# Patient Record
Sex: FEMALE | Race: WHITE | NOT HISPANIC OR LATINO | Employment: OTHER | URBAN - NONMETROPOLITAN AREA
[De-identification: names, ages, dates, MRNs, and addresses within clinical notes are randomized per-mention and may not be internally consistent; named-entity substitution may affect disease eponyms.]

---

## 2017-03-10 LAB
ALBUMIN SERPL-MCNC: 4 G/DL (ref 3.5–5.2)
ALP BLD-CCNC: 82 U/L (ref 35–104)
ALT SERPL-CCNC: 12 U/L (ref 5–33)
ANION GAP SERPL CALCULATED.3IONS-SCNC: 13 MMOL/L (ref 7–19)
AST SERPL-CCNC: 15 U/L (ref 5–32)
BASOPHILS ABSOLUTE: 0.1 K/UL (ref 0–0.2)
BASOPHILS RELATIVE PERCENT: 1.6 % (ref 0–1)
BILIRUB SERPL-MCNC: 0.3 MG/DL (ref 0.2–1.2)
BUN BLDV-MCNC: 16 MG/DL (ref 8–23)
CALCIUM SERPL-MCNC: 8.8 MG/DL (ref 8.8–10.2)
CHLORIDE BLD-SCNC: 106 MMOL/L (ref 98–111)
CO2: 26 MMOL/L (ref 22–29)
CREAT SERPL-MCNC: 0.9 MG/DL (ref 0.5–0.9)
EOSINOPHILS ABSOLUTE: 0.1 K/UL (ref 0–0.6)
EOSINOPHILS RELATIVE PERCENT: 2 % (ref 0–5)
GFR NON-AFRICAN AMERICAN: 59
GLOBULIN: 2.7 G/DL
GLUCOSE BLD-MCNC: 116 MG/DL (ref 74–109)
HCT VFR BLD CALC: 43.5 % (ref 37–47)
HEMOGLOBIN: 14.1 G/DL (ref 12–16)
LYMPHOCYTES ABSOLUTE: 1.6 K/UL (ref 1.1–4.5)
LYMPHOCYTES RELATIVE PERCENT: 23.6 % (ref 20–40)
MAGNESIUM: 2.3 MG/DL (ref 1.6–2.4)
MCH RBC QN AUTO: 31.7 PG (ref 27–31)
MCHC RBC AUTO-ENTMCNC: 32.4 G/DL (ref 33–37)
MCV RBC AUTO: 97.8 FL (ref 81–99)
MONOCYTES ABSOLUTE: 0.6 K/UL (ref 0–0.9)
MONOCYTES RELATIVE PERCENT: 8.8 % (ref 0–10)
NEUTROPHILS ABSOLUTE: 4.4 K/UL (ref 1.5–7.5)
NEUTROPHILS RELATIVE PERCENT: 64 % (ref 50–65)
PARATHYROID HORMONE INTACT: 110.6 PG/ML (ref 15–65)
PDW BLD-RTO: 13.7 % (ref 11.5–14.5)
PHOSPHORUS: 3.9 MG/DL (ref 2.5–4.5)
PLATELET # BLD: 230 K/UL (ref 130–400)
PMV BLD AUTO: 10.3 FL (ref 9.4–12.3)
POTASSIUM SERPL-SCNC: 3.8 MMOL/L (ref 3.5–5)
RBC # BLD: 4.45 M/UL (ref 4.2–5.4)
SODIUM BLD-SCNC: 145 MMOL/L (ref 136–145)
TOTAL PROTEIN: 6.7 G/DL (ref 6.6–8.7)
VITAMIN D 25-HYDROXY: 14.8 NG/ML
WBC # BLD: 6.9 K/UL (ref 4.8–10.8)

## 2017-04-12 ENCOUNTER — INFUSION (OUTPATIENT)
Dept: ONCOLOGY | Facility: HOSPITAL | Age: 82
End: 2017-04-12

## 2017-04-12 VITALS
SYSTOLIC BLOOD PRESSURE: 114 MMHG | TEMPERATURE: 97.6 F | HEART RATE: 93 BPM | OXYGEN SATURATION: 94 % | RESPIRATION RATE: 20 BRPM | DIASTOLIC BLOOD PRESSURE: 76 MMHG

## 2017-04-12 DIAGNOSIS — R69 DIAGNOSIS UNKNOWN: Primary | ICD-10-CM

## 2017-04-12 PROCEDURE — 25010000002 DENOSUMAB 60 MG/ML SOLUTION: Performed by: NURSE PRACTITIONER

## 2017-04-12 PROCEDURE — 96372 THER/PROPH/DIAG INJ SC/IM: CPT

## 2017-04-12 PROCEDURE — 96401 CHEMO ANTI-NEOPL SQ/IM: CPT

## 2017-04-12 RX ADMIN — DENOSUMAB 60 MG: 60 INJECTION SUBCUTANEOUS at 17:03

## 2017-08-12 ENCOUNTER — TELEPHONE (OUTPATIENT)
Dept: INTERNAL MEDICINE | Age: 82
End: 2017-08-12

## 2017-08-12 DIAGNOSIS — R53.83 FATIGUE, UNSPECIFIED TYPE: Primary | ICD-10-CM

## 2017-08-12 DIAGNOSIS — E78.01 FAMILIAL HYPERCHOLESTEROLEMIA: ICD-10-CM

## 2017-09-19 ENCOUNTER — HOSPITAL ENCOUNTER (OUTPATIENT)
Dept: GENERAL RADIOLOGY | Age: 82
Discharge: HOME | End: 2017-09-19
Payer: MEDICARE

## 2017-09-19 ENCOUNTER — OFFICE VISIT (OUTPATIENT)
Dept: INTERNAL MEDICINE | Age: 82
End: 2017-09-19
Payer: MEDICARE

## 2017-09-19 VITALS
SYSTOLIC BLOOD PRESSURE: 112 MMHG | DIASTOLIC BLOOD PRESSURE: 68 MMHG | HEART RATE: 68 BPM | HEIGHT: 66 IN | BODY MASS INDEX: 21.38 KG/M2 | OXYGEN SATURATION: 96 % | WEIGHT: 133 LBS

## 2017-09-19 DIAGNOSIS — R30.0 DYSURIA: ICD-10-CM

## 2017-09-19 DIAGNOSIS — M54.50 LOW BACK PAIN WITHOUT SCIATICA, UNSPECIFIED BACK PAIN LATERALITY, UNSPECIFIED CHRONICITY: ICD-10-CM

## 2017-09-19 DIAGNOSIS — G30.9 ALZHEIMER'S DEMENTIA WITHOUT BEHAVIORAL DISTURBANCE, UNSPECIFIED TIMING OF DEMENTIA ONSET: Primary | ICD-10-CM

## 2017-09-19 DIAGNOSIS — F32.A DEPRESSION, UNSPECIFIED DEPRESSION TYPE: ICD-10-CM

## 2017-09-19 DIAGNOSIS — F02.80 ALZHEIMER'S DEMENTIA WITHOUT BEHAVIORAL DISTURBANCE, UNSPECIFIED TIMING OF DEMENTIA ONSET: Primary | ICD-10-CM

## 2017-09-19 LAB
BACTERIA: ABNORMAL /HPF
BILIRUBIN URINE: ABNORMAL
BLOOD, URINE: ABNORMAL
CLARITY: ABNORMAL
COLOR: YELLOW
CRYSTALS, UA: ABNORMAL /HPF
EPITHELIAL CELLS, UA: ABNORMAL /HPF
GLUCOSE URINE: NEGATIVE MG/DL
KETONES, URINE: NEGATIVE MG/DL
LEUKOCYTE ESTERASE, URINE: ABNORMAL
NITRITE, URINE: NEGATIVE
PH UA: 6
PROTEIN UA: NEGATIVE MG/DL
SPECIFIC GRAVITY UA: 1.02
UROBILINOGEN, URINE: 0.2 E.U./DL
WBC UA: ABNORMAL /HPF (ref 0–5)

## 2017-09-19 PROCEDURE — 1090F PRES/ABSN URINE INCON ASSESS: CPT | Performed by: INTERNAL MEDICINE

## 2017-09-19 PROCEDURE — 99214 OFFICE O/P EST MOD 30 MIN: CPT | Performed by: INTERNAL MEDICINE

## 2017-09-19 PROCEDURE — 1123F ACP DISCUSS/DSCN MKR DOCD: CPT | Performed by: INTERNAL MEDICINE

## 2017-09-19 PROCEDURE — 4040F PNEUMOC VAC/ADMIN/RCVD: CPT | Performed by: INTERNAL MEDICINE

## 2017-09-19 PROCEDURE — G8400 PT W/DXA NO RESULTS DOC: HCPCS | Performed by: INTERNAL MEDICINE

## 2017-09-19 PROCEDURE — 1036F TOBACCO NON-USER: CPT | Performed by: INTERNAL MEDICINE

## 2017-09-19 PROCEDURE — G8420 CALC BMI NORM PARAMETERS: HCPCS | Performed by: INTERNAL MEDICINE

## 2017-09-19 PROCEDURE — G8427 DOCREV CUR MEDS BY ELIG CLIN: HCPCS | Performed by: INTERNAL MEDICINE

## 2017-09-19 PROCEDURE — 72100 X-RAY EXAM L-S SPINE 2/3 VWS: CPT

## 2017-09-19 ASSESSMENT — ENCOUNTER SYMPTOMS
SHORTNESS OF BREATH: 0
EYE DISCHARGE: 0
WHEEZING: 0
ABDOMINAL PAIN: 0
TROUBLE SWALLOWING: 0
BLOOD IN STOOL: 0
VOMITING: 0
NAUSEA: 0
ABDOMINAL DISTENTION: 0
SORE THROAT: 0
BACK PAIN: 1
EYE ITCHING: 0

## 2017-09-19 ASSESSMENT — PATIENT HEALTH QUESTIONNAIRE - PHQ9
SUM OF ALL RESPONSES TO PHQ QUESTIONS 1-9: 0
SUM OF ALL RESPONSES TO PHQ9 QUESTIONS 1 & 2: 0
1. LITTLE INTEREST OR PLEASURE IN DOING THINGS: 0
2. FEELING DOWN, DEPRESSED OR HOPELESS: 0

## 2017-09-20 ENCOUNTER — TELEPHONE (OUTPATIENT)
Dept: INTERNAL MEDICINE | Age: 82
End: 2017-09-20

## 2017-09-21 LAB
ORGANISM: ABNORMAL
URINE CULTURE, ROUTINE: ABNORMAL
URINE CULTURE, ROUTINE: ABNORMAL

## 2017-09-21 RX ORDER — NITROFURANTOIN 25; 75 MG/1; MG/1
100 CAPSULE ORAL 2 TIMES DAILY
Qty: 20 CAPSULE | Refills: 0 | Status: SHIPPED | OUTPATIENT
Start: 2017-09-21 | End: 2017-10-01

## 2017-09-22 ENCOUNTER — TELEPHONE (OUTPATIENT)
Dept: INTERNAL MEDICINE | Age: 82
End: 2017-09-22

## 2017-09-22 DIAGNOSIS — M54.50 LOW BACK PAIN WITHOUT SCIATICA, UNSPECIFIED BACK PAIN LATERALITY, UNSPECIFIED CHRONICITY: Primary | ICD-10-CM

## 2017-09-25 ENCOUNTER — TELEPHONE (OUTPATIENT)
Dept: INTERNAL MEDICINE | Age: 82
End: 2017-09-25

## 2017-09-25 RX ORDER — METHYLPREDNISOLONE 4 MG/1
TABLET ORAL
Qty: 1 KIT | Refills: 0 | Status: SHIPPED | OUTPATIENT
Start: 2017-09-25 | End: 2018-04-02 | Stop reason: ALTCHOICE

## 2017-10-11 DIAGNOSIS — R53.83 FATIGUE, UNSPECIFIED TYPE: ICD-10-CM

## 2017-10-11 DIAGNOSIS — E78.01 FAMILIAL HYPERCHOLESTEROLEMIA: ICD-10-CM

## 2017-10-11 LAB
CALCIUM SERPL-MCNC: 9.3 MG/DL (ref 8.8–10.2)
MAGNESIUM: 2.6 MG/DL (ref 1.6–2.4)
PHOSPHORUS: 3.2 MG/DL (ref 2.5–4.5)
VITAMIN D 25-HYDROXY: >60 NG/ML

## 2017-10-12 LAB
CHOLESTEROL, TOTAL: 247 MG/DL (ref 160–199)
HDLC SERPL-MCNC: 50 MG/DL (ref 65–121)
LDL CHOLESTEROL CALCULATED: 164 MG/DL
TRIGL SERPL-MCNC: 165 MG/DL (ref 0–149)
TSH SERPL DL<=0.05 MIU/L-ACNC: 1.8 UIU/ML (ref 0.27–4.2)

## 2017-10-18 ENCOUNTER — INFUSION (OUTPATIENT)
Dept: ONCOLOGY | Facility: HOSPITAL | Age: 82
End: 2017-10-18

## 2017-10-18 VITALS
SYSTOLIC BLOOD PRESSURE: 140 MMHG | HEIGHT: 66 IN | BODY MASS INDEX: 20.73 KG/M2 | RESPIRATION RATE: 16 BRPM | HEART RATE: 91 BPM | TEMPERATURE: 98.2 F | DIASTOLIC BLOOD PRESSURE: 87 MMHG | WEIGHT: 129 LBS | OXYGEN SATURATION: 95 %

## 2017-10-18 DIAGNOSIS — R69 DIAGNOSIS UNKNOWN: Primary | ICD-10-CM

## 2017-10-18 PROCEDURE — 25010000002 DENOSUMAB 60 MG/ML SOLUTION: Performed by: NURSE PRACTITIONER

## 2017-10-18 PROCEDURE — 96372 THER/PROPH/DIAG INJ SC/IM: CPT

## 2017-10-18 RX ADMIN — DENOSUMAB 60 MG: 60 INJECTION SUBCUTANEOUS at 14:31

## 2018-01-29 ENCOUNTER — TELEPHONE (OUTPATIENT)
Dept: INTERNAL MEDICINE | Age: 83
End: 2018-01-29

## 2018-01-29 DIAGNOSIS — R30.0 DYSURIA: Primary | ICD-10-CM

## 2018-01-29 RX ORDER — NITROFURANTOIN 25; 75 MG/1; MG/1
100 CAPSULE ORAL 2 TIMES DAILY
Qty: 20 CAPSULE | Refills: 0 | Status: SHIPPED | OUTPATIENT
Start: 2018-01-29 | End: 2018-02-08

## 2018-02-19 DIAGNOSIS — R30.0 DYSURIA: Primary | ICD-10-CM

## 2018-02-19 DIAGNOSIS — R30.0 DYSURIA: ICD-10-CM

## 2018-02-21 LAB
ORGANISM: ABNORMAL
URINE CULTURE, ROUTINE: ABNORMAL
URINE CULTURE, ROUTINE: ABNORMAL

## 2018-02-22 RX ORDER — SULFAMETHOXAZOLE AND TRIMETHOPRIM 800; 160 MG/1; MG/1
1 TABLET ORAL 2 TIMES DAILY
Qty: 20 TABLET | Refills: 0 | Status: SHIPPED | OUTPATIENT
Start: 2018-02-22 | End: 2018-03-04

## 2018-03-26 DIAGNOSIS — Z00.00 PE (PHYSICAL EXAM), ROUTINE: Primary | ICD-10-CM

## 2018-03-27 DIAGNOSIS — Z00.00 PE (PHYSICAL EXAM), ROUTINE: ICD-10-CM

## 2018-03-27 LAB
ALBUMIN SERPL-MCNC: 4.2 G/DL (ref 3.5–5.2)
ALP BLD-CCNC: 50 U/L (ref 35–104)
ALT SERPL-CCNC: 9 U/L (ref 5–33)
ANION GAP SERPL CALCULATED.3IONS-SCNC: 11 MMOL/L (ref 7–19)
AST SERPL-CCNC: 15 U/L (ref 5–32)
BACTERIA: NEGATIVE /HPF
BASOPHILS ABSOLUTE: 0.1 K/UL (ref 0–0.2)
BASOPHILS RELATIVE PERCENT: 2 % (ref 0–1)
BILIRUB SERPL-MCNC: 0.4 MG/DL (ref 0.2–1.2)
BILIRUBIN URINE: NEGATIVE
BLOOD, URINE: NEGATIVE
BUN BLDV-MCNC: 11 MG/DL (ref 8–23)
CALCIUM SERPL-MCNC: 9.2 MG/DL (ref 8.8–10.2)
CHLORIDE BLD-SCNC: 102 MMOL/L (ref 98–111)
CHOLESTEROL, TOTAL: 241 MG/DL (ref 160–199)
CLARITY: CLEAR
CO2: 28 MMOL/L (ref 22–29)
COLOR: YELLOW
CREAT SERPL-MCNC: 1 MG/DL (ref 0.5–0.9)
EOSINOPHILS ABSOLUTE: 0.1 K/UL (ref 0–0.6)
EOSINOPHILS RELATIVE PERCENT: 1.7 % (ref 0–5)
EPITHELIAL CELLS, UA: 5 /HPF (ref 0–5)
GFR NON-AFRICAN AMERICAN: 53
GLUCOSE BLD-MCNC: 97 MG/DL (ref 74–109)
GLUCOSE URINE: NEGATIVE MG/DL
HBA1C MFR BLD: 5.6 %
HCT VFR BLD CALC: 44.4 % (ref 37–47)
HDLC SERPL-MCNC: 61 MG/DL (ref 65–121)
HEMOGLOBIN: 14 G/DL (ref 12–16)
HYALINE CASTS: 1 /HPF (ref 0–8)
KETONES, URINE: NEGATIVE MG/DL
LDL CHOLESTEROL CALCULATED: 143 MG/DL
LEUKOCYTE ESTERASE, URINE: ABNORMAL
LYMPHOCYTES ABSOLUTE: 1.8 K/UL (ref 1.1–4.5)
LYMPHOCYTES RELATIVE PERCENT: 26.9 % (ref 20–40)
MCH RBC QN AUTO: 31.5 PG (ref 27–31)
MCHC RBC AUTO-ENTMCNC: 31.5 G/DL (ref 33–37)
MCV RBC AUTO: 100 FL (ref 81–99)
MONOCYTES ABSOLUTE: 0.7 K/UL (ref 0–0.9)
MONOCYTES RELATIVE PERCENT: 10.3 % (ref 0–10)
NEUTROPHILS ABSOLUTE: 3.8 K/UL (ref 1.5–7.5)
NEUTROPHILS RELATIVE PERCENT: 58.6 % (ref 50–65)
NITRITE, URINE: NEGATIVE
PDW BLD-RTO: 13.9 % (ref 11.5–14.5)
PH UA: 7.5
PLATELET # BLD: 244 K/UL (ref 130–400)
PMV BLD AUTO: 10.2 FL (ref 9.4–12.3)
POTASSIUM SERPL-SCNC: 4.5 MMOL/L (ref 3.5–5)
PROTEIN UA: NEGATIVE MG/DL
RBC # BLD: 4.44 M/UL (ref 4.2–5.4)
RBC UA: 1 /HPF (ref 0–4)
SODIUM BLD-SCNC: 141 MMOL/L (ref 136–145)
SPECIFIC GRAVITY UA: 1.01
TOTAL PROTEIN: 7.5 G/DL (ref 6.6–8.7)
TRIGL SERPL-MCNC: 183 MG/DL (ref 0–149)
UROBILINOGEN, URINE: 0.2 E.U./DL
WBC # BLD: 6.5 K/UL (ref 4.8–10.8)
WBC UA: 2 /HPF (ref 0–5)

## 2018-04-02 ENCOUNTER — OFFICE VISIT (OUTPATIENT)
Dept: INTERNAL MEDICINE | Age: 83
End: 2018-04-02
Payer: MEDICARE

## 2018-04-02 VITALS
HEART RATE: 85 BPM | DIASTOLIC BLOOD PRESSURE: 80 MMHG | RESPIRATION RATE: 18 BRPM | SYSTOLIC BLOOD PRESSURE: 130 MMHG | WEIGHT: 126 LBS | OXYGEN SATURATION: 97 % | BODY MASS INDEX: 20.34 KG/M2

## 2018-04-02 DIAGNOSIS — F02.80 ALZHEIMER'S DEMENTIA WITHOUT BEHAVIORAL DISTURBANCE, UNSPECIFIED TIMING OF DEMENTIA ONSET: ICD-10-CM

## 2018-04-02 DIAGNOSIS — G30.9 ALZHEIMER'S DEMENTIA WITHOUT BEHAVIORAL DISTURBANCE, UNSPECIFIED TIMING OF DEMENTIA ONSET: ICD-10-CM

## 2018-04-02 DIAGNOSIS — M54.50 LOW BACK PAIN WITHOUT SCIATICA, UNSPECIFIED BACK PAIN LATERALITY, UNSPECIFIED CHRONICITY: ICD-10-CM

## 2018-04-02 DIAGNOSIS — F32.A DEPRESSION, UNSPECIFIED DEPRESSION TYPE: Primary | ICD-10-CM

## 2018-04-02 PROCEDURE — 4040F PNEUMOC VAC/ADMIN/RCVD: CPT | Performed by: INTERNAL MEDICINE

## 2018-04-02 PROCEDURE — 1123F ACP DISCUSS/DSCN MKR DOCD: CPT | Performed by: INTERNAL MEDICINE

## 2018-04-02 PROCEDURE — 1036F TOBACCO NON-USER: CPT | Performed by: INTERNAL MEDICINE

## 2018-04-02 PROCEDURE — 1090F PRES/ABSN URINE INCON ASSESS: CPT | Performed by: INTERNAL MEDICINE

## 2018-04-02 PROCEDURE — G8400 PT W/DXA NO RESULTS DOC: HCPCS | Performed by: INTERNAL MEDICINE

## 2018-04-02 PROCEDURE — G8427 DOCREV CUR MEDS BY ELIG CLIN: HCPCS | Performed by: INTERNAL MEDICINE

## 2018-04-02 PROCEDURE — G8420 CALC BMI NORM PARAMETERS: HCPCS | Performed by: INTERNAL MEDICINE

## 2018-04-02 PROCEDURE — 99214 OFFICE O/P EST MOD 30 MIN: CPT | Performed by: INTERNAL MEDICINE

## 2018-04-16 LAB
CALCIUM SERPL-MCNC: 9.4 MG/DL (ref 8.8–10.2)
VITAMIN D 25-HYDROXY: >60 NG/ML

## 2018-04-25 ENCOUNTER — INFUSION (OUTPATIENT)
Dept: ONCOLOGY | Facility: HOSPITAL | Age: 83
End: 2018-04-25

## 2018-04-25 VITALS
WEIGHT: 129 LBS | HEIGHT: 66 IN | HEART RATE: 88 BPM | TEMPERATURE: 98.1 F | BODY MASS INDEX: 20.73 KG/M2 | RESPIRATION RATE: 20 BRPM | OXYGEN SATURATION: 93 % | DIASTOLIC BLOOD PRESSURE: 78 MMHG | SYSTOLIC BLOOD PRESSURE: 145 MMHG

## 2018-04-25 DIAGNOSIS — M81.0 SENILE OSTEOPOROSIS: Primary | ICD-10-CM

## 2018-04-25 PROCEDURE — 25010000002 DENOSUMAB 60 MG/ML SOLUTION: Performed by: NURSE PRACTITIONER

## 2018-04-25 PROCEDURE — 96372 THER/PROPH/DIAG INJ SC/IM: CPT

## 2018-04-25 RX ADMIN — DENOSUMAB 60 MG: 60 INJECTION SUBCUTANEOUS at 15:43

## 2018-04-25 NOTE — PATIENT INSTRUCTIONS
Denosumab injection  What is this medicine?  DENOSUMAB (den oh césar mab) slows bone breakdown. Prolia is used to treat osteoporosis in women after menopause and in men. Xgeva is used to treat a high calcium level due to cancer and to prevent bone fractures and other bone problems caused by multiple myeloma or cancer bone metastases. Xgeva is also used to treat giant cell tumor of the bone.  This medicine may be used for other purposes; ask your health care provider or pharmacist if you have questions.  COMMON BRAND NAME(S): Prolia, XGEVA  What should I tell my health care provider before I take this medicine?  They need to know if you have any of these conditions:  -dental disease  -having surgery or tooth extraction  -infection  -kidney disease  -low levels of calcium or Vitamin D in the blood  -malnutrition  -on hemodialysis  -skin conditions or sensitivity  -thyroid or parathyroid disease  -an unusual reaction to denosumab, other medicines, foods, dyes, or preservatives  -pregnant or trying to get pregnant  -breast-feeding  How should I use this medicine?  This medicine is for injection under the skin. It is given by a health care professional in a hospital or clinic setting.  If you are getting Prolia, a special MedGuide will be given to you by the pharmacist with each prescription and refill. Be sure to read this information carefully each time.  For Prolia, talk to your pediatrician regarding the use of this medicine in children. Special care may be needed. For Xgeva, talk to your pediatrician regarding the use of this medicine in children. While this drug may be prescribed for children as young as 13 years for selected conditions, precautions do apply.  Overdosage: If you think you have taken too much of this medicine contact a poison control center or emergency room at once.  NOTE: This medicine is only for you. Do not share this medicine with others.  What if I miss a dose?  It is important not to miss your  dose. Call your doctor or health care professional if you are unable to keep an appointment.  What may interact with this medicine?  Do not take this medicine with any of the following medications:  -other medicines containing denosumab  This medicine may also interact with the following medications:  -medicines that lower your chance of fighting infection  -steroid medicines like prednisone or cortisone  This list may not describe all possible interactions. Give your health care provider a list of all the medicines, herbs, non-prescription drugs, or dietary supplements you use. Also tell them if you smoke, drink alcohol, or use illegal drugs. Some items may interact with your medicine.  What should I watch for while using this medicine?  Visit your doctor or health care professional for regular checks on your progress. Your doctor or health care professional may order blood tests and other tests to see how you are doing.  Call your doctor or health care professional for advice if you get a fever, chills or sore throat, or other symptoms of a cold or flu. Do not treat yourself. This drug may decrease your body's ability to fight infection. Try to avoid being around people who are sick.  You should make sure you get enough calcium and vitamin D while you are taking this medicine, unless your doctor tells you not to. Discuss the foods you eat and the vitamins you take with your health care professional.  See your dentist regularly. Brush and floss your teeth as directed. Before you have any dental work done, tell your dentist you are receiving this medicine.  Do not become pregnant while taking this medicine or for 5 months after stopping it. Talk with your doctor or health care professional about your birth control options while taking this medicine. Women should inform their doctor if they wish to become pregnant or think they might be pregnant. There is a potential for serious side effects to an unborn child. Talk  to your health care professional or pharmacist for more information.  What side effects may I notice from receiving this medicine?  Side effects that you should report to your doctor or health care professional as soon as possible:  -allergic reactions like skin rash, itching or hives, swelling of the face, lips, or tongue  -bone pain  -breathing problems  -dizziness  -jaw pain, especially after dental work  -redness, blistering, peeling of the skin  -signs and symptoms of infection like fever or chills; cough; sore throat; pain or trouble passing urine  -signs of low calcium like fast heartbeat, muscle cramps or muscle pain; pain, tingling, numbness in the hands or feet; seizures  -unusual bleeding or bruising  -unusually weak or tired  Side effects that usually do not require medical attention (report to your doctor or health care professional if they continue or are bothersome):  -constipation  -diarrhea  -headache  -joint pain  -loss of appetite  -muscle pain  -runny nose  -tiredness  -upset stomach  This list may not describe all possible side effects. Call your doctor for medical advice about side effects. You may report side effects to FDA at 4-691-FDA-2548.  Where should I keep my medicine?  This medicine is only given in a clinic, doctor's office, or other health care setting and will not be stored at home.  NOTE: This sheet is a summary. It may not cover all possible information. If you have questions about this medicine, talk to your doctor, pharmacist, or health care provider.  © 2018 Elsevier/Gold Standard (2018-01-09 19:17:21)

## 2018-10-18 ENCOUNTER — OFFICE VISIT (OUTPATIENT)
Dept: INTERNAL MEDICINE | Age: 83
End: 2018-10-18
Payer: MEDICARE

## 2018-10-18 VITALS
HEIGHT: 66 IN | HEART RATE: 90 BPM | BODY MASS INDEX: 20.09 KG/M2 | SYSTOLIC BLOOD PRESSURE: 122 MMHG | DIASTOLIC BLOOD PRESSURE: 82 MMHG | OXYGEN SATURATION: 98 % | WEIGHT: 125 LBS

## 2018-10-18 DIAGNOSIS — M54.50 LOW BACK PAIN WITHOUT SCIATICA, UNSPECIFIED BACK PAIN LATERALITY, UNSPECIFIED CHRONICITY: ICD-10-CM

## 2018-10-18 DIAGNOSIS — E78.00 HYPERCHOLESTEREMIA: ICD-10-CM

## 2018-10-18 DIAGNOSIS — F32.A DEPRESSION, UNSPECIFIED DEPRESSION TYPE: Primary | ICD-10-CM

## 2018-10-18 DIAGNOSIS — N18.30 CHRONIC KIDNEY DISEASE, STAGE III (MODERATE) (HCC): ICD-10-CM

## 2018-10-18 DIAGNOSIS — F03.90 DEMENTIA WITHOUT BEHAVIORAL DISTURBANCE, UNSPECIFIED DEMENTIA TYPE: ICD-10-CM

## 2018-10-18 LAB
CALCIUM SERPL-MCNC: 9.8 MG/DL (ref 8.8–10.2)
MAGNESIUM: 2.5 MG/DL (ref 1.6–2.4)
PHOSPHORUS: 3.7 MG/DL (ref 2.5–4.5)
VITAMIN D 25-HYDROXY: >99 NG/ML

## 2018-10-18 PROCEDURE — G8484 FLU IMMUNIZE NO ADMIN: HCPCS | Performed by: INTERNAL MEDICINE

## 2018-10-18 PROCEDURE — 99214 OFFICE O/P EST MOD 30 MIN: CPT | Performed by: INTERNAL MEDICINE

## 2018-10-18 PROCEDURE — 1101F PT FALLS ASSESS-DOCD LE1/YR: CPT | Performed by: INTERNAL MEDICINE

## 2018-10-18 PROCEDURE — 1036F TOBACCO NON-USER: CPT | Performed by: INTERNAL MEDICINE

## 2018-10-18 PROCEDURE — 4040F PNEUMOC VAC/ADMIN/RCVD: CPT | Performed by: INTERNAL MEDICINE

## 2018-10-18 PROCEDURE — 1090F PRES/ABSN URINE INCON ASSESS: CPT | Performed by: INTERNAL MEDICINE

## 2018-10-18 PROCEDURE — G8427 DOCREV CUR MEDS BY ELIG CLIN: HCPCS | Performed by: INTERNAL MEDICINE

## 2018-10-18 PROCEDURE — G8420 CALC BMI NORM PARAMETERS: HCPCS | Performed by: INTERNAL MEDICINE

## 2018-10-18 PROCEDURE — 1123F ACP DISCUSS/DSCN MKR DOCD: CPT | Performed by: INTERNAL MEDICINE

## 2018-10-18 ASSESSMENT — ENCOUNTER SYMPTOMS
SINUS PRESSURE: 0
ABDOMINAL DISTENTION: 0
TROUBLE SWALLOWING: 0
EYE ITCHING: 0
ABDOMINAL PAIN: 0
BACK PAIN: 0
WHEEZING: 0
SHORTNESS OF BREATH: 0
EYE DISCHARGE: 0
VOMITING: 0
BLOOD IN STOOL: 0
NAUSEA: 0
SORE THROAT: 0

## 2018-10-18 ASSESSMENT — PATIENT HEALTH QUESTIONNAIRE - PHQ9
1. LITTLE INTEREST OR PLEASURE IN DOING THINGS: 0
2. FEELING DOWN, DEPRESSED OR HOPELESS: 0
SUM OF ALL RESPONSES TO PHQ QUESTIONS 1-9: 0
SUM OF ALL RESPONSES TO PHQ QUESTIONS 1-9: 0
SUM OF ALL RESPONSES TO PHQ9 QUESTIONS 1 & 2: 0

## 2018-10-18 NOTE — PROGRESS NOTES
Genitourinary: Negative for flank pain, frequency, hematuria and urgency. Musculoskeletal: Negative for arthralgias, back pain and joint swelling. Skin: Negative for rash and wound. Allergic/Immunologic: Negative for environmental allergies and food allergies. Neurological: Negative for dizziness, tremors, syncope, weakness, numbness and headaches. Hematological: Negative for adenopathy. Psychiatric/Behavioral: Positive for behavioral problems and sleep disturbance. Negative for agitation and hallucinations. The patient is nervous/anxious. Objective:     /82   Pulse 90   Ht 5' 6\" (1.676 m)   Wt 125 lb (56.7 kg)   SpO2 98%   BMI 20.18 kg/m²   Physical Exam   Constitutional: She is oriented to person, place, and time. She appears well-developed and well-nourished. No distress. HENT:   Head: Normocephalic and atraumatic. Right Ear: External ear normal.   Left Ear: External ear normal.   Nose: Nose normal.   Mouth/Throat: Oropharynx is clear and moist. No oropharyngeal exudate. Eyes: Pupils are equal, round, and reactive to light. Conjunctivae and EOM are normal. Right eye exhibits no discharge. Left eye exhibits no discharge. No scleral icterus. Neck: Normal range of motion. Neck supple. No JVD present. No tracheal deviation present. No thyromegaly present. Cardiovascular: Normal rate, regular rhythm, normal heart sounds and intact distal pulses. Exam reveals no gallop and no friction rub. No murmur heard. Pulmonary/Chest: Effort normal and breath sounds normal. No respiratory distress. She has no wheezes. She has no rales. Abdominal: Soft. Bowel sounds are normal. She exhibits no distension and no mass. There is no tenderness. There is no rebound and no guarding. Musculoskeletal: Normal range of motion. She exhibits no edema, tenderness or deformity. Lymphadenopathy:     She has no cervical adenopathy.    Neurological: She is alert and oriented to person, place, and

## 2018-10-22 DIAGNOSIS — M81.0 OSTEOPOROSIS, UNSPECIFIED OSTEOPOROSIS TYPE, UNSPECIFIED PATHOLOGICAL FRACTURE PRESENCE: ICD-10-CM

## 2018-10-31 ENCOUNTER — HOSPITAL ENCOUNTER (OUTPATIENT)
Dept: INFUSION THERAPY | Age: 83
Setting detail: INFUSION SERIES
Discharge: HOME | End: 2018-10-31
Payer: MEDICARE

## 2018-10-31 VITALS
TEMPERATURE: 98.4 F | RESPIRATION RATE: 17 BRPM | SYSTOLIC BLOOD PRESSURE: 150 MMHG | DIASTOLIC BLOOD PRESSURE: 85 MMHG | HEART RATE: 86 BPM

## 2018-10-31 DIAGNOSIS — M81.0 OSTEOPOROSIS, UNSPECIFIED OSTEOPOROSIS TYPE, UNSPECIFIED PATHOLOGICAL FRACTURE PRESENCE: ICD-10-CM

## 2018-10-31 PROCEDURE — 6360000002 HC RX W HCPCS: Performed by: NURSE PRACTITIONER

## 2018-10-31 PROCEDURE — 96372 THER/PROPH/DIAG INJ SC/IM: CPT

## 2018-10-31 RX ADMIN — DENOSUMAB 60 MG: 60 INJECTION SUBCUTANEOUS at 15:06

## 2019-01-04 ENCOUNTER — OFFICE VISIT (OUTPATIENT)
Dept: INTERNAL MEDICINE | Age: 84
End: 2019-01-04
Payer: MEDICARE

## 2019-01-04 VITALS
DIASTOLIC BLOOD PRESSURE: 78 MMHG | TEMPERATURE: 97.9 F | HEIGHT: 66 IN | HEART RATE: 77 BPM | SYSTOLIC BLOOD PRESSURE: 130 MMHG | OXYGEN SATURATION: 96 % | WEIGHT: 125 LBS | BODY MASS INDEX: 20.09 KG/M2

## 2019-01-04 DIAGNOSIS — R30.0 DYSURIA: Primary | ICD-10-CM

## 2019-01-04 LAB
APPEARANCE FLUID: ABNORMAL
BILIRUBIN, POC: ABNORMAL
BLOOD URINE, POC: ABNORMAL
CLARITY, POC: ABNORMAL
COLOR, POC: YELLOW
GLUCOSE URINE, POC: ABNORMAL
KETONES, POC: ABNORMAL
LEUKOCYTE EST, POC: ABNORMAL
NITRITE, POC: POSITIVE
PH, POC: 6
PROTEIN, POC: ABNORMAL
SPECIFIC GRAVITY, POC: 1.02
UROBILINOGEN, POC: 0.2

## 2019-01-04 PROCEDURE — 1101F PT FALLS ASSESS-DOCD LE1/YR: CPT | Performed by: INTERNAL MEDICINE

## 2019-01-04 PROCEDURE — 1036F TOBACCO NON-USER: CPT | Performed by: INTERNAL MEDICINE

## 2019-01-04 PROCEDURE — 99213 OFFICE O/P EST LOW 20 MIN: CPT | Performed by: INTERNAL MEDICINE

## 2019-01-04 PROCEDURE — 1123F ACP DISCUSS/DSCN MKR DOCD: CPT | Performed by: INTERNAL MEDICINE

## 2019-01-04 PROCEDURE — 81002 URINALYSIS NONAUTO W/O SCOPE: CPT | Performed by: INTERNAL MEDICINE

## 2019-01-04 PROCEDURE — G8427 DOCREV CUR MEDS BY ELIG CLIN: HCPCS | Performed by: INTERNAL MEDICINE

## 2019-01-04 PROCEDURE — 1090F PRES/ABSN URINE INCON ASSESS: CPT | Performed by: INTERNAL MEDICINE

## 2019-01-04 PROCEDURE — G8484 FLU IMMUNIZE NO ADMIN: HCPCS | Performed by: INTERNAL MEDICINE

## 2019-01-04 PROCEDURE — G8420 CALC BMI NORM PARAMETERS: HCPCS | Performed by: INTERNAL MEDICINE

## 2019-01-04 PROCEDURE — 4040F PNEUMOC VAC/ADMIN/RCVD: CPT | Performed by: INTERNAL MEDICINE

## 2019-01-04 RX ORDER — PHENAZOPYRIDINE HYDROCHLORIDE 200 MG/1
200 TABLET, FILM COATED ORAL 3 TIMES DAILY PRN
Qty: 30 TABLET | Refills: 0 | Status: SHIPPED | OUTPATIENT
Start: 2019-01-04 | End: 2019-01-07

## 2019-01-04 RX ORDER — NITROFURANTOIN 25; 75 MG/1; MG/1
100 CAPSULE ORAL 2 TIMES DAILY
Qty: 20 CAPSULE | Refills: 0 | Status: SHIPPED | OUTPATIENT
Start: 2019-01-04 | End: 2019-01-18 | Stop reason: SDUPTHER

## 2019-01-16 ENCOUNTER — TELEPHONE (OUTPATIENT)
Dept: INTERNAL MEDICINE | Age: 84
End: 2019-01-16

## 2019-01-17 DIAGNOSIS — R30.0 DYSURIA: Primary | ICD-10-CM

## 2019-01-18 ENCOUNTER — TELEPHONE (OUTPATIENT)
Dept: INTERNAL MEDICINE | Age: 84
End: 2019-01-18

## 2019-01-18 DIAGNOSIS — R30.0 DYSURIA: ICD-10-CM

## 2019-01-18 DIAGNOSIS — R30.0 DYSURIA: Primary | ICD-10-CM

## 2019-01-18 LAB
BACTERIA: ABNORMAL /HPF
BILIRUBIN URINE: NEGATIVE
BLOOD, URINE: NEGATIVE
CLARITY: ABNORMAL
COLOR: YELLOW
EPITHELIAL CELLS, UA: 1 /HPF (ref 0–5)
GLUCOSE URINE: NEGATIVE MG/DL
HYALINE CASTS: 3 /HPF (ref 0–8)
KETONES, URINE: NEGATIVE MG/DL
LEUKOCYTE ESTERASE, URINE: ABNORMAL
NITRITE, URINE: NEGATIVE
PH UA: 6
PROTEIN UA: NEGATIVE MG/DL
RBC UA: 1 /HPF (ref 0–4)
SPECIFIC GRAVITY UA: 1.01
URINE REFLEX TO CULTURE: YES
UROBILINOGEN, URINE: 0.2 E.U./DL
WBC UA: 90 /HPF (ref 0–5)

## 2019-01-18 RX ORDER — NITROFURANTOIN 25; 75 MG/1; MG/1
100 CAPSULE ORAL 2 TIMES DAILY
Qty: 20 CAPSULE | Refills: 0 | Status: SHIPPED | OUTPATIENT
Start: 2019-01-18 | End: 2019-01-28

## 2019-01-20 LAB
ORGANISM: ABNORMAL
URINE CULTURE, ROUTINE: ABNORMAL
URINE CULTURE, ROUTINE: ABNORMAL

## 2019-01-29 ENCOUNTER — TELEPHONE (OUTPATIENT)
Dept: INTERNAL MEDICINE | Age: 84
End: 2019-01-29

## 2019-01-29 DIAGNOSIS — R30.0 DYSURIA: Primary | ICD-10-CM

## 2019-01-31 DIAGNOSIS — R30.0 DYSURIA: ICD-10-CM

## 2019-01-31 LAB
BACTERIA: ABNORMAL /HPF
BILIRUBIN URINE: NEGATIVE
BLOOD, URINE: NEGATIVE
CLARITY: ABNORMAL
COLOR: YELLOW
CRYSTALS, UA: ABNORMAL /HPF
EPITHELIAL CELLS, UA: ABNORMAL /HPF
GLUCOSE URINE: NEGATIVE MG/DL
KETONES, URINE: NEGATIVE MG/DL
LEUKOCYTE ESTERASE, URINE: ABNORMAL
NITRITE, URINE: NEGATIVE
PH UA: 6
PROTEIN UA: NEGATIVE MG/DL
RBC UA: ABNORMAL /HPF (ref 0–2)
SPECIFIC GRAVITY UA: 1.02
UROBILINOGEN, URINE: 0.2 E.U./DL
WBC UA: ABNORMAL /HPF (ref 0–5)

## 2019-01-31 RX ORDER — NITROFURANTOIN 25; 75 MG/1; MG/1
CAPSULE ORAL
Qty: 90 CAPSULE | Refills: 0 | Status: SHIPPED | OUTPATIENT
Start: 2019-01-31 | End: 2019-05-13

## 2019-04-24 DIAGNOSIS — F32.A DEPRESSION, UNSPECIFIED DEPRESSION TYPE: ICD-10-CM

## 2019-04-24 DIAGNOSIS — E78.00 HYPERCHOLESTEREMIA: ICD-10-CM

## 2019-04-24 DIAGNOSIS — M54.50 LOW BACK PAIN WITHOUT SCIATICA, UNSPECIFIED BACK PAIN LATERALITY, UNSPECIFIED CHRONICITY: ICD-10-CM

## 2019-04-24 DIAGNOSIS — F03.90 DEMENTIA WITHOUT BEHAVIORAL DISTURBANCE, UNSPECIFIED DEMENTIA TYPE: ICD-10-CM

## 2019-04-24 LAB
ALBUMIN SERPL-MCNC: 4 G/DL (ref 3.5–5.2)
ALP BLD-CCNC: 47 U/L (ref 35–104)
ALT SERPL-CCNC: 9 U/L (ref 5–33)
ANION GAP SERPL CALCULATED.3IONS-SCNC: 12 MMOL/L (ref 7–19)
AST SERPL-CCNC: 14 U/L (ref 5–32)
BASOPHILS ABSOLUTE: 0.1 K/UL (ref 0–0.2)
BASOPHILS RELATIVE PERCENT: 1.3 % (ref 0–1)
BILIRUB SERPL-MCNC: 0.3 MG/DL (ref 0.2–1.2)
BUN BLDV-MCNC: 17 MG/DL (ref 8–23)
CALCIUM SERPL-MCNC: 9.5 MG/DL (ref 8.8–10.2)
CHLORIDE BLD-SCNC: 105 MMOL/L (ref 98–111)
CHOLESTEROL, TOTAL: 248 MG/DL (ref 160–199)
CO2: 26 MMOL/L (ref 22–29)
CREAT SERPL-MCNC: 0.7 MG/DL (ref 0.5–0.9)
EOSINOPHILS ABSOLUTE: 0.1 K/UL (ref 0–0.6)
EOSINOPHILS RELATIVE PERCENT: 1.5 % (ref 0–5)
GFR NON-AFRICAN AMERICAN: >60
GLUCOSE BLD-MCNC: 139 MG/DL (ref 74–109)
HCT VFR BLD CALC: 43 % (ref 37–47)
HDLC SERPL-MCNC: 56 MG/DL (ref 65–121)
HEMOGLOBIN: 13.5 G/DL (ref 12–16)
LDL CHOLESTEROL CALCULATED: 152 MG/DL
LYMPHOCYTES ABSOLUTE: 1.3 K/UL (ref 1.1–4.5)
LYMPHOCYTES RELATIVE PERCENT: 18.6 % (ref 20–40)
MCH RBC QN AUTO: 32.4 PG (ref 27–31)
MCHC RBC AUTO-ENTMCNC: 31.4 G/DL (ref 33–37)
MCV RBC AUTO: 103.1 FL (ref 81–99)
MONOCYTES ABSOLUTE: 0.5 K/UL (ref 0–0.9)
MONOCYTES RELATIVE PERCENT: 7.7 % (ref 0–10)
NEUTROPHILS ABSOLUTE: 4.7 K/UL (ref 1.5–7.5)
NEUTROPHILS RELATIVE PERCENT: 70.5 % (ref 50–65)
PDW BLD-RTO: 13.5 % (ref 11.5–14.5)
PLATELET # BLD: 114 K/UL (ref 130–400)
PMV BLD AUTO: 11.6 FL (ref 9.4–12.3)
POTASSIUM SERPL-SCNC: 4.1 MMOL/L (ref 3.5–5)
RBC # BLD: 4.17 M/UL (ref 4.2–5.4)
SODIUM BLD-SCNC: 143 MMOL/L (ref 136–145)
TOTAL PROTEIN: 7.2 G/DL (ref 6.6–8.7)
TRIGL SERPL-MCNC: 201 MG/DL (ref 0–149)
TSH SERPL DL<=0.05 MIU/L-ACNC: 1.95 UIU/ML (ref 0.27–4.2)
VITAMIN D 25-HYDROXY: 97.5 NG/ML
WBC # BLD: 6.7 K/UL (ref 4.8–10.8)

## 2019-05-08 ENCOUNTER — TELEPHONE (OUTPATIENT)
Dept: INTERNAL MEDICINE | Age: 84
End: 2019-05-08

## 2019-05-09 DIAGNOSIS — R30.0 DYSURIA: ICD-10-CM

## 2019-05-09 DIAGNOSIS — R30.0 DYSURIA: Primary | ICD-10-CM

## 2019-05-09 LAB
BACTERIA: ABNORMAL /HPF
BILIRUBIN URINE: NEGATIVE
BLOOD, URINE: NEGATIVE
CLARITY: ABNORMAL
COLOR: YELLOW
EPITHELIAL CELLS, UA: 3 /HPF (ref 0–5)
GLUCOSE URINE: NEGATIVE MG/DL
HYALINE CASTS: 9 /HPF (ref 0–8)
KETONES, URINE: NEGATIVE MG/DL
LEUKOCYTE ESTERASE, URINE: ABNORMAL
NITRITE, URINE: POSITIVE
PH UA: 6 (ref 5–8)
PROTEIN UA: NEGATIVE MG/DL
RBC UA: 1 /HPF (ref 0–4)
SPECIFIC GRAVITY UA: 1.02 (ref 1–1.03)
URINE REFLEX TO CULTURE: YES
UROBILINOGEN, URINE: 0.2 E.U./DL
WBC UA: 29 /HPF (ref 0–5)

## 2019-05-11 LAB
ORGANISM: ABNORMAL
URINE CULTURE, ROUTINE: ABNORMAL
URINE CULTURE, ROUTINE: ABNORMAL

## 2019-05-13 RX ORDER — SULFAMETHOXAZOLE AND TRIMETHOPRIM 800; 160 MG/1; MG/1
1 TABLET ORAL 2 TIMES DAILY
Qty: 28 TABLET | Refills: 0 | Status: SHIPPED | OUTPATIENT
Start: 2019-05-13 | End: 2019-09-03 | Stop reason: SDUPTHER

## 2019-05-13 NOTE — TELEPHONE ENCOUNTER
Spoke with daughter, they would like to know the results of pt's UA and if she can get a prolia shot.

## 2019-05-13 NOTE — TELEPHONE ENCOUNTER
She still has an infection.  Switch her to Bactrim DS 1 by mouth twice a day for 2 weeks send that to the pharmacy

## 2019-05-15 ENCOUNTER — HOSPITAL ENCOUNTER (OUTPATIENT)
Dept: INFUSION THERAPY | Age: 84
Setting detail: INFUSION SERIES
Discharge: HOME | End: 2019-05-15
Payer: MEDICARE

## 2019-05-15 VITALS — SYSTOLIC BLOOD PRESSURE: 120 MMHG | HEART RATE: 81 BPM | DIASTOLIC BLOOD PRESSURE: 67 MMHG | RESPIRATION RATE: 17 BRPM

## 2019-05-15 DIAGNOSIS — M81.0 OSTEOPOROSIS, UNSPECIFIED OSTEOPOROSIS TYPE, UNSPECIFIED PATHOLOGICAL FRACTURE PRESENCE: Primary | ICD-10-CM

## 2019-05-15 PROCEDURE — 6360000002 HC RX W HCPCS: Performed by: NURSE PRACTITIONER

## 2019-05-15 PROCEDURE — 96372 THER/PROPH/DIAG INJ SC/IM: CPT

## 2019-05-15 PROCEDURE — 96413 CHEMO IV INFUSION 1 HR: CPT

## 2019-05-15 RX ADMIN — DENOSUMAB 60 MG: 60 INJECTION SUBCUTANEOUS at 14:46

## 2019-05-15 NOTE — DISCHARGE INSTR - COC
Continuity of Care Form    Patient Name: [place]   :  1932  MRN:  [id number]    Admit date:  5/15/2019  Discharge date:  ***    Code Status Order: No Order   Advance Directives:     Admitting Physician:  No admitting provider for patient encounter. PCP: Lili Spann MD    Discharging Nurse: [facility] Unit/Room#: No information available for this encounter. Discharging Unit Phone Number: ***    Emergency Contact:   Extended Emergency Contact Information  Primary Emergency Contact: KevinJanelle  Address: [facility] [address] [facility], [address] Phone: [phone]  Mobile Phone: [phone]  Relation: Child  Secondary Emergency Contact: Karen Nolasco And Catracho muller  Address: [facility] [address] Phone: [phone]  Mobile Phone: [phone]  Relation: Child    Past Surgical History:  Past Surgical History:   Procedure Laterality Date    EYE SURGERY      HIP SURGERY         Immunization History: There is no immunization history on file for this patient.     Active Problems:  Patient Active Problem List   Diagnosis Code    Low back pain M54.5    Depression F32.9    Dysuria R30.0    Osteoporosis M81.0       Isolation/Infection:   Isolation          No Isolation            Nurse Assessment:  Last Vital Signs: /67   Pulse 81   Resp 17     Last documented pain score (0-10 scale):    Last Weight:   Wt Readings from Last 1 Encounters:   19 125 lb (56.7 kg)     Mental Status:  {IP PT MENTAL STATUS:86394}    IV Access:  { MONY IV ACCESS:348340864}    Nursing Mobility/ADLs:  Walking   {CHP DME JPOJ:373469426}  Transfer  {CHP DME TWZK:805315755}  Bathing  {CHP DME IQJD:849438273}  Dressing  {CHP DME IOCT:099745370}  Toileting  {CHP DME AQUY:866790190}  Feeding  {CHP DME GAIK:148617653}  Med Admin  {CHP DME YPKH:100725504}  Med Delivery   { MONY MED Delivery:974539915}    Wound Care Documentation and Therapy:        Elimination:  Continence:   · Bowel: {YES / RONALDO:57972}  · Bladder: {YES / IL:34702}  Urinary Catheter: {Urinary Catheter:206718840}   Colostomy/Ileostomy/Ileal Conduit: {YES / DI:10284}       Date of Last BM: ***  No intake or output data in the 24 hours ending 05/15/19 1437  No intake/output data recorded.     Safety Concerns:     [facility] Safety Concerns:197962617}    Impairments/Disabilities:      [facility] Impairments/Disabilities:987688455}    Nutrition Therapy:  Current Nutrition Therapy:   [facility] Diet List:582793962}    Routes of Feeding: {CHP DME Other Feedings:716894160}  Liquids: {Slp liquid thickness:39844}  Daily Fluid Restriction: {CHP DME Yes amt example:990913765}  Last Modified Barium Swallow with Video (Video Swallowing Test): {Done Not Done DQQL:760602743}    Treatments at the Time of Hospital Discharge:   Respiratory Treatments: ***  Oxygen Therapy:  {Therapy; copd oxygen:19156}  Ventilator:    { CC Vent XUUW:639997358}    Rehab Therapies: {THERAPEUTIC INTERVENTION:7206308969}  Weight Bearing Status/Restrictions: [facility] Weight Bearin}  Other Medical Equipment (for information only, NOT a DME order):  {EQUIPMENT:851109191}  Other Treatments: ***    Patient's personal belongings (please select all that are sent with patient):  {[facility] DME Belongings:658520310}    RN SIGNATURE:  {Esignature:551518659}    CASE MANAGEMENT/SOCIAL WORK SECTION    Inpatient Status Date: ***    Readmission Risk Assessment Score:  Readmission Risk              Risk of Unplanned Readmission:        0           Discharging to Facility/ Agency   · Name:   · Address:  · Phone:  · Fax:    Dialysis Facility (if applicable)   · Name:  · Address:  · Dialysis Schedule:  · Phone:  · Fax:    / signature: {Esignature:834726508}    PHYSICIAN SECTION    Prognosis: {Prognosis:4702506217}    Condition at Discharge: [facility] Patient Condition:794995822}    Rehab Potential (if transferring to Rehab): {Prognosis:8304106881}    Recommended Labs or Other Treatments After Discharge: ***    Physician Certification: I certify the above information and transfer of Kishan Schroeder  is necessary for the continuing treatment of the diagnosis listed and that she requires {Admit to Appropriate Level of Care:79823} for {GREATER/LESS:795354003} 30 days.      Update Admission H&P: {CHP DME Changes in WHQEW:045391104}    PHYSICIAN SIGNATURE:  {Esignature:908290163}

## 2019-05-28 ENCOUNTER — TELEPHONE (OUTPATIENT)
Dept: INTERNAL MEDICINE | Age: 84
End: 2019-05-28

## 2019-05-28 DIAGNOSIS — R30.0 DYSURIA: Primary | ICD-10-CM

## 2019-05-28 NOTE — TELEPHONE ENCOUNTER
Daughter called wants c&s due to pt has been on antibiotic long term and if still infection wants another plan of care.

## 2019-05-29 DIAGNOSIS — R30.0 DYSURIA: ICD-10-CM

## 2019-05-29 DIAGNOSIS — R30.0 DYSURIA: Primary | ICD-10-CM

## 2019-05-31 LAB — URINE CULTURE, ROUTINE: NORMAL

## 2019-07-02 ENCOUNTER — TELEPHONE (OUTPATIENT)
Dept: INTERNAL MEDICINE | Age: 84
End: 2019-07-02

## 2019-07-02 DIAGNOSIS — R30.0 DYSURIA: Primary | ICD-10-CM

## 2019-07-07 LAB — URINE CULTURE, ROUTINE: NORMAL

## 2019-07-09 ENCOUNTER — OFFICE VISIT (OUTPATIENT)
Dept: INTERNAL MEDICINE | Age: 84
End: 2019-07-09
Payer: MEDICARE

## 2019-07-09 VITALS
OXYGEN SATURATION: 96 % | BODY MASS INDEX: 19.85 KG/M2 | WEIGHT: 123 LBS | HEART RATE: 84 BPM | DIASTOLIC BLOOD PRESSURE: 70 MMHG | TEMPERATURE: 99.5 F | SYSTOLIC BLOOD PRESSURE: 124 MMHG

## 2019-07-09 DIAGNOSIS — R30.0 DYSURIA: ICD-10-CM

## 2019-07-09 DIAGNOSIS — I67.81 ACUTE CEREBROVASCULAR INSUFFICIENCY: ICD-10-CM

## 2019-07-09 DIAGNOSIS — M81.0 OSTEOPOROSIS, UNSPECIFIED OSTEOPOROSIS TYPE, UNSPECIFIED PATHOLOGICAL FRACTURE PRESENCE: ICD-10-CM

## 2019-07-09 DIAGNOSIS — F32.A DEPRESSION, UNSPECIFIED DEPRESSION TYPE: Primary | ICD-10-CM

## 2019-07-09 PROCEDURE — 1090F PRES/ABSN URINE INCON ASSESS: CPT | Performed by: INTERNAL MEDICINE

## 2019-07-09 PROCEDURE — G8420 CALC BMI NORM PARAMETERS: HCPCS | Performed by: INTERNAL MEDICINE

## 2019-07-09 PROCEDURE — 99214 OFFICE O/P EST MOD 30 MIN: CPT | Performed by: INTERNAL MEDICINE

## 2019-07-09 PROCEDURE — 1036F TOBACCO NON-USER: CPT | Performed by: INTERNAL MEDICINE

## 2019-07-09 PROCEDURE — 1123F ACP DISCUSS/DSCN MKR DOCD: CPT | Performed by: INTERNAL MEDICINE

## 2019-07-09 PROCEDURE — G8427 DOCREV CUR MEDS BY ELIG CLIN: HCPCS | Performed by: INTERNAL MEDICINE

## 2019-07-09 PROCEDURE — 4040F PNEUMOC VAC/ADMIN/RCVD: CPT | Performed by: INTERNAL MEDICINE

## 2019-07-09 NOTE — PROGRESS NOTES
[facility]  [address] [facility]  [address] [facility] [address]  Dept: [phone]  Dept Fax: [phone]: [phone]      Visit Date: 7/9/2019    Elissa Osborne a 80 y.o. female who presents today for:  Chief Complaint   Patient presents with    6 Month Follow-Up     discuss urine results         HPI:     [place] is 80years old and in for six-month follow-up visit. She is got some depression and dementia and she is doing okay. She also has chronic urinary tract infections and left a urine at this time. She has osteoporosis and is on Prolia. No past medical history on file. Past Surgical History:   Procedure Laterality Date    EYE SURGERY      HIP SURGERY         No family history on file. Social History     Tobacco Use    Smoking status: Never Smoker    Smokeless tobacco: Never Used   Substance Use Topics    Alcohol use: No      Current Outpatient Medications   Medication Sig Dispense Refill    denosumab (PROLIA) 60 MG/ML SOLN SC injection Inject 60 mg into the skin once       No current facility-administered medications for this visit. Allergies   Allergen Reactions    Morphine Other (See Comments)    Penicillins Nausea Only         Subjective:     Review of Systems    Objective:      /70   Pulse 84   Temp 99.5 °F (37.5 °C)   Wt 123 lb (55.8 kg)   SpO2 96%   BMI 19.85 kg/m²    Physical Exam     Assessment:      Diagnosis Orders   1. Depression, unspecified depression type     2. Dysuria     3. Osteoporosis, unspecified osteoporosis type, unspecified pathological fracture presence              Plan:   History of depression which is stable. She is not on any medication at this time. She seems a little bit more confused than normal but all in all I think she is doing okay and she is got excellent care from her 2 daughters. History of dysuria.   She is very hard to get a history out of it she sounds like she is

## 2019-09-03 ENCOUNTER — TELEPHONE (OUTPATIENT)
Dept: INTERNAL MEDICINE | Age: 84
End: 2019-09-03

## 2019-09-03 DIAGNOSIS — R30.0 DYSURIA: ICD-10-CM

## 2019-09-03 DIAGNOSIS — R35.0 URINE FREQUENCY: Primary | ICD-10-CM

## 2019-09-03 DIAGNOSIS — R30.0 DYSURIA: Primary | ICD-10-CM

## 2019-09-03 DIAGNOSIS — R35.0 URINE FREQUENCY: ICD-10-CM

## 2019-09-03 LAB
BACTERIA: ABNORMAL /HPF
BILIRUBIN URINE: NEGATIVE
BLOOD, URINE: ABNORMAL
CLARITY: ABNORMAL
COLOR: YELLOW
COMMENT UA: ABNORMAL
EPITHELIAL CELLS, UA: 0 /HPF (ref 0–5)
GLUCOSE URINE: NEGATIVE MG/DL
HYALINE CASTS: 2 /HPF (ref 0–8)
KETONES, URINE: NEGATIVE MG/DL
LEUKOCYTE ESTERASE, URINE: ABNORMAL
NITRITE, URINE: NEGATIVE
PH UA: 5.5 (ref 5–8)
PROTEIN UA: NEGATIVE MG/DL
RBC UA: 5 /HPF (ref 0–4)
SPECIFIC GRAVITY UA: 1.02 (ref 1–1.03)
URINE REFLEX TO CULTURE: YES
UROBILINOGEN, URINE: 0.2 E.U./DL
WBC UA: 32 /HPF (ref 0–5)

## 2019-09-03 RX ORDER — SULFAMETHOXAZOLE AND TRIMETHOPRIM 800; 160 MG/1; MG/1
1 TABLET ORAL 2 TIMES DAILY
Qty: 28 TABLET | Refills: 0 | Status: ON HOLD | OUTPATIENT
Start: 2019-09-03 | End: 2019-09-12 | Stop reason: HOSPADM

## 2019-09-03 RX ORDER — NITROFURANTOIN 25; 75 MG/1; MG/1
100 CAPSULE ORAL 2 TIMES DAILY
Qty: 20 CAPSULE | Refills: 0 | Status: SHIPPED | OUTPATIENT
Start: 2019-09-03 | End: 2019-09-03

## 2019-09-03 NOTE — TELEPHONE ENCOUNTER
Jolie Cheadle would like the phone call with results of urine. Patient does better with bactrim, please call med [facility].

## 2019-09-06 ENCOUNTER — HOSPITAL ENCOUNTER (INPATIENT)
Age: 84
LOS: 3 days | Discharge: NURSING, REHAB OR LONG-TERM CARE | DRG: 536 | End: 2019-09-12
Attending: EMERGENCY MEDICINE | Admitting: INTERNAL MEDICINE
Payer: MEDICARE

## 2019-09-06 ENCOUNTER — APPOINTMENT (OUTPATIENT)
Dept: GENERAL RADIOLOGY | Age: 84
DRG: 536 | End: 2019-09-06
Payer: MEDICARE

## 2019-09-06 ENCOUNTER — APPOINTMENT (OUTPATIENT)
Dept: CT IMAGING | Age: 84
DRG: 536 | End: 2019-09-06
Payer: MEDICARE

## 2019-09-06 DIAGNOSIS — D72.829 LEUKOCYTOSIS, UNSPECIFIED TYPE: ICD-10-CM

## 2019-09-06 DIAGNOSIS — N39.0 URINARY TRACT INFECTION WITHOUT HEMATURIA, SITE UNSPECIFIED: ICD-10-CM

## 2019-09-06 DIAGNOSIS — S72.142A CLOSED DISPLACED INTERTROCHANTERIC FRACTURE OF LEFT FEMUR, INITIAL ENCOUNTER (HCC): Primary | ICD-10-CM
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
## 2019-09-06 LAB
ALBUMIN SERPL-MCNC: 4 G/DL (ref 3.5–5.2)
ALP BLD-CCNC: 47 U/L (ref 35–104)
ALT SERPL-CCNC: 13 U/L (ref 5–33)
ANION GAP SERPL CALCULATED.3IONS-SCNC: 11 MMOL/L (ref 7–19)
AST SERPL-CCNC: 16 U/L (ref 5–32)
BACTERIA: NEGATIVE /HPF
BASOPHILS ABSOLUTE: 0.1 K/UL (ref 0–0.2)
BASOPHILS RELATIVE PERCENT: 0.4 % (ref 0–1)
BILIRUB SERPL-MCNC: 0.4 MG/DL (ref 0.2–1.2)
BILIRUBIN URINE: NEGATIVE
BLOOD, URINE: NEGATIVE
BUN BLDV-MCNC: 14 MG/DL (ref 8–23)
CALCIUM SERPL-MCNC: 8.8 MG/DL (ref 8.8–10.2)
CHLORIDE BLD-SCNC: 105 MMOL/L (ref 98–111)
CLARITY: CLEAR
CO2: 23 MMOL/L (ref 22–29)
COLOR: YELLOW
CREAT SERPL-MCNC: 0.8 MG/DL (ref 0.5–0.9)
EOSINOPHILS ABSOLUTE: 0 K/UL (ref 0–0.6)
EOSINOPHILS RELATIVE PERCENT: 0 % (ref 0–5)
EPITHELIAL CELLS, UA: 0 /HPF (ref 0–5)
GFR NON-AFRICAN AMERICAN: >60
GLUCOSE BLD-MCNC: 137 MG/DL (ref 74–109)
GLUCOSE URINE: NEGATIVE MG/DL
HCT VFR BLD CALC: 39.2 % (ref 37–47)
HEMOGLOBIN: 12.8 G/DL (ref 12–16)
HYALINE CASTS: 3 /HPF (ref 0–8)
IMMATURE GRANULOCYTES #: 0.2 K/UL
KETONES, URINE: NEGATIVE MG/DL
LACTIC ACID: 1.6 MMOL/L (ref 0.5–1.9)
LEUKOCYTE ESTERASE, URINE: ABNORMAL
LYMPHOCYTES ABSOLUTE: 1 K/UL (ref 1.1–4.5)
LYMPHOCYTES RELATIVE PERCENT: 5.1 % (ref 20–40)
MCH RBC QN AUTO: 32.5 PG (ref 27–31)
MCHC RBC AUTO-ENTMCNC: 32.7 G/DL (ref 33–37)
MCV RBC AUTO: 99.5 FL (ref 81–99)
MONOCYTES ABSOLUTE: 1.9 K/UL (ref 0–0.9)
MONOCYTES RELATIVE PERCENT: 9.5 % (ref 0–10)
NEUTROPHILS ABSOLUTE: 16.8 K/UL (ref 1.5–7.5)
NEUTROPHILS RELATIVE PERCENT: 84 % (ref 50–65)
NITRITE, URINE: NEGATIVE
ORGANISM: ABNORMAL
PDW BLD-RTO: 13.6 % (ref 11.5–14.5)
PH UA: 6 (ref 5–8)
PLATELET # BLD: 194 K/UL (ref 130–400)
PMV BLD AUTO: 9.8 FL (ref 9.4–12.3)
POTASSIUM SERPL-SCNC: 4.1 MMOL/L (ref 3.5–5)
PROTEIN UA: NEGATIVE MG/DL
RBC # BLD: 3.94 M/UL (ref 4.2–5.4)
RBC UA: 1 /HPF (ref 0–4)
SODIUM BLD-SCNC: 139 MMOL/L (ref 136–145)
SPECIFIC GRAVITY UA: 1.02 (ref 1–1.03)
TOTAL CK: 59 U/L (ref 26–192)
TOTAL PROTEIN: 7 G/DL (ref 6.6–8.7)
TROPONIN: <0.01 NG/ML (ref 0–0.03)
URINE CULTURE, ROUTINE: ABNORMAL
URINE CULTURE, ROUTINE: ABNORMAL
URINE REFLEX TO CULTURE: YES
UROBILINOGEN, URINE: 0.2 E.U./DL
WBC # BLD: 20 K/UL (ref 4.8–10.8)
WBC UA: 1 /HPF (ref 0–5)

## 2019-09-06 PROCEDURE — 83605 ASSAY OF LACTIC ACID: CPT

## 2019-09-06 PROCEDURE — 36415 COLL VENOUS BLD VENIPUNCTURE: CPT

## 2019-09-06 PROCEDURE — 87040 BLOOD CULTURE FOR BACTERIA: CPT

## 2019-09-06 PROCEDURE — 84145 PROCALCITONIN (PCT): CPT

## 2019-09-06 PROCEDURE — 85025 COMPLETE CBC W/AUTO DIFF WBC: CPT

## 2019-09-06 PROCEDURE — 82550 ASSAY OF CK (CPK): CPT

## 2019-09-06 PROCEDURE — 2580000003 HC RX 258: Performed by: EMERGENCY MEDICINE

## 2019-09-06 PROCEDURE — 6360000002 HC RX W HCPCS: Performed by: EMERGENCY MEDICINE

## 2019-09-06 PROCEDURE — G0378 HOSPITAL OBSERVATION PER HR: HCPCS

## 2019-09-06 PROCEDURE — 72125 CT NECK SPINE W/O DYE: CPT

## 2019-09-06 PROCEDURE — 80053 COMPREHEN METABOLIC PANEL: CPT

## 2019-09-06 PROCEDURE — 71046 X-RAY EXAM CHEST 2 VIEWS: CPT

## 2019-09-06 PROCEDURE — 70450 CT HEAD/BRAIN W/O DYE: CPT

## 2019-09-06 PROCEDURE — 72192 CT PELVIS W/O DYE: CPT

## 2019-09-06 PROCEDURE — 87086 URINE CULTURE/COLONY COUNT: CPT

## 2019-09-06 PROCEDURE — 84484 ASSAY OF TROPONIN QUANT: CPT

## 2019-09-06 PROCEDURE — 96374 THER/PROPH/DIAG INJ IV PUSH: CPT

## 2019-09-06 PROCEDURE — 73521 X-RAY EXAM HIPS BI 2 VIEWS: CPT

## 2019-09-06 PROCEDURE — 81003 URINALYSIS AUTO W/O SCOPE: CPT

## 2019-09-06 PROCEDURE — 93005 ELECTROCARDIOGRAM TRACING: CPT

## 2019-09-06 PROCEDURE — 85027 COMPLETE CBC AUTOMATED: CPT

## 2019-09-06 PROCEDURE — 6370000000 HC RX 637 (ALT 250 FOR IP): Performed by: EMERGENCY MEDICINE

## 2019-09-06 PROCEDURE — 99285 EMERGENCY DEPT VISIT HI MDM: CPT

## 2019-09-06 RX ORDER — ACETAMINOPHEN 325 MG/1
650 TABLET ORAL ONCE
Status: COMPLETED | OUTPATIENT
Start: 2019-09-06 | End: 2019-09-06

## 2019-09-06 RX ADMIN — CEFTRIAXONE 1 G: 1 INJECTION, POWDER, FOR SOLUTION INTRAMUSCULAR; INTRAVENOUS at 22:22

## 2019-09-06 RX ADMIN — ACETAMINOPHEN 650 MG: 325 TABLET ORAL at 20:58

## 2019-09-06 ASSESSMENT — PAIN SCALES - GENERAL
PAINLEVEL_OUTOF10: 10
PAINLEVEL_OUTOF10: 5

## 2019-09-06 ASSESSMENT — PAIN DESCRIPTION - FREQUENCY: FREQUENCY: CONTINUOUS

## 2019-09-06 ASSESSMENT — PAIN DESCRIPTION - LOCATION: LOCATION: HIP

## 2019-09-06 ASSESSMENT — PAIN DESCRIPTION - PAIN TYPE: TYPE: ACUTE PAIN

## 2019-09-06 ASSESSMENT — PAIN DESCRIPTION - ONSET: ONSET: ON-GOING

## 2019-09-06 ASSESSMENT — PAIN DESCRIPTION - ORIENTATION: ORIENTATION: LEFT

## 2019-09-06 ASSESSMENT — PAIN DESCRIPTION - PROGRESSION: CLINICAL_PROGRESSION: NOT CHANGED

## 2019-09-06 ASSESSMENT — PAIN DESCRIPTION - DESCRIPTORS: DESCRIPTORS: THROBBING

## 2019-09-06 NOTE — ED PROVIDER NOTES
[facility]  eMERGENCY dEPARTMENT eNCOUnter      Pt Name: [place]  MRN: [id number]  [place] 5/22/1932  Date of evaluation: 9/6/2019  Provider: Tg Sagastume MD    [address] [place] [address]       Chief Complaint   Patient presents with   Kaylin Pacheco    Hip Pain         HISTORY OF PRESENT ILLNESS   (Location/Symptom, Timing/Onset,Context/Setting, Quality, Duration, Modifying Factors, Severity)  Note limiting factors. [place] is a 80 y.o. female who presents to the emergency department due to fall. Patient said she was walking to the house and tripped and fell. Landed on her left hip. Having pain left hip since then. Has been able to ambulate. No other injuries. Does not think she hit her head. Does have dementia. Family said she has been a little more confused over the last week and is currently on antibiotics for UTI. Was put on antibiotics by PCP 3 days ago. Patient has no complaints at this time other than left hip pain which she described as mild. Denies any other injuries. No neck or back pain. No arm or leg pain other than left hip. HPI    NursingNotes were reviewed. REVIEW OF SYSTEMS    (2-9 systems for level 4, 10 or more for level 5)     Review of Systems   Constitutional: Negative for fever. Eyes: Negative for pain. Respiratory: Negative for shortness of breath. Cardiovascular: Negative for chest pain and palpitations. Gastrointestinal: Negative for abdominal pain, diarrhea and vomiting. Genitourinary: Negative for dysuria. Musculoskeletal: Negative for back pain and neck pain. Skin: Negative for rash. Neurological: Negative for weakness and headaches. Psychiatric/Behavioral: Positive for confusion (has mild confusion at baseline--slightly worse for several days per family). All other systems reviewed and are negative. A complete review of systems was performed and is negative except as noted above in the HPI.        PAST MEDICAL HISTORY   History reviewed. No pertinent past medical history. SURGICAL HISTORY       Past Surgical History:   Procedure Laterality Date    EYE SURGERY      HIP SURGERY           CURRENT MEDICATIONS       Current Discharge Medication List      CONTINUE these medications which have NOT CHANGED    Details   sulfamethoxazole-trimethoprim (BACTRIM DS;SEPTRA DS) 800-160 MG per tablet Take 1 tablet by mouth 2 times daily for 14 days  Qty: 28 tablet, Refills: 0      denosumab (PROLIA) 60 MG/ML SOLN SC injection Inject 60 mg into the skin once             ALLERGIES     Morphine and Penicillins    FAMILY HISTORY     History reviewed. No pertinent family history. SOCIAL HISTORY       Social History     Socioeconomic History    Marital status:       Spouse name: None    Number of children: None    Years of education: None    Highest education level: None   Occupational History    None   Social Needs    Financial resource strain: None    Food insecurity:     Worry: None     Inability: None    Transportation needs:     Medical: None     Non-medical: None   Tobacco Use    Smoking status: Never Smoker    Smokeless tobacco: Never Used   Substance and Sexual Activity    Alcohol use: No    Drug use: No    Sexual activity: None   Lifestyle    Physical activity:     Days per week: None     Minutes per session: None    Stress: None   Relationships    Social connections:     Talks on phone: None     Gets together: None     Attends [employer] service: None     Active member of club or organization: None     Attends meetings of clubs or organizations: None     Relationship status: None    Intimate partner violence:     Fear of current or ex partner: None     Emotionally abused: None     Physically abused: None     Forced sexual activity: None   Other Topics Concern    None   Social History Narrative    None       SCREENINGS    Quiana Coma Scale  Eye Opening: Spontaneous  Best Verbal Response: Oriented  Best Motor visualized and preliminarily interpreted by the emergency physician with the below findings:    Interpretation per the Radiologist below, if available at the time of this note:    CT PELVIS WO CONTRAST Additional Contrast? None   Final Result   1. Minimally displaced horizontal to mildly obliquely oriented   fracture of the intertrochanteric portion of the left femur. Signed by Dr Joshua Daly on 9/6/2019 9:07 PM      CT Head WO Contrast   Final Result   Impression:    1. No CT evidence of an acute intracranial process. Signed by Dr Joshua Daly on 9/6/2019 7:26 PM      CT Cervical Spine WO Contrast   Final Result   1. No CT evidence of acute bony injury to the cervical spine. 2. Multilevel disc degeneration and spondylosis. Signed by Dr Joshua Daly on 9/6/2019 7:41 PM      XR CHEST STANDARD (2 VW)   Final Result   Impression:   1. No acute cardiopulmonary process. Signed by Dr Joshua Daly on 9/6/2019 6:56 PM      XR HIP BILATERAL W AP PELVIS (2 VIEWS)   Final Result   1. Changes from bilateral hip arthroplasty. 2. No hardware complication or acute osseous abnormality.    Signed by Dr Joshua Daly on 9/6/2019 6:52 PM            LABS:  Labs Reviewed   COMPREHENSIVE METABOLIC PANEL - Abnormal; Notable for the following components:       Result Value    Glucose 137 (*)     All other components within normal limits   URINE RT REFLEX TO CULTURE - Abnormal; Notable for the following components:    Leukocyte Esterase, Urine SMALL (*)     All other components within normal limits   CBC WITH AUTO DIFFERENTIAL - Abnormal; Notable for the following components:    WBC 20.0 (*)     RBC 3.94 (*)     MCV 99.5 (*)     MCH 32.5 (*)     MCHC 32.7 (*)     Neutrophils % 84.0 (*)     Lymphocytes % 5.1 (*)     Neutrophils Absolute 16.8 (*)     Lymphocytes Absolute 1.0 (*)     Monocytes Absolute 1.90 (*)     All other components within normal limits   URINE CULTURE   CULTURE BLOOD #1   CULTURE BLOOD #2

## 2019-09-07 LAB
AMMONIA: 31 UMOL/L (ref 11–51)
ANION GAP SERPL CALCULATED.3IONS-SCNC: 7 MMOL/L (ref 7–19)
BASOPHILS ABSOLUTE: 0.1 K/UL (ref 0–0.2)
BASOPHILS RELATIVE PERCENT: 0.7 % (ref 0–1)
BUN BLDV-MCNC: 15 MG/DL (ref 8–23)
CALCIUM SERPL-MCNC: 8.1 MG/DL (ref 8.8–10.2)
CHLORIDE BLD-SCNC: 110 MMOL/L (ref 98–111)
CO2: 19 MMOL/L (ref 22–29)
CREAT SERPL-MCNC: 0.7 MG/DL (ref 0.5–0.9)
EOSINOPHILS ABSOLUTE: 0.1 K/UL (ref 0–0.6)
EOSINOPHILS RELATIVE PERCENT: 1 % (ref 0–5)
GFR NON-AFRICAN AMERICAN: >60
GLUCOSE BLD-MCNC: 100 MG/DL (ref 74–109)
HCT VFR BLD CALC: 41 % (ref 37–47)
HEMOGLOBIN: 12.5 G/DL (ref 12–16)
IMMATURE GRANULOCYTES #: 0.1 K/UL
LYMPHOCYTES ABSOLUTE: 1.5 K/UL (ref 1.1–4.5)
LYMPHOCYTES RELATIVE PERCENT: 12.8 % (ref 20–40)
MCH RBC QN AUTO: 33 PG (ref 27–31)
MCHC RBC AUTO-ENTMCNC: 30.5 G/DL (ref 33–37)
MCV RBC AUTO: 108.2 FL (ref 81–99)
MONOCYTES ABSOLUTE: 1.5 K/UL (ref 0–0.9)
MONOCYTES RELATIVE PERCENT: 12.9 % (ref 0–10)
NEUTROPHILS ABSOLUTE: 8.1 K/UL (ref 1.5–7.5)
NEUTROPHILS RELATIVE PERCENT: 71.8 % (ref 50–65)
PDW BLD-RTO: 13.9 % (ref 11.5–14.5)
PLATELET # BLD: 153 K/UL (ref 130–400)
PMV BLD AUTO: 10.6 FL (ref 9.4–12.3)
POTASSIUM REFLEX MAGNESIUM: 5.1 MMOL/L (ref 3.5–5)
RBC # BLD: 3.79 M/UL (ref 4.2–5.4)
SODIUM BLD-SCNC: 136 MMOL/L (ref 136–145)
TSH SERPL DL<=0.05 MIU/L-ACNC: 2.26 UIU/ML (ref 0.27–4.2)
VITAMIN B-12: 451 PG/ML (ref 211–946)
WBC # BLD: 11.3 K/UL (ref 4.8–10.8)

## 2019-09-07 PROCEDURE — 6360000002 HC RX W HCPCS: Performed by: INTERNAL MEDICINE

## 2019-09-07 PROCEDURE — 97530 THERAPEUTIC ACTIVITIES: CPT

## 2019-09-07 PROCEDURE — G0378 HOSPITAL OBSERVATION PER HR: HCPCS

## 2019-09-07 PROCEDURE — 82140 ASSAY OF AMMONIA: CPT

## 2019-09-07 PROCEDURE — 82607 VITAMIN B-12: CPT

## 2019-09-07 PROCEDURE — 97110 THERAPEUTIC EXERCISES: CPT

## 2019-09-07 PROCEDURE — 6370000000 HC RX 637 (ALT 250 FOR IP): Performed by: INTERNAL MEDICINE

## 2019-09-07 PROCEDURE — 84443 ASSAY THYROID STIM HORMONE: CPT

## 2019-09-07 PROCEDURE — 97161 PT EVAL LOW COMPLEX 20 MIN: CPT

## 2019-09-07 PROCEDURE — 85025 COMPLETE CBC W/AUTO DIFF WBC: CPT

## 2019-09-07 PROCEDURE — 2580000003 HC RX 258: Performed by: INTERNAL MEDICINE

## 2019-09-07 PROCEDURE — 36415 COLL VENOUS BLD VENIPUNCTURE: CPT

## 2019-09-07 PROCEDURE — 80048 BASIC METABOLIC PNL TOTAL CA: CPT

## 2019-09-07 PROCEDURE — 96376 TX/PRO/DX INJ SAME DRUG ADON: CPT

## 2019-09-07 RX ORDER — ACETAMINOPHEN 325 MG/1
650 TABLET ORAL EVERY 8 HOURS PRN
Status: DISCONTINUED | OUTPATIENT
Start: 2019-09-07 | End: 2019-09-12 | Stop reason: HOSPADM

## 2019-09-07 RX ORDER — ONDANSETRON 2 MG/ML
4 INJECTION INTRAMUSCULAR; INTRAVENOUS EVERY 6 HOURS PRN
Status: DISCONTINUED | OUTPATIENT
Start: 2019-09-07 | End: 2019-09-12 | Stop reason: HOSPADM

## 2019-09-07 RX ORDER — SENNA PLUS 8.6 MG/1
1 TABLET ORAL DAILY PRN
Status: DISCONTINUED | OUTPATIENT
Start: 2019-09-07 | End: 2019-09-12 | Stop reason: HOSPADM

## 2019-09-07 RX ORDER — SODIUM CHLORIDE 9 MG/ML
INJECTION, SOLUTION INTRAVENOUS CONTINUOUS
Status: DISCONTINUED | OUTPATIENT
Start: 2019-09-07 | End: 2019-09-08

## 2019-09-07 RX ADMIN — ACETAMINOPHEN 650 MG: 325 TABLET ORAL at 19:13

## 2019-09-07 RX ADMIN — Medication 1 G: at 22:15

## 2019-09-07 RX ADMIN — SODIUM CHLORIDE: 9 INJECTION, SOLUTION INTRAVENOUS at 03:59

## 2019-09-07 ASSESSMENT — PAIN DESCRIPTION - LOCATION
LOCATION: PELVIS
LOCATION: HIP

## 2019-09-07 ASSESSMENT — PAIN SCALES - WONG BAKER: WONGBAKER_NUMERICALRESPONSE: 4

## 2019-09-07 ASSESSMENT — ENCOUNTER SYMPTOMS
DIARRHEA: 0
BACK PAIN: 0
SHORTNESS OF BREATH: 0
ABDOMINAL PAIN: 0
VOMITING: 0
EYE PAIN: 0

## 2019-09-07 ASSESSMENT — PAIN DESCRIPTION - PAIN TYPE
TYPE: ACUTE PAIN
TYPE: ACUTE PAIN

## 2019-09-07 ASSESSMENT — PAIN SCALES - GENERAL
PAINLEVEL_OUTOF10: 1
PAINLEVEL_OUTOF10: 5

## 2019-09-07 ASSESSMENT — PAIN - FUNCTIONAL ASSESSMENT: PAIN_FUNCTIONAL_ASSESSMENT: PREVENTS OR INTERFERES SOME ACTIVE ACTIVITIES AND ADLS

## 2019-09-07 ASSESSMENT — PAIN DESCRIPTION - ORIENTATION: ORIENTATION: LEFT

## 2019-09-07 ASSESSMENT — PAIN DESCRIPTION - DESCRIPTORS: DESCRIPTORS: SORE;SHARP

## 2019-09-08 LAB
ANION GAP SERPL CALCULATED.3IONS-SCNC: 12 MMOL/L (ref 7–19)
BACTERIA: NEGATIVE /HPF
BASOPHILS ABSOLUTE: 0.1 K/UL (ref 0–0.2)
BASOPHILS RELATIVE PERCENT: 0.9 % (ref 0–1)
BILIRUBIN URINE: NEGATIVE
BLOOD, URINE: NEGATIVE
BUN BLDV-MCNC: 19 MG/DL (ref 8–23)
CALCIUM SERPL-MCNC: 8.2 MG/DL (ref 8.8–10.2)
CHLORIDE BLD-SCNC: 108 MMOL/L (ref 98–111)
CLARITY: CLEAR
CO2: 17 MMOL/L (ref 22–29)
COLOR: YELLOW
CREAT SERPL-MCNC: 0.8 MG/DL (ref 0.5–0.9)
EKG P AXIS: 32 DEGREES
EKG P-R INTERVAL: 150 MS
EKG Q-T INTERVAL: 388 MS
EKG QRS DURATION: 78 MS
EKG QTC CALCULATION (BAZETT): 447 MS
EKG T AXIS: -7 DEGREES
EOSINOPHILS ABSOLUTE: 0.3 K/UL (ref 0–0.6)
EOSINOPHILS RELATIVE PERCENT: 2.9 % (ref 0–5)
EPITHELIAL CELLS, UA: 3 /HPF (ref 0–5)
GFR NON-AFRICAN AMERICAN: >60
GLUCOSE BLD-MCNC: 106 MG/DL (ref 74–109)
GLUCOSE URINE: NEGATIVE MG/DL
HCT VFR BLD CALC: 37.7 % (ref 37–47)
HEMOGLOBIN: 11.7 G/DL (ref 12–16)
HYALINE CASTS: 4 /HPF (ref 0–8)
IMMATURE GRANULOCYTES #: 0.1 K/UL
KETONES, URINE: NEGATIVE MG/DL
LEUKOCYTE ESTERASE, URINE: ABNORMAL
LYMPHOCYTES ABSOLUTE: 1.6 K/UL (ref 1.1–4.5)
LYMPHOCYTES RELATIVE PERCENT: 13.6 % (ref 20–40)
MCH RBC QN AUTO: 33 PG (ref 27–31)
MCHC RBC AUTO-ENTMCNC: 31 G/DL (ref 33–37)
MCV RBC AUTO: 106.2 FL (ref 81–99)
MONOCYTES ABSOLUTE: 1.5 K/UL (ref 0–0.9)
MONOCYTES RELATIVE PERCENT: 13.1 % (ref 0–10)
NEUTROPHILS ABSOLUTE: 7.9 K/UL (ref 1.5–7.5)
NEUTROPHILS RELATIVE PERCENT: 68.5 % (ref 50–65)
NITRITE, URINE: NEGATIVE
PDW BLD-RTO: 13.9 % (ref 11.5–14.5)
PH UA: 6 (ref 5–8)
PLATELET # BLD: 150 K/UL (ref 130–400)
PMV BLD AUTO: 10.3 FL (ref 9.4–12.3)
POTASSIUM REFLEX MAGNESIUM: 4.5 MMOL/L (ref 3.5–5)
PROTEIN UA: NEGATIVE MG/DL
RBC # BLD: 3.55 M/UL (ref 4.2–5.4)
RBC UA: 2 /HPF (ref 0–4)
SODIUM BLD-SCNC: 137 MMOL/L (ref 136–145)
SPECIFIC GRAVITY UA: 1.01 (ref 1–1.03)
URINE CULTURE, ROUTINE: NORMAL
UROBILINOGEN, URINE: 0.2 E.U./DL
WBC # BLD: 11.6 K/UL (ref 4.8–10.8)
WBC UA: 13 /HPF (ref 0–5)

## 2019-09-08 PROCEDURE — 97530 THERAPEUTIC ACTIVITIES: CPT

## 2019-09-08 PROCEDURE — 96376 TX/PRO/DX INJ SAME DRUG ADON: CPT

## 2019-09-08 PROCEDURE — G0378 HOSPITAL OBSERVATION PER HR: HCPCS

## 2019-09-08 PROCEDURE — 80048 BASIC METABOLIC PNL TOTAL CA: CPT

## 2019-09-08 PROCEDURE — 36415 COLL VENOUS BLD VENIPUNCTURE: CPT

## 2019-09-08 PROCEDURE — 85025 COMPLETE CBC W/AUTO DIFF WBC: CPT

## 2019-09-08 PROCEDURE — 87086 URINE CULTURE/COLONY COUNT: CPT

## 2019-09-08 PROCEDURE — 2580000003 HC RX 258: Performed by: INTERNAL MEDICINE

## 2019-09-08 PROCEDURE — 6370000000 HC RX 637 (ALT 250 FOR IP): Performed by: INTERNAL MEDICINE

## 2019-09-08 PROCEDURE — 81001 URINALYSIS AUTO W/SCOPE: CPT

## 2019-09-08 PROCEDURE — 97116 GAIT TRAINING THERAPY: CPT

## 2019-09-08 PROCEDURE — 6360000002 HC RX W HCPCS: Performed by: INTERNAL MEDICINE

## 2019-09-08 RX ADMIN — ACETAMINOPHEN 650 MG: 325 TABLET ORAL at 19:19

## 2019-09-08 RX ADMIN — ACETAMINOPHEN 650 MG: 325 TABLET ORAL at 09:56

## 2019-09-08 RX ADMIN — ACETAMINOPHEN 650 MG: 325 TABLET ORAL at 02:56

## 2019-09-08 RX ADMIN — Medication 1 G: at 21:36

## 2019-09-08 ASSESSMENT — PAIN SCALES - GENERAL
PAINLEVEL_OUTOF10: 3
PAINLEVEL_OUTOF10: 3
PAINLEVEL_OUTOF10: 5
PAINLEVEL_OUTOF10: 1

## 2019-09-08 ASSESSMENT — PAIN DESCRIPTION - LOCATION
LOCATION: LEG
LOCATION: LEG

## 2019-09-08 ASSESSMENT — PAIN DESCRIPTION - ORIENTATION
ORIENTATION: LEFT
ORIENTATION: LEFT

## 2019-09-08 ASSESSMENT — PAIN - FUNCTIONAL ASSESSMENT: PAIN_FUNCTIONAL_ASSESSMENT: PREVENTS OR INTERFERES SOME ACTIVE ACTIVITIES AND ADLS

## 2019-09-08 ASSESSMENT — PAIN DESCRIPTION - ONSET: ONSET: ON-GOING

## 2019-09-08 ASSESSMENT — PAIN DESCRIPTION - PAIN TYPE
TYPE: ACUTE PAIN
TYPE: ACUTE PAIN

## 2019-09-08 ASSESSMENT — PAIN DESCRIPTION - DESCRIPTORS: DESCRIPTORS: ACHING;SORE

## 2019-09-08 ASSESSMENT — PAIN DESCRIPTION - FREQUENCY: FREQUENCY: CONTINUOUS

## 2019-09-08 ASSESSMENT — PAIN DESCRIPTION - PROGRESSION: CLINICAL_PROGRESSION: NOT CHANGED

## 2019-09-09 PROBLEM — S72.002A CLOSED HIP FRACTURE, LEFT, INITIAL ENCOUNTER (HCC): Status: ACTIVE | Noted: 2019-09-09

## 2019-09-09 PROCEDURE — 2580000003 HC RX 258: Performed by: INTERNAL MEDICINE

## 2019-09-09 PROCEDURE — 6360000002 HC RX W HCPCS: Performed by: INTERNAL MEDICINE

## 2019-09-09 PROCEDURE — 1210000000 HC MED SURG R&B

## 2019-09-09 PROCEDURE — 6370000000 HC RX 637 (ALT 250 FOR IP): Performed by: INTERNAL MEDICINE

## 2019-09-09 PROCEDURE — 97530 THERAPEUTIC ACTIVITIES: CPT

## 2019-09-09 PROCEDURE — 97116 GAIT TRAINING THERAPY: CPT

## 2019-09-09 RX ADMIN — Medication 1 G: at 20:55

## 2019-09-09 RX ADMIN — ENOXAPARIN SODIUM 40 MG: 40 INJECTION SUBCUTANEOUS at 14:44

## 2019-09-09 RX ADMIN — ACETAMINOPHEN 650 MG: 325 TABLET ORAL at 04:15

## 2019-09-09 RX ADMIN — ACETAMINOPHEN 650 MG: 325 TABLET ORAL at 14:44

## 2019-09-09 RX ADMIN — ACETAMINOPHEN 650 MG: 325 TABLET ORAL at 20:54

## 2019-09-09 ASSESSMENT — PAIN DESCRIPTION - ONSET
ONSET: ON-GOING
ONSET: ON-GOING

## 2019-09-09 ASSESSMENT — PAIN DESCRIPTION - DESCRIPTORS
DESCRIPTORS: ACHING
DESCRIPTORS: SHARP

## 2019-09-09 ASSESSMENT — PAIN DESCRIPTION - ORIENTATION
ORIENTATION: LEFT

## 2019-09-09 ASSESSMENT — PAIN SCALES - GENERAL
PAINLEVEL_OUTOF10: 4
PAINLEVEL_OUTOF10: 2
PAINLEVEL_OUTOF10: 0
PAINLEVEL_OUTOF10: 2
PAINLEVEL_OUTOF10: 1
PAINLEVEL_OUTOF10: 0
PAINLEVEL_OUTOF10: 2

## 2019-09-09 ASSESSMENT — PAIN DESCRIPTION - FREQUENCY
FREQUENCY: INTERMITTENT
FREQUENCY: INTERMITTENT

## 2019-09-09 ASSESSMENT — PAIN DESCRIPTION - PAIN TYPE
TYPE: ACUTE PAIN

## 2019-09-09 ASSESSMENT — PAIN DESCRIPTION - LOCATION
LOCATION: LEG
LOCATION: HIP
LOCATION: HIP

## 2019-09-09 ASSESSMENT — PAIN DESCRIPTION - PROGRESSION
CLINICAL_PROGRESSION: NOT CHANGED
CLINICAL_PROGRESSION: NOT CHANGED

## 2019-09-09 ASSESSMENT — PAIN - FUNCTIONAL ASSESSMENT
PAIN_FUNCTIONAL_ASSESSMENT: PREVENTS OR INTERFERES SOME ACTIVE ACTIVITIES AND ADLS
PAIN_FUNCTIONAL_ASSESSMENT: ACTIVITIES ARE NOT PREVENTED

## 2019-09-10 LAB
ANION GAP SERPL CALCULATED.3IONS-SCNC: 9 MMOL/L (ref 7–19)
BASOPHILS ABSOLUTE: 0 K/UL (ref 0–0.2)
BASOPHILS RELATIVE PERCENT: 0 % (ref 0–1)
BUN BLDV-MCNC: 12 MG/DL (ref 8–23)
CALCIUM SERPL-MCNC: 8.8 MG/DL (ref 8.8–10.2)
CHLORIDE BLD-SCNC: 104 MMOL/L (ref 98–111)
CO2: 28 MMOL/L (ref 22–29)
CREAT SERPL-MCNC: 0.7 MG/DL (ref 0.5–0.9)
EOSINOPHILS ABSOLUTE: 0.85 K/UL (ref 0–0.6)
EOSINOPHILS RELATIVE PERCENT: 9 % (ref 0–5)
GFR NON-AFRICAN AMERICAN: >60
GLUCOSE BLD-MCNC: 101 MG/DL (ref 74–109)
HCT VFR BLD CALC: 31.6 % (ref 37–47)
HEMOGLOBIN: 10.3 G/DL (ref 12–16)
IMMATURE GRANULOCYTES #: 0.1 K/UL
LYMPHOCYTES ABSOLUTE: 1.7 K/UL (ref 1.1–4.5)
LYMPHOCYTES RELATIVE PERCENT: 17 % (ref 20–40)
MACROCYTES: ABNORMAL
MCH RBC QN AUTO: 32.7 PG (ref 27–31)
MCHC RBC AUTO-ENTMCNC: 32.6 G/DL (ref 33–37)
MCV RBC AUTO: 100.3 FL (ref 81–99)
MONOCYTES ABSOLUTE: 0.2 K/UL (ref 0–0.9)
MONOCYTES RELATIVE PERCENT: 2 % (ref 0–10)
NEUTROPHILS ABSOLUTE: 6.7 K/UL (ref 1.5–7.5)
NEUTROPHILS RELATIVE PERCENT: 71 % (ref 50–65)
PDW BLD-RTO: 13.7 % (ref 11.5–14.5)
PLATELET # BLD: 121 K/UL (ref 130–400)
PLATELET SLIDE REVIEW: ABNORMAL
PMV BLD AUTO: 11.5 FL (ref 9.4–12.3)
POTASSIUM REFLEX MAGNESIUM: 4.4 MMOL/L (ref 3.5–5)
PROCALCITONIN: <0.07 NG/ML
RBC # BLD: 3.15 M/UL (ref 4.2–5.4)
REASON FOR REJECTION: NORMAL
REJECTED TEST: NORMAL
SODIUM BLD-SCNC: 141 MMOL/L (ref 136–145)
URINE CULTURE, ROUTINE: NORMAL
WBC # BLD: 9.4 K/UL (ref 4.8–10.8)

## 2019-09-10 PROCEDURE — 1210000000 HC MED SURG R&B

## 2019-09-10 PROCEDURE — 6360000002 HC RX W HCPCS: Performed by: INTERNAL MEDICINE

## 2019-09-10 PROCEDURE — 6370000000 HC RX 637 (ALT 250 FOR IP): Performed by: INTERNAL MEDICINE

## 2019-09-10 PROCEDURE — 36415 COLL VENOUS BLD VENIPUNCTURE: CPT

## 2019-09-10 PROCEDURE — 80048 BASIC METABOLIC PNL TOTAL CA: CPT

## 2019-09-10 PROCEDURE — 85025 COMPLETE CBC W/AUTO DIFF WBC: CPT

## 2019-09-10 PROCEDURE — 97116 GAIT TRAINING THERAPY: CPT

## 2019-09-10 RX ADMIN — ACETAMINOPHEN 650 MG: 325 TABLET ORAL at 17:03

## 2019-09-10 RX ADMIN — ENOXAPARIN SODIUM 40 MG: 40 INJECTION SUBCUTANEOUS at 14:23

## 2019-09-10 ASSESSMENT — PAIN SCALES - GENERAL
PAINLEVEL_OUTOF10: 5
PAINLEVEL_OUTOF10: 0

## 2019-09-11 LAB
BLOOD CULTURE, ROUTINE: NORMAL
CULTURE, BLOOD 2: NORMAL
HCT VFR BLD CALC: 34 % (ref 37–47)
HEMOGLOBIN: 11.7 G/DL (ref 12–16)

## 2019-09-11 PROCEDURE — 1210000000 HC MED SURG R&B

## 2019-09-11 PROCEDURE — 36415 COLL VENOUS BLD VENIPUNCTURE: CPT

## 2019-09-11 PROCEDURE — 6360000002 HC RX W HCPCS: Performed by: INTERNAL MEDICINE

## 2019-09-11 PROCEDURE — 85018 HEMOGLOBIN: CPT

## 2019-09-11 PROCEDURE — 85014 HEMATOCRIT: CPT

## 2019-09-11 PROCEDURE — 6370000000 HC RX 637 (ALT 250 FOR IP): Performed by: INTERNAL MEDICINE

## 2019-09-11 PROCEDURE — 97116 GAIT TRAINING THERAPY: CPT

## 2019-09-11 RX ADMIN — ENOXAPARIN SODIUM 40 MG: 40 INJECTION SUBCUTANEOUS at 08:23

## 2019-09-11 RX ADMIN — ACETAMINOPHEN 650 MG: 325 TABLET ORAL at 19:49

## 2019-09-11 RX ADMIN — ACETAMINOPHEN 650 MG: 325 TABLET ORAL at 09:28

## 2019-09-11 RX ADMIN — ACETAMINOPHEN 650 MG: 325 TABLET ORAL at 00:07

## 2019-09-11 ASSESSMENT — PAIN - FUNCTIONAL ASSESSMENT: PAIN_FUNCTIONAL_ASSESSMENT: ACTIVITIES ARE NOT PREVENTED

## 2019-09-11 ASSESSMENT — PAIN DESCRIPTION - FREQUENCY: FREQUENCY: INTERMITTENT

## 2019-09-11 ASSESSMENT — PAIN SCALES - GENERAL
PAINLEVEL_OUTOF10: 3
PAINLEVEL_OUTOF10: 5
PAINLEVEL_OUTOF10: 1
PAINLEVEL_OUTOF10: 0
PAINLEVEL_OUTOF10: 3

## 2019-09-11 ASSESSMENT — PAIN DESCRIPTION - DESCRIPTORS: DESCRIPTORS: ACHING

## 2019-09-11 ASSESSMENT — PAIN DESCRIPTION - PROGRESSION: CLINICAL_PROGRESSION: GRADUALLY IMPROVING

## 2019-09-11 ASSESSMENT — PAIN DESCRIPTION - PAIN TYPE: TYPE: ACUTE PAIN

## 2019-09-11 ASSESSMENT — PAIN DESCRIPTION - ONSET: ONSET: ON-GOING

## 2019-09-11 ASSESSMENT — PAIN DESCRIPTION - LOCATION: LOCATION: HIP

## 2019-09-11 ASSESSMENT — PAIN DESCRIPTION - ORIENTATION: ORIENTATION: LEFT

## 2019-09-12 VITALS
OXYGEN SATURATION: 92 % | SYSTOLIC BLOOD PRESSURE: 121 MMHG | TEMPERATURE: 97.1 F | WEIGHT: 118.1 LBS | RESPIRATION RATE: 16 BRPM | HEART RATE: 92 BPM | BODY MASS INDEX: 18.98 KG/M2 | DIASTOLIC BLOOD PRESSURE: 65 MMHG | HEIGHT: 66 IN

## 2019-09-12 PROCEDURE — 6360000002 HC RX W HCPCS: Performed by: INTERNAL MEDICINE

## 2019-09-12 PROCEDURE — 6370000000 HC RX 637 (ALT 250 FOR IP): Performed by: INTERNAL MEDICINE

## 2019-09-12 RX ORDER — SENNA PLUS 8.6 MG/1
1 TABLET ORAL DAILY PRN
DISCHARGE
Start: 2019-09-12 | End: 2019-10-12

## 2019-09-12 RX ORDER — ACETAMINOPHEN 325 MG/1
650 TABLET ORAL EVERY 8 HOURS PRN
Qty: 120 TABLET | Refills: 0 | DISCHARGE
Start: 2019-09-12

## 2019-09-12 RX ADMIN — ENOXAPARIN SODIUM 40 MG: 40 INJECTION SUBCUTANEOUS at 08:58

## 2019-09-12 RX ADMIN — ACETAMINOPHEN 650 MG: 325 TABLET ORAL at 09:01

## 2019-09-12 ASSESSMENT — PAIN SCALES - GENERAL: PAINLEVEL_OUTOF10: 5

## 2019-09-12 NOTE — PROGRESS NOTES
09/10/19 1603   Restrictions/Precautions   Restrictions/Precautions Fall Risk;Weight Bearing   Required Braces or Orthoses? No   Lower Extremity Weight Bearing Restrictions   Left Lower Extremity Weight Bearing Toe Touch Weight Bearing   Partial Weight Bearing Percentage Or Pounds Foot down weight bearing L LE x 6 weeks   General   Chart Reviewed Yes   Family / Caregiver Present Yes   Pain Screening   Patient Currently in Pain No   Intervention List Patient able to continue with treatment   Pain Assessment   Pain Assessment 0-10   Pain Level 0   Vital Signs   Level of Consciousness 0   Oxygen Therapy   O2 Device None (Room air)   Orientation   Overall Orientation Status X   Transfers   Sit to Stand Contact guard assistance   Stand to sit Minimal Assistance   Bed to Chair Contact guard assistance   Ambulation 1   Surface level tile   Device Rolling Walker   Assistance Contact guard assistance   Quality of Gait Patient had to be reminded of TDWB   Distance 48'   Comments Patient in chair post gait  \" ploped \" in chair poor safety awareness   Conditions Requiring Skilled Therapeutic Intervention   Body structures, Functions, Activity limitations Decreased functional mobility ; Decreased ROM; Decreased strength;Decreased safe awareness;Decreased cognition;Decreased balance;Decreased endurance; Increased Pain   Activity Tolerance   Activity Tolerance Patient Tolerated treatment well   Safety Devices   Type of devices Left in chair;Chair alarm in place  (Family present)   Physical Therapy    Electronically signed by Seven Guo PTA on 9/10/2019 at 4:12 PM
24 hour orders verified
Patient seen By Dr. Migue Mock and fx  Is non surgical and patient to be toe touch weight bearing x 6 weeks. Patient and family would like referal sent to [facility] for placement for next 6 weeks for extra help. Will get in touch with care management and let them know. May need 3 night stay to be accepted and family ok with this will keep family updated with any new developments.
Physical Therapy    Facility/Department: [facility] SURG SERVICES  Initial Assessment    NAME: Lily Bettencourt  : 1932  MRN: [id number]    Date of Service: 2019    Discharge Recommendations:  Continue to assess pending progress, Patient would benefit from continued therapy after discharge        Assessment   Body structures, Functions, Activity limitations: Decreased functional mobility ; Decreased ROM; Decreased strength;Decreased safe awareness;Decreased cognition;Decreased balance;Decreased endurance; Increased Pain  Assessment: Pt. will benefit from PT during acute care stay. Anticipate pt. will benefit from PT after d/c from [facility], as she will need therapy and training on touch down WB LLE. Pt. may be limited on RW use due to dementia, but will attempt to train pt. with RW, starting with transfers and working toward amb. Treatment Diagnosis: impaired gait and mobility  Prognosis: Good  Decision Making: Low Complexity  PT Education: Goals;PT Role;Plan of Care;Transfer Training;Functional Mobility Training;Gait Training;General Safety;Precautions;Weight-bearing Education  Barriers to Learning: dementia  REQUIRES PT FOLLOW UP: Yes  Activity Tolerance  Activity Tolerance: Patient Tolerated treatment well;Patient limited by cognitive status  Activity Tolerance: some limitations with dementia       Patient Diagnosis(es): The primary encounter diagnosis was Closed displaced intertrochanteric fracture of left femur, initial encounter (Thierry Mccauley). Diagnoses of Urinary tract infection without hematuria, site unspecified and Leukocytosis, unspecified type were also pertinent to this visit. has no past medical history on file. has a past surgical history that includes hip surgery and eye surgery.     Restrictions  Restrictions/Precautions  Restrictions/Precautions: Fall Risk, Weight Bearing  Required Braces or Orthoses?: No  Lower Extremity Weight Bearing Restrictions  Left Lower Extremity Weight Bearing: Toe Touch
Physical Therapy  Marylee Bing  [id number]     09/11/19 1134   Subjective   Subjective PT states she is willing to walk    General Comment   Comments in bed    Bed Mobility   Supine to Sit Stand by assistance   Scooting Independent   Transfers   Sit to Stand Stand by assistance   Stand to sit Contact guard assistance   Bed to Chair Contact guard assistance   Ambulation   Ambulation? Yes   WB Status TDWB LLE x 6 weeks   Ambulation 1   Surface level tile   Device Rolling Walker   Assistance Contact guard assistance   Quality of Gait vc's for TTWB   Gait Deviations Slow Rosalia;Decreased step length;Decreased step height   Distance 50'   Comments slight confusion   Short term goals   Time Frame for Short term goals 2 wks   Short term goal 1 supine to sit indep    Short term goal 2 sit to stand indep with RW or holding to bedrails, TDWB LLE   Short term goal 3 bed to chair transfer TDWB LLE   Short term goal 4 amb. 21' with TDWB LLE CGA   Conditions Requiring Skilled Therapeutic Intervention   Body structures, Functions, Activity limitations Decreased functional mobility ; Decreased ROM; Decreased strength;Decreased safe awareness;Decreased cognition;Decreased balance;Decreased endurance; Increased Pain   Assessment PT make to chair   Activity Tolerance   Activity Tolerance Patient Tolerated treatment well;Patient limited by endurance   Electronically signed by Renato Fontenot PTA on 9/11/2019 at 11:38 AM
Report called to Aldair Novak RN at [facility].
Impression:   1. No acute cardiopulmonary process. Signed by Dr Garrett Mendoza on 9/6/2019 6:56 PM      XR HIP BILATERAL W AP PELVIS (2 VIEWS)   Final Result   1. Changes from bilateral hip arthroplasty. 2. No hardware complication or acute osseous abnormality. Signed by Dr Garrett Mendoza on 9/6/2019 6:52 PM        Micro:   Blood Culture, Routine   Date Value Ref Range Status   09/06/2019   Preliminary    No Growth to date. Any change in status will be called.   , No components found for: LABURINE  Patient Active Problem List    Diagnosis Date Noted    Fall 09/06/2019    Osteoporosis 10/22/2018    Low back pain 09/19/2017    Depression 09/19/2017    Dysuria 09/19/2017       Assessment/plan: Active Problems:    Fall  Resolved Problems:    * No resolved hospital problems.  *      Plan:     9/7/19  Admitted for fall, left hip fx, ortho consulted, wbc 20, but UA better than before, CXR -ve,    no fever     Kailey Edmondson MD  Hospitalist Service  9/8/2019  9:08 AM
obtained. Comparison:  8/1/2010 chest radiograph   Findings: The level inspiration is shallow and lung volumes diminished. The lungs are clear without definite infiltrates pleural effusions or  pneumothoraces. The heart is normal in size without heart failure. There is osteoporosis. There is no acute osseous abnormalities. [de-identified]                                                                                    Impression:       Impression:  1. No acute cardiopulmonary process. Signed by Dr Sommer Rubio on 9/6/2019 6:56 PM         Objective:   Vitals: /67   Pulse 96   Temp 98.4 °F (36.9 °C) (Temporal)   Resp 16   Ht 5' 6\" (1.676 m)   Wt 118 lb 1.6 oz (53.6 kg)   SpO2 92%   BMI 19.06 kg/m²   24HR INTAKE/OUTPUT:      Intake/Output Summary (Last 24 hours) at 9/10/2019 1412  Last data filed at 9/10/2019 1010  Gross per 24 hour   Intake 720 ml   Output 450 ml   Net 270 ml     General appearance: NAD, alert and cooperative with exam  HEENT: atraumatic, PERRLA, EOMI, anicteric, trachea midline  Lungs: no increased work of breathing, CTAB, no wheezing/rhonchi/rales  Heart: RRR, +s1/s2, no rub  Abdomen: soft, nt/nd, +BS, no rebound/gaurding  Extremities: no edema, no cyanosis   Neurologic: AAOx2, no focal deficits  Skin: no rashes  Psych: Normal affect      Assessment and Plan: Active Problems:    Fall - mechanical, pt/ot      Closed hip fracture, left, initial encounter ([facility] [address] - seen by ortho, non-operative at this time, continue supportive care and plan for SNF placement.         uti - s/p 4 doses of ceftriaxone, can d/c and monitor      Dementia - at baseline per family, no acute behavioral issues    Advance Directive: Full Code          Electronically signed by Melisa Sanders DO on 9/10/2019 at 2:12 PM

## 2019-09-12 NOTE — DISCHARGE SUMMARY
C4. There is anterolisthesis of  C7 on T1 related to degenerative facet changes. Prevertebral soft tissues are normal. There is no CT evidence of acute  fracture or subluxation. There is relative mild canal stenosis C5-C6 and C6/C7 related to  posterior ossified formation. Foraminal narrowing associated with  uncinate hypertrophy and facet arthropathy identified at C4-C5 C5-C6  and C6-C7. There is no CT evidence of posttraumatic disc herniation.     Impression:       1. No CT evidence of acute bony injury to the cervical spine. 2. Multilevel disc degeneration and spondylosis. Signed by Dr Hortensia Rizvi on 9/6/2019 7:41 PM     CT Head WO Contrast [[id number]] Resulted: 09/06/19 1926     Order Status: Completed Updated: 09/06/19 1928     Narrative:       CT HEAD WO CONTRAST 9/6/2019 5:30 PM  History: Headache status post head trauma/fall  Comparisons: None   Technique:  Radiation dose equals  mGy cm. AUTOMATED EXPOSURE CONTROL dose  reduction technique was implemented  CT evaluation of the head without intravenous contrast. 5 mm  transaxial images were obtained.   2-D sagittal and coronal  reconstruction images were generated. Findings: There is diffuse central and cortical atrophy. There are low-attenuation changes in the periventricular and  subcortical white matter compatible with moderate chronic ischemic  changes. There is no intra or extra-axial hemorrhage. There is no mass effect or midline shift. There is no hydrocephalus. Paranasal sinuses imaged in part are clear. Bone window imaging reveals no skull fracture or acute calvarial  abdomen on.           Impression:       Impression:   1.  No CT evidence of an acute intracranial process.                     Signed by Dr Hortensia Rizvi on 9/6/2019 7:26 PM     XR CHEST STANDARD (2 VW) [[id number]] Resulted: 09/06/19 1856     Order Status: Completed Specimen: Chest Updated: 09/06/19 1858     Narrative:       XR CHEST (2 VW) 9/6/2019 5:30 PM  [facility]

## 2019-09-13 ENCOUNTER — LAB REQUISITION (OUTPATIENT)
Dept: LAB | Facility: HOSPITAL | Age: 84
End: 2019-09-13

## 2019-09-13 DIAGNOSIS — Z00.00 ENCOUNTER FOR GENERAL ADULT MEDICAL EXAMINATION WITHOUT ABNORMAL FINDINGS: ICD-10-CM

## 2019-09-13 LAB
ALBUMIN SERPL-MCNC: 4.3 G/DL (ref 3.5–5.2)
ALP SERPL-CCNC: 62 U/L (ref 39–117)
ALT SERPL W P-5'-P-CCNC: 41 U/L (ref 1–33)
ANION GAP SERPL CALCULATED.3IONS-SCNC: 11 MMOL/L (ref 5–15)
ANISOCYTOSIS BLD QL: ABNORMAL
AST SERPL-CCNC: 44 U/L (ref 1–32)
BASOPHILS # BLD MANUAL: 0.1 10*3/MM3 (ref 0–0.2)
BASOPHILS NFR BLD AUTO: 1 % (ref 0–1.5)
BILIRUB CONJ SERPL-MCNC: <0.2 MG/DL (ref 0.2–0.3)
BILIRUB INDIRECT SERPL-MCNC: ABNORMAL MG/DL
BILIRUB SERPL-MCNC: 0.5 MG/DL (ref 0.2–1.2)
BUN BLD-MCNC: 16 MG/DL (ref 8–23)
BUN/CREAT SERPL: 23.5 (ref 7–25)
CALCIUM SPEC-SCNC: 9.2 MG/DL (ref 8.6–10.5)
CHLORIDE SERPL-SCNC: 96 MMOL/L (ref 98–107)
CHOLEST SERPL-MCNC: 220 MG/DL (ref 0–200)
CO2 SERPL-SCNC: 30 MMOL/L (ref 22–29)
CREAT BLD-MCNC: 0.68 MG/DL (ref 0.57–1)
DEPRECATED RDW RBC AUTO: 49.3 FL (ref 37–54)
EOSINOPHIL # BLD MANUAL: 0.5 10*3/MM3 (ref 0–0.4)
EOSINOPHIL NFR BLD MANUAL: 5 % (ref 0.3–6.2)
ERYTHROCYTE [DISTWIDTH] IN BLOOD BY AUTOMATED COUNT: 13.8 % (ref 12.3–15.4)
GFR SERPL CREATININE-BSD FRML MDRD: 82 ML/MIN/1.73
GLUCOSE BLD-MCNC: 113 MG/DL (ref 65–99)
HBA1C MFR BLD: 5.5 % (ref 4.8–5.6)
HCT VFR BLD AUTO: 38.7 % (ref 34–46.6)
HDLC SERPL-MCNC: 57 MG/DL (ref 40–60)
HGB BLD-MCNC: 12.6 G/DL (ref 12–15.9)
LDLC SERPL CALC-MCNC: 124 MG/DL (ref 0–100)
LDLC/HDLC SERPL: 2.18 {RATIO}
LYMPHOCYTES # BLD MANUAL: 2.11 10*3/MM3 (ref 0.7–3.1)
LYMPHOCYTES NFR BLD MANUAL: 21 % (ref 19.6–45.3)
LYMPHOCYTES NFR BLD MANUAL: 7 % (ref 5–12)
MACROCYTES BLD QL SMEAR: ABNORMAL
MCH RBC QN AUTO: 32.1 PG (ref 26.6–33)
MCHC RBC AUTO-ENTMCNC: 32.6 G/DL (ref 31.5–35.7)
MCV RBC AUTO: 98.5 FL (ref 79–97)
METAMYELOCYTES NFR BLD MANUAL: 1 % (ref 0–0)
MONOCYTES # BLD AUTO: 0.7 10*3/MM3 (ref 0.1–0.9)
NEUTROPHILS # BLD AUTO: 6.55 10*3/MM3 (ref 1.7–7)
NEUTROPHILS NFR BLD MANUAL: 60 % (ref 42.7–76)
NEUTS BAND NFR BLD MANUAL: 5 % (ref 0–5)
NEUTS VAC BLD QL SMEAR: ABNORMAL
PLAT MORPH BLD: NORMAL
PLATELET # BLD AUTO: 162 10*3/MM3 (ref 140–450)
PMV BLD AUTO: 10.8 FL (ref 6–12)
POIKILOCYTOSIS BLD QL SMEAR: ABNORMAL
POTASSIUM BLD-SCNC: 4.6 MMOL/L (ref 3.5–5.2)
PREALB SERPL-MCNC: 22.5 MG/DL (ref 20–40)
PROT SERPL-MCNC: 7.1 G/DL (ref 6–8.5)
RBC # BLD AUTO: 3.93 10*6/MM3 (ref 3.77–5.28)
SODIUM BLD-SCNC: 137 MMOL/L (ref 136–145)
TRIGL SERPL-MCNC: 193 MG/DL (ref 0–150)
TSH SERPL DL<=0.05 MIU/L-ACNC: 2.52 UIU/ML (ref 0.27–4.2)
VIT B12 BLD-MCNC: 432 PG/ML (ref 211–946)
VLDLC SERPL-MCNC: 38.6 MG/DL
WBC NRBC COR # BLD: 10.07 10*3/MM3 (ref 3.4–10.8)

## 2019-09-13 PROCEDURE — 36415 COLL VENOUS BLD VENIPUNCTURE: CPT | Performed by: NURSE PRACTITIONER

## 2019-09-13 PROCEDURE — 80076 HEPATIC FUNCTION PANEL: CPT | Performed by: NURSE PRACTITIONER

## 2019-09-13 PROCEDURE — 84134 ASSAY OF PREALBUMIN: CPT | Performed by: NURSE PRACTITIONER

## 2019-09-13 PROCEDURE — 84443 ASSAY THYROID STIM HORMONE: CPT | Performed by: NURSE PRACTITIONER

## 2019-09-13 PROCEDURE — 82607 VITAMIN B-12: CPT | Performed by: NURSE PRACTITIONER

## 2019-09-13 PROCEDURE — 80048 BASIC METABOLIC PNL TOTAL CA: CPT | Performed by: NURSE PRACTITIONER

## 2019-09-13 PROCEDURE — 83036 HEMOGLOBIN GLYCOSYLATED A1C: CPT | Performed by: NURSE PRACTITIONER

## 2019-09-13 PROCEDURE — 85025 COMPLETE CBC W/AUTO DIFF WBC: CPT | Performed by: NURSE PRACTITIONER

## 2019-09-13 PROCEDURE — 80061 LIPID PANEL: CPT | Performed by: NURSE PRACTITIONER

## 2019-09-16 ENCOUNTER — TELEPHONE (OUTPATIENT)
Dept: INTERNAL MEDICINE | Age: 84
End: 2019-09-16

## 2019-09-26 ENCOUNTER — TELEPHONE (OUTPATIENT)
Dept: INTERNAL MEDICINE | Age: 84
End: 2019-09-26

## 2019-09-26 NOTE — TELEPHONE ENCOUNTER
[facility] (CR[facility]) [facility], [phone]    Per Aldair Novak, nursing staff at [facility], patient is doing well. Patient continues to participate in therapy. She may possibly go home next week.

## 2019-10-02 ENCOUNTER — TELEPHONE (OUTPATIENT)
Dept: INTERNAL MEDICINE | Age: 84
End: 2019-10-02
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
## 2019-10-08 ENCOUNTER — TELEPHONE (OUTPATIENT)
Dept: INTERNAL MEDICINE | Age: 84
End: 2019-10-08

## 2019-10-16 ENCOUNTER — LAB REQUISITION (OUTPATIENT)
Dept: LAB | Facility: HOSPITAL | Age: 84
End: 2019-10-16

## 2019-10-16 DIAGNOSIS — Z00.00 ENCOUNTER FOR GENERAL ADULT MEDICAL EXAMINATION WITHOUT ABNORMAL FINDINGS: ICD-10-CM

## 2019-10-16 LAB
BACTERIA UR QL AUTO: ABNORMAL /HPF
BILIRUB UR QL STRIP: NEGATIVE
CLARITY UR: CLEAR
COLOR UR: YELLOW
GLUCOSE UR STRIP-MCNC: NEGATIVE MG/DL
HGB UR QL STRIP.AUTO: NEGATIVE
HYALINE CASTS UR QL AUTO: ABNORMAL /LPF
KETONES UR QL STRIP: NEGATIVE
LEUKOCYTE ESTERASE UR QL STRIP.AUTO: ABNORMAL
NITRITE UR QL STRIP: NEGATIVE
PH UR STRIP.AUTO: 6.5 [PH] (ref 5–8)
PROT UR QL STRIP: NEGATIVE
RBC # UR: ABNORMAL /HPF
REF LAB TEST METHOD: ABNORMAL
SP GR UR STRIP: 1.02 (ref 1–1.03)
SQUAMOUS #/AREA URNS HPF: ABNORMAL /HPF
UROBILINOGEN UR QL STRIP: ABNORMAL
WBC UR QL AUTO: ABNORMAL /HPF

## 2019-10-16 PROCEDURE — 87086 URINE CULTURE/COLONY COUNT: CPT | Performed by: NURSE PRACTITIONER

## 2019-10-16 PROCEDURE — 81001 URINALYSIS AUTO W/SCOPE: CPT | Performed by: NURSE PRACTITIONER

## 2019-10-18 ENCOUNTER — TELEPHONE (OUTPATIENT)
Dept: INTERNAL MEDICINE | Age: 84
End: 2019-10-18

## 2019-10-18 LAB — BACTERIA SPEC AEROBE CULT: NORMAL

## 2019-10-21 ENCOUNTER — TELEPHONE (OUTPATIENT)
Dept: INTERNAL MEDICINE CLINIC | Age: 84
End: 2019-10-21

## 2019-10-22 ENCOUNTER — TELEPHONE (OUTPATIENT)
Dept: INTERNAL MEDICINE | Age: 84
End: 2019-10-22

## 2019-10-28 ENCOUNTER — TELEPHONE (OUTPATIENT)
Dept: INTERNAL MEDICINE CLINIC | Age: 84
End: 2019-10-28

## 2019-10-28 ENCOUNTER — TELEPHONE (OUTPATIENT)
Dept: INTERNAL MEDICINE | Age: 84
End: 2019-10-28

## 2019-10-29 ENCOUNTER — OFFICE VISIT (OUTPATIENT)
Dept: INTERNAL MEDICINE | Age: 84
End: 2019-10-29
Payer: MEDICARE

## 2019-10-29 VITALS
BODY MASS INDEX: 20.34 KG/M2 | WEIGHT: 126 LBS | DIASTOLIC BLOOD PRESSURE: 64 MMHG | SYSTOLIC BLOOD PRESSURE: 122 MMHG | HEART RATE: 80 BPM

## 2019-10-29 DIAGNOSIS — M54.50 LOW BACK PAIN WITHOUT SCIATICA, UNSPECIFIED BACK PAIN LATERALITY, UNSPECIFIED CHRONICITY: Primary | ICD-10-CM

## 2019-10-29 DIAGNOSIS — R30.0 DYSURIA: ICD-10-CM

## 2019-10-29 LAB
BACTERIA: ABNORMAL /HPF
BILIRUBIN URINE: NEGATIVE
BLOOD, URINE: NEGATIVE
CLARITY: ABNORMAL
COLOR: YELLOW
EPITHELIAL CELLS, UA: 1 /HPF (ref 0–5)
GLUCOSE URINE: NEGATIVE MG/DL
HYALINE CASTS: 1 /HPF (ref 0–8)
KETONES, URINE: NEGATIVE MG/DL
LEUKOCYTE ESTERASE, URINE: ABNORMAL
NITRITE, URINE: NEGATIVE
PH UA: 6.5 (ref 5–8)
PROTEIN UA: NEGATIVE MG/DL
RBC UA: 1 /HPF (ref 0–4)
SPECIFIC GRAVITY UA: 1.01 (ref 1–1.03)
URINE REFLEX TO CULTURE: YES
UROBILINOGEN, URINE: 0.2 E.U./DL
WBC UA: 48 /HPF (ref 0–5)

## 2019-10-29 PROCEDURE — 99214 OFFICE O/P EST MOD 30 MIN: CPT | Performed by: INTERNAL MEDICINE

## 2019-10-29 PROCEDURE — 4040F PNEUMOC VAC/ADMIN/RCVD: CPT | Performed by: INTERNAL MEDICINE

## 2019-10-29 PROCEDURE — G8420 CALC BMI NORM PARAMETERS: HCPCS | Performed by: INTERNAL MEDICINE

## 2019-10-29 PROCEDURE — 1036F TOBACCO NON-USER: CPT | Performed by: INTERNAL MEDICINE

## 2019-10-29 PROCEDURE — G8484 FLU IMMUNIZE NO ADMIN: HCPCS | Performed by: INTERNAL MEDICINE

## 2019-10-29 PROCEDURE — 1123F ACP DISCUSS/DSCN MKR DOCD: CPT | Performed by: INTERNAL MEDICINE

## 2019-10-29 PROCEDURE — G8427 DOCREV CUR MEDS BY ELIG CLIN: HCPCS | Performed by: INTERNAL MEDICINE

## 2019-10-29 PROCEDURE — 1090F PRES/ABSN URINE INCON ASSESS: CPT | Performed by: INTERNAL MEDICINE

## 2019-10-29 RX ORDER — NITROFURANTOIN 25; 75 MG/1; MG/1
100 CAPSULE ORAL 2 TIMES DAILY
Qty: 20 CAPSULE | Refills: 0 | Status: SHIPPED | OUTPATIENT
Start: 2019-10-29 | End: 2019-10-29 | Stop reason: SINTOL

## 2019-10-29 RX ORDER — SULFAMETHOXAZOLE AND TRIMETHOPRIM 800; 160 MG/1; MG/1
1 TABLET ORAL 2 TIMES DAILY
Qty: 28 TABLET | Refills: 0 | Status: SHIPPED | OUTPATIENT
Start: 2019-10-29 | End: 2019-11-12

## 2019-10-29 ASSESSMENT — ENCOUNTER SYMPTOMS
ABDOMINAL DISTENTION: 0
SHORTNESS OF BREATH: 0
ABDOMINAL PAIN: 0
NAUSEA: 0
SORE THROAT: 0
BACK PAIN: 0
VOMITING: 0
EYE DISCHARGE: 0
TROUBLE SWALLOWING: 0
BLOOD IN STOOL: 0
WHEEZING: 0
SINUS PRESSURE: 0
EYE ITCHING: 0

## 2019-10-31 ENCOUNTER — CARE COORDINATION (OUTPATIENT)
Dept: CASE MANAGEMENT | Age: 84
End: 2019-10-31

## 2019-10-31 DIAGNOSIS — S72.002A CLOSED HIP FRACTURE, LEFT, INITIAL ENCOUNTER (HCC): Primary | ICD-10-CM

## 2019-10-31 LAB
ORGANISM: ABNORMAL
URINE CULTURE, ROUTINE: ABNORMAL
URINE CULTURE, ROUTINE: ABNORMAL

## 2019-10-31 PROCEDURE — 1111F DSCHRG MED/CURRENT MED MERGE: CPT | Performed by: INTERNAL MEDICINE

## 2019-11-04 LAB
CALCIUM SERPL-MCNC: 9.5 MG/DL (ref 8.8–10.2)
VITAMIN D 25-HYDROXY: 98 NG/ML

## 2019-11-18 ENCOUNTER — HOSPITAL ENCOUNTER (OUTPATIENT)
Dept: INFUSION THERAPY | Age: 84
Setting detail: INFUSION SERIES
Discharge: HOME | End: 2019-11-18
Payer: MEDICARE

## 2019-11-18 VITALS
HEART RATE: 81 BPM | RESPIRATION RATE: 18 BRPM | SYSTOLIC BLOOD PRESSURE: 158 MMHG | OXYGEN SATURATION: 97 % | TEMPERATURE: 98 F | DIASTOLIC BLOOD PRESSURE: 86 MMHG

## 2019-11-18 DIAGNOSIS — M81.0 OSTEOPOROSIS, UNSPECIFIED OSTEOPOROSIS TYPE, UNSPECIFIED PATHOLOGICAL FRACTURE PRESENCE: Primary | ICD-10-CM

## 2019-11-18 PROCEDURE — 96372 THER/PROPH/DIAG INJ SC/IM: CPT

## 2019-11-18 PROCEDURE — 6360000002 HC RX W HCPCS: Performed by: NURSE PRACTITIONER

## 2019-11-18 RX ADMIN — DENOSUMAB 60 MG: 60 INJECTION SUBCUTANEOUS at 15:30

## 2020-01-07 DIAGNOSIS — R30.0 DYSURIA: ICD-10-CM

## 2020-01-07 DIAGNOSIS — F32.A DEPRESSION, UNSPECIFIED DEPRESSION TYPE: ICD-10-CM

## 2020-01-07 DIAGNOSIS — I67.81 ACUTE CEREBROVASCULAR INSUFFICIENCY: ICD-10-CM

## 2020-01-07 DIAGNOSIS — M81.0 OSTEOPOROSIS, UNSPECIFIED OSTEOPOROSIS TYPE, UNSPECIFIED PATHOLOGICAL FRACTURE PRESENCE: ICD-10-CM

## 2020-01-07 LAB
ALBUMIN SERPL-MCNC: 4.2 G/DL (ref 3.5–5.2)
ALP BLD-CCNC: 46 U/L (ref 35–104)
ALT SERPL-CCNC: 8 U/L (ref 5–33)
ANION GAP SERPL CALCULATED.3IONS-SCNC: 13 MMOL/L (ref 7–19)
AST SERPL-CCNC: 14 U/L (ref 5–32)
BACTERIA: ABNORMAL /HPF
BASOPHILS ABSOLUTE: 0.1 K/UL (ref 0–0.2)
BASOPHILS RELATIVE PERCENT: 1.3 % (ref 0–1)
BILIRUB SERPL-MCNC: 0.4 MG/DL (ref 0.2–1.2)
BILIRUBIN URINE: NEGATIVE
BLOOD, URINE: ABNORMAL
BUN BLDV-MCNC: 16 MG/DL (ref 8–23)
CALCIUM SERPL-MCNC: 9 MG/DL (ref 8.8–10.2)
CHLORIDE BLD-SCNC: 102 MMOL/L (ref 98–111)
CHOLESTEROL, TOTAL: 242 MG/DL (ref 160–199)
CLARITY: ABNORMAL
CO2: 27 MMOL/L (ref 22–29)
COLOR: YELLOW
CREAT SERPL-MCNC: 0.8 MG/DL (ref 0.5–0.9)
EOSINOPHILS ABSOLUTE: 0.1 K/UL (ref 0–0.6)
EOSINOPHILS RELATIVE PERCENT: 1.2 % (ref 0–5)
EPITHELIAL CELLS, UA: ABNORMAL /HPF
GFR NON-AFRICAN AMERICAN: >60
GLUCOSE BLD-MCNC: 102 MG/DL (ref 74–109)
GLUCOSE URINE: NEGATIVE MG/DL
HCT VFR BLD CALC: 45.1 % (ref 37–47)
HDLC SERPL-MCNC: 70 MG/DL (ref 65–121)
HEMOGLOBIN: 14 G/DL (ref 12–16)
IMMATURE GRANULOCYTES #: 0.1 K/UL
KETONES, URINE: NEGATIVE MG/DL
LDL CHOLESTEROL CALCULATED: 145 MG/DL
LEUKOCYTE ESTERASE, URINE: ABNORMAL
LYMPHOCYTES ABSOLUTE: 2.1 K/UL (ref 1.1–4.5)
LYMPHOCYTES RELATIVE PERCENT: 24.6 % (ref 20–40)
MCH RBC QN AUTO: 31.7 PG (ref 27–31)
MCHC RBC AUTO-ENTMCNC: 31 G/DL (ref 33–37)
MCV RBC AUTO: 102 FL (ref 81–99)
MONOCYTES ABSOLUTE: 0.9 K/UL (ref 0–0.9)
MONOCYTES RELATIVE PERCENT: 10.5 % (ref 0–10)
NEUTROPHILS ABSOLUTE: 5.3 K/UL (ref 1.5–7.5)
NEUTROPHILS RELATIVE PERCENT: 61.8 % (ref 50–65)
NITRITE, URINE: NEGATIVE
PDW BLD-RTO: 13.4 % (ref 11.5–14.5)
PH UA: 6 (ref 5–8)
PLATELET # BLD: 209 K/UL (ref 130–400)
PMV BLD AUTO: 9.7 FL (ref 9.4–12.3)
POTASSIUM SERPL-SCNC: 4.2 MMOL/L (ref 3.5–5)
PROTEIN UA: NEGATIVE MG/DL
RBC # BLD: 4.42 M/UL (ref 4.2–5.4)
RBC UA: ABNORMAL /HPF (ref 0–2)
SODIUM BLD-SCNC: 142 MMOL/L (ref 136–145)
SPECIFIC GRAVITY UA: 1.02 (ref 1–1.03)
T4 FREE: 1.22 NG/DL (ref 0.93–1.7)
TOTAL PROTEIN: 7.2 G/DL (ref 6.6–8.7)
TRIGL SERPL-MCNC: 137 MG/DL (ref 0–149)
TSH SERPL DL<=0.05 MIU/L-ACNC: 1.69 UIU/ML (ref 0.27–4.2)
UROBILINOGEN, URINE: 0.2 E.U./DL
WBC # BLD: 8.6 K/UL (ref 4.8–10.8)
WBC UA: ABNORMAL /HPF (ref 0–5)

## 2020-01-10 ENCOUNTER — OFFICE VISIT (OUTPATIENT)
Dept: INTERNAL MEDICINE | Age: 85
End: 2020-01-10
Payer: MEDICARE

## 2020-01-10 VITALS
DIASTOLIC BLOOD PRESSURE: 80 MMHG | BODY MASS INDEX: 20.01 KG/M2 | OXYGEN SATURATION: 97 % | WEIGHT: 124 LBS | TEMPERATURE: 99.5 F | HEART RATE: 78 BPM | SYSTOLIC BLOOD PRESSURE: 138 MMHG

## 2020-01-10 PROBLEM — I10 ESSENTIAL HYPERTENSION: Status: ACTIVE | Noted: 2020-01-10

## 2020-01-10 PROCEDURE — G8427 DOCREV CUR MEDS BY ELIG CLIN: HCPCS | Performed by: INTERNAL MEDICINE

## 2020-01-10 PROCEDURE — 1123F ACP DISCUSS/DSCN MKR DOCD: CPT | Performed by: INTERNAL MEDICINE

## 2020-01-10 PROCEDURE — 1036F TOBACCO NON-USER: CPT | Performed by: INTERNAL MEDICINE

## 2020-01-10 PROCEDURE — G8420 CALC BMI NORM PARAMETERS: HCPCS | Performed by: INTERNAL MEDICINE

## 2020-01-10 PROCEDURE — 4040F PNEUMOC VAC/ADMIN/RCVD: CPT | Performed by: INTERNAL MEDICINE

## 2020-01-10 PROCEDURE — G8484 FLU IMMUNIZE NO ADMIN: HCPCS | Performed by: INTERNAL MEDICINE

## 2020-01-10 PROCEDURE — 1090F PRES/ABSN URINE INCON ASSESS: CPT | Performed by: INTERNAL MEDICINE

## 2020-01-10 PROCEDURE — 99214 OFFICE O/P EST MOD 30 MIN: CPT | Performed by: INTERNAL MEDICINE

## 2020-01-10 RX ORDER — SULFAMETHOXAZOLE AND TRIMETHOPRIM 800; 160 MG/1; MG/1
1 TABLET ORAL 2 TIMES DAILY
Qty: 20 TABLET | Refills: 0 | Status: SHIPPED | OUTPATIENT
Start: 2020-01-10 | End: 2020-01-20

## 2020-01-10 ASSESSMENT — ENCOUNTER SYMPTOMS
EYE ITCHING: 0
VOMITING: 0
ABDOMINAL PAIN: 0
NAUSEA: 0
TROUBLE SWALLOWING: 0
SHORTNESS OF BREATH: 0
WHEEZING: 0
SINUS PRESSURE: 0
ABDOMINAL DISTENTION: 0
BLOOD IN STOOL: 0
BACK PAIN: 0
SORE THROAT: 0
EYE DISCHARGE: 0

## 2020-01-10 NOTE — PROGRESS NOTES
distention, abdominal pain, blood in stool, nausea and vomiting. Endocrine: Negative for cold intolerance, heat intolerance and polydipsia. Genitourinary: Positive for dysuria and frequency. Negative for flank pain, hematuria and urgency. Musculoskeletal: Negative for arthralgias, back pain and joint swelling. Skin: Negative for rash and wound. Allergic/Immunologic: Negative for environmental allergies and food allergies. Neurological: Negative for dizziness, tremors, syncope, weakness, numbness and headaches. Hematological: Negative for adenopathy. Psychiatric/Behavioral: Negative for agitation and hallucinations. The patient is not nervous/anxious. Objective:      /80   Pulse 78   Temp 99.5 °F (37.5 °C)   Wt 124 lb (56.2 kg)   SpO2 97%   BMI 20.01 kg/m²    Physical Exam  Constitutional:       General: She is not in acute distress. Appearance: She is well-developed. She is not diaphoretic. HENT:      Head: Normocephalic and atraumatic. Right Ear: External ear normal.      Left Ear: External ear normal.      Nose: Nose normal.      Mouth/Throat:      Pharynx: No oropharyngeal exudate. Eyes:      General: No scleral icterus. Right eye: No discharge. Left eye: No discharge. Conjunctiva/sclera: Conjunctivae normal.      Pupils: Pupils are equal, round, and reactive to light. Neck:      Musculoskeletal: Normal range of motion and neck supple. Thyroid: No thyromegaly. Vascular: No JVD. Trachea: No tracheal deviation. Cardiovascular:      Rate and Rhythm: Normal rate and regular rhythm. Heart sounds: Normal heart sounds. No murmur. No friction rub. No gallop. Pulmonary:      Effort: Pulmonary effort is normal. No respiratory distress. Breath sounds: Normal breath sounds. No wheezing or rales. Abdominal:      General: Bowel sounds are normal. There is no distension. Palpations: Abdomen is soft. There is no mass. Tenderness: There is no tenderness. There is no guarding or rebound. Musculoskeletal: Normal range of motion. General: No tenderness or deformity. Lymphadenopathy:      Cervical: No cervical adenopathy. Skin:     General: Skin is warm and dry. Coloration: Skin is not pale. Findings: No erythema or rash. Neurological:      Mental Status: She is alert. She is disoriented. Cranial Nerves: No cranial nerve deficit. Motor: No abnormal muscle tone. Coordination: Coordination normal.      Deep Tendon Reflexes: Reflexes are normal and symmetric. Reflexes normal.   Psychiatric:         Behavior: Behavior normal.         Thought Content: Thought content normal.         Judgment: Judgment normal.          Assessment:      Diagnosis Orders   1. Osteoporosis, unspecified osteoporosis type, unspecified pathological fracture presence     2. Depression, unspecified depression type     3. Dysuria     4. Essential hypertension              Plan:     Osteoporosis. She continues to do well and remembers to take her Prolia injections. She is not having any toleration problems with the medication and will continue the same. Chronic depression which is an ongoing issue. She stays nervous and anxious a lot of the time but is not on any medication at this time nor does the daughters think that she needs it. Chronic dysuria with a urinary tract infection again once today. I am going to start her on Bactrim DS twice daily for the next 10 days and then will repeat a urinalysis upon completion of the antibiotic. Essential hypertension. I think she is got a lot of white coat syndrome because when I check her blood pressure again it is down to 130/78.   I talked to the daughters about watching her pressure I do not want to put her on blood pressure medication and bottom her out since she stays at home alone by herself a good bit of the time and is confused a good bit of the time so we will

## 2020-01-24 DIAGNOSIS — R30.0 DYSURIA: ICD-10-CM

## 2020-01-24 LAB
BILIRUBIN URINE: NEGATIVE
BLOOD, URINE: NEGATIVE
CLARITY: CLEAR
COLOR: YELLOW
GLUCOSE URINE: NEGATIVE MG/DL
KETONES, URINE: NEGATIVE MG/DL
LEUKOCYTE ESTERASE, URINE: NEGATIVE
NITRITE, URINE: NEGATIVE
PH UA: 5 (ref 5–8)
PROTEIN UA: NEGATIVE MG/DL
SPECIFIC GRAVITY UA: 1.02 (ref 1–1.03)
UROBILINOGEN, URINE: 0.2 E.U./DL

## 2020-03-23 ENCOUNTER — TELEPHONE (OUTPATIENT)
Dept: INTERNAL MEDICINE | Age: 85
End: 2020-03-23

## 2020-03-23 RX ORDER — SULFAMETHOXAZOLE AND TRIMETHOPRIM 800; 160 MG/1; MG/1
1 TABLET ORAL 2 TIMES DAILY
Qty: 20 TABLET | Refills: 0 | Status: SHIPPED | OUTPATIENT
Start: 2020-03-23 | End: 2020-04-02

## 2020-03-23 NOTE — TELEPHONE ENCOUNTER
Regan Solis states pt may have a kidney infection and would like to get her in to get tested.  Can an order be put in?

## 2020-05-26 ENCOUNTER — TELEPHONE (OUTPATIENT)
Dept: INTERNAL MEDICINE | Age: 85
End: 2020-05-26

## 2020-05-26 RX ORDER — SULFAMETHOXAZOLE AND TRIMETHOPRIM 800; 160 MG/1; MG/1
1 TABLET ORAL 2 TIMES DAILY
Qty: 10 TABLET | Refills: 0 | Status: SHIPPED | OUTPATIENT
Start: 2020-05-26 | End: 2020-06-05 | Stop reason: SDUPTHER

## 2020-05-26 NOTE — TELEPHONE ENCOUNTER
Daughter called and stated that patient has another UTI, and would to get a script for Bactrim sent in.

## 2020-05-27 LAB
CALCIUM SERPL-MCNC: 9.6 MG/DL (ref 8.8–10.2)
VITAMIN D 25-HYDROXY: >100 NG/ML

## 2020-06-05 ENCOUNTER — TELEPHONE (OUTPATIENT)
Dept: INTERNAL MEDICINE | Age: 85
End: 2020-06-05

## 2020-06-05 DIAGNOSIS — R30.0 DYSURIA: ICD-10-CM

## 2020-06-05 LAB
BACTERIA: NEGATIVE /HPF
BILIRUBIN URINE: NEGATIVE
BLOOD, URINE: NEGATIVE
CLARITY: ABNORMAL
COLOR: YELLOW
EPITHELIAL CELLS, UA: 1 /HPF (ref 0–5)
GLUCOSE URINE: NEGATIVE MG/DL
HYALINE CASTS: 4 /HPF (ref 0–8)
KETONES, URINE: NEGATIVE MG/DL
LEUKOCYTE ESTERASE, URINE: ABNORMAL
NITRITE, URINE: NEGATIVE
PH UA: 5 (ref 5–8)
PROTEIN UA: NEGATIVE MG/DL
RBC UA: 1 /HPF (ref 0–4)
SPECIFIC GRAVITY UA: 1.01 (ref 1–1.03)
UROBILINOGEN, URINE: 0.2 E.U./DL
WBC UA: 99 /HPF (ref 0–5)
YEAST: ABNORMAL /HPF

## 2020-06-05 RX ORDER — SULFAMETHOXAZOLE AND TRIMETHOPRIM 800; 160 MG/1; MG/1
1 TABLET ORAL 2 TIMES DAILY
Qty: 20 TABLET | Refills: 0 | Status: SHIPPED | OUTPATIENT
Start: 2020-06-05 | End: 2020-06-15

## 2020-06-05 NOTE — TELEPHONE ENCOUNTER
----- Message from Kendra Ohara MD sent at 6/5/2020  9:31 AM CDT -----  Send her Bactrim DS twice daily for 10 days

## 2020-06-07 LAB — URINE CULTURE, ROUTINE: NORMAL

## 2020-06-08 PROBLEM — M81.0 AGE-RELATED OSTEOPOROSIS WITHOUT CURRENT PATHOLOGICAL FRACTURE: Status: ACTIVE | Noted: 2020-06-08

## 2020-06-15 ENCOUNTER — TELEPHONE (OUTPATIENT)
Dept: INTERNAL MEDICINE | Age: 85
End: 2020-06-15

## 2020-06-15 NOTE — TELEPHONE ENCOUNTER
[place] daughter requests that Dr. Tam Laser nurse  return their call. The best time to reach her is Anytime. Thank you.

## 2020-06-19 ENCOUNTER — OFFICE VISIT (OUTPATIENT)
Dept: INTERNAL MEDICINE | Age: 85
End: 2020-06-19
Payer: MEDICARE

## 2020-06-19 VITALS
SYSTOLIC BLOOD PRESSURE: 110 MMHG | WEIGHT: 128 LBS | BODY MASS INDEX: 20.66 KG/M2 | HEART RATE: 85 BPM | OXYGEN SATURATION: 96 % | DIASTOLIC BLOOD PRESSURE: 62 MMHG

## 2020-06-19 PROBLEM — R53.1 WEAKNESS: Status: ACTIVE | Noted: 2020-06-19

## 2020-06-19 LAB
APPEARANCE FLUID: ABNORMAL
BILIRUBIN, POC: NEGATIVE
BLOOD URINE, POC: NEGATIVE
CLARITY, POC: ABNORMAL
COLOR, POC: YELLOW
GLUCOSE URINE, POC: ABNORMAL
KETONES, POC: NEGATIVE
LEUKOCYTE EST, POC: ABNORMAL
NITRITE, POC: POSITIVE
PH, POC: 6
PROTEIN, POC: NEGATIVE
SPECIFIC GRAVITY, POC: 1.01
UROBILINOGEN, POC: 1

## 2020-06-19 PROCEDURE — 1036F TOBACCO NON-USER: CPT | Performed by: INTERNAL MEDICINE

## 2020-06-19 PROCEDURE — G8427 DOCREV CUR MEDS BY ELIG CLIN: HCPCS | Performed by: INTERNAL MEDICINE

## 2020-06-19 PROCEDURE — 81002 URINALYSIS NONAUTO W/O SCOPE: CPT | Performed by: INTERNAL MEDICINE

## 2020-06-19 PROCEDURE — 4040F PNEUMOC VAC/ADMIN/RCVD: CPT | Performed by: INTERNAL MEDICINE

## 2020-06-19 PROCEDURE — 1090F PRES/ABSN URINE INCON ASSESS: CPT | Performed by: INTERNAL MEDICINE

## 2020-06-19 PROCEDURE — 1123F ACP DISCUSS/DSCN MKR DOCD: CPT | Performed by: INTERNAL MEDICINE

## 2020-06-19 PROCEDURE — 99214 OFFICE O/P EST MOD 30 MIN: CPT | Performed by: INTERNAL MEDICINE

## 2020-06-19 PROCEDURE — G8420 CALC BMI NORM PARAMETERS: HCPCS | Performed by: INTERNAL MEDICINE

## 2020-06-19 RX ORDER — NITROFURANTOIN 25; 75 MG/1; MG/1
100 CAPSULE ORAL DAILY
Qty: 90 CAPSULE | Refills: 0 | Status: SHIPPED | OUTPATIENT
Start: 2020-06-19 | End: 2020-09-17

## 2020-06-19 ASSESSMENT — ENCOUNTER SYMPTOMS
EYE DISCHARGE: 0
VOMITING: 0
SINUS PRESSURE: 0
ABDOMINAL PAIN: 0
TROUBLE SWALLOWING: 0
SORE THROAT: 0
ABDOMINAL DISTENTION: 0
WHEEZING: 0
NAUSEA: 0
SHORTNESS OF BREATH: 0
EYE ITCHING: 0
BLOOD IN STOOL: 0
BACK PAIN: 0

## 2020-06-19 NOTE — PROGRESS NOTES
[facility]  [address] [address]  [address] [address]  Dept: [phone]  Dept Fax: [phone]: [phone]      Visit Date: 6/19/2020    Magali Rea a 80 y.o. female who presents today for:  Chief Complaint   Patient presents with    Urinary Tract Infection     follow up after treatment    Fatigue         HPI:     [place] is in for problem visit. She is got persistent dysuria and weakness and they have treated with 2 different urinary tract infections in the last 3 weeks and she is got residual infection today. She cannot seem to get over it and she is very weak and very weak becoming very debilitated and her daughters are concerned. She continues to live alone and has 1 daughter who lives next door to her. No past medical history on file. Past Surgical History:   Procedure Laterality Date    EYE SURGERY      HIP SURGERY         No family history on file. Social History     Tobacco Use    Smoking status: Never Smoker    Smokeless tobacco: Never Used   Substance Use Topics    Alcohol use: No      Current Outpatient Medications   Medication Sig Dispense Refill    nitrofurantoin, macrocrystal-monohydrate, (MACROBID) 100 MG capsule Take 1 capsule by mouth daily 90 capsule 0    acetaminophen (TYLENOL) 325 MG tablet Take 2 tablets by mouth every 8 hours as needed for Pain (For temp greater than 100.5 F (38 C)) 120 tablet 0    denosumab (PROLIA) 60 MG/ML SOLN SC injection Inject 60 mg into the skin once       No current facility-administered medications for this visit. Allergies   Allergen Reactions    Morphine Other (See Comments)    Penicillins Nausea Only         Subjective:     Review of Systems   Constitutional: Negative for activity change, appetite change, fatigue and fever. HENT: Negative for congestion, hearing loss, sinus pressure, sore throat and trouble swallowing.     Eyes: Negative for discharge and

## 2020-06-23 ENCOUNTER — HOSPITAL ENCOUNTER (OUTPATIENT)
Dept: INFUSION THERAPY | Age: 85
Setting detail: INFUSION SERIES
Discharge: HOME | End: 2020-06-23
Payer: MEDICARE

## 2020-06-23 DIAGNOSIS — M81.0 AGE-RELATED OSTEOPOROSIS WITHOUT CURRENT PATHOLOGICAL FRACTURE: Primary | ICD-10-CM

## 2020-06-23 PROCEDURE — 6360000002 HC RX W HCPCS: Performed by: NURSE PRACTITIONER

## 2020-06-23 PROCEDURE — 96372 THER/PROPH/DIAG INJ SC/IM: CPT

## 2020-06-23 RX ADMIN — DENOSUMAB 60 MG: 60 INJECTION SUBCUTANEOUS at 13:48

## 2020-07-23 ENCOUNTER — OFFICE VISIT (OUTPATIENT)
Dept: INTERNAL MEDICINE | Age: 85
End: 2020-07-23
Payer: MEDICARE

## 2020-07-23 VITALS — SYSTOLIC BLOOD PRESSURE: 138 MMHG | HEART RATE: 90 BPM | DIASTOLIC BLOOD PRESSURE: 76 MMHG

## 2020-07-23 DIAGNOSIS — R30.0 DYSURIA: ICD-10-CM

## 2020-07-23 PROCEDURE — 1090F PRES/ABSN URINE INCON ASSESS: CPT | Performed by: INTERNAL MEDICINE

## 2020-07-23 PROCEDURE — 99214 OFFICE O/P EST MOD 30 MIN: CPT | Performed by: INTERNAL MEDICINE

## 2020-07-23 PROCEDURE — G8420 CALC BMI NORM PARAMETERS: HCPCS | Performed by: INTERNAL MEDICINE

## 2020-07-23 PROCEDURE — G8427 DOCREV CUR MEDS BY ELIG CLIN: HCPCS | Performed by: INTERNAL MEDICINE

## 2020-07-23 PROCEDURE — 1123F ACP DISCUSS/DSCN MKR DOCD: CPT | Performed by: INTERNAL MEDICINE

## 2020-07-23 PROCEDURE — 4040F PNEUMOC VAC/ADMIN/RCVD: CPT | Performed by: INTERNAL MEDICINE

## 2020-07-23 PROCEDURE — 1036F TOBACCO NON-USER: CPT | Performed by: INTERNAL MEDICINE

## 2020-07-23 ASSESSMENT — ENCOUNTER SYMPTOMS
VOMITING: 0
SORE THROAT: 0
ABDOMINAL PAIN: 0
ABDOMINAL DISTENTION: 0
BACK PAIN: 0
NAUSEA: 0
SHORTNESS OF BREATH: 0
WHEEZING: 0
EYE DISCHARGE: 0
SINUS PRESSURE: 0
TROUBLE SWALLOWING: 0
BLOOD IN STOOL: 0
EYE ITCHING: 0

## 2020-07-23 NOTE — PROGRESS NOTES
[facility]  [address] [address]  [address] [address]  Dept: [phone]  Dept Fax: [phone]: [phone]      Visit Date: 7/23/2020    Isaac Griffith a 80 y.o. female who presents today for:  Chief Complaint   Patient presents with    3 Month Follow-Up         HPI:     [place] is in for 3-month follow-up visit. We saw her back last time and she was having recurrent infections of the urinary tract and we put her on suppressive Macrodantin dose of 50 mg a day for 90 days. We brought her back today. She has had no further problems or symptoms and urine is pending. No past medical history on file. Past Surgical History:   Procedure Laterality Date    EYE SURGERY      HIP SURGERY         No family history on file. Social History     Tobacco Use    Smoking status: Never Smoker    Smokeless tobacco: Never Used   Substance Use Topics    Alcohol use: No      Current Outpatient Medications   Medication Sig Dispense Refill    nitrofurantoin, macrocrystal-monohydrate, (MACROBID) 100 MG capsule Take 1 capsule by mouth daily 90 capsule 0    acetaminophen (TYLENOL) 325 MG tablet Take 2 tablets by mouth every 8 hours as needed for Pain (For temp greater than 100.5 F (38 C)) 120 tablet 0    denosumab (PROLIA) 60 MG/ML SOLN SC injection Inject 60 mg into the skin once       No current facility-administered medications for this visit. Allergies   Allergen Reactions    Morphine Other (See Comments)    Penicillins Nausea Only         Subjective:     Review of Systems   Constitutional: Negative for activity change, appetite change, fatigue and fever. HENT: Negative for congestion, hearing loss, sinus pressure, sore throat and trouble swallowing. Eyes: Negative for discharge and itching. Respiratory: Negative for shortness of breath and wheezing. Cardiovascular: Negative for chest pain, palpitations and leg swelling. Gastrointestinal: Negative for abdominal distention, abdominal pain, blood in stool, nausea and vomiting. Endocrine: Negative for cold intolerance, heat intolerance and polydipsia. Genitourinary: Negative for flank pain, frequency, hematuria and urgency. Musculoskeletal: Negative for arthralgias, back pain and joint swelling. Skin: Negative for rash and wound. Allergic/Immunologic: Negative for environmental allergies and food allergies. Neurological: Negative for dizziness, tremors, syncope, weakness, numbness and headaches. Hematological: Negative for adenopathy. Psychiatric/Behavioral: Positive for confusion. Negative for agitation and hallucinations. The patient is not nervous/anxious. Objective:      BP (!) 160/90   Pulse 90    Physical Exam  Constitutional:       General: She is not in acute distress. Appearance: She is well-developed. She is not diaphoretic. HENT:      Head: Normocephalic and atraumatic. Right Ear: External ear normal.      Left Ear: External ear normal.      Nose: Nose normal.      Mouth/Throat:      Pharynx: No oropharyngeal exudate. Eyes:      General: No scleral icterus. Right eye: No discharge. Left eye: No discharge. Conjunctiva/sclera: Conjunctivae normal.      Pupils: Pupils are equal, round, and reactive to light. Neck:      Musculoskeletal: Normal range of motion and neck supple. Thyroid: No thyromegaly. Vascular: No JVD. Trachea: No tracheal deviation. Cardiovascular:      Rate and Rhythm: Normal rate and regular rhythm. Heart sounds: Normal heart sounds. No murmur. No friction rub. No gallop. Pulmonary:      Effort: Pulmonary effort is normal. No respiratory distress. Breath sounds: Normal breath sounds. No wheezing or rales. Abdominal:      General: Bowel sounds are normal. There is no distension. Palpations: Abdomen is soft. There is no mass. Tenderness:  There is no abdominal Delphine Patel MD on 7/23/2020 at 4:10 PM

## 2020-09-17 ENCOUNTER — TELEPHONE (OUTPATIENT)
Dept: INTERNAL MEDICINE | Age: 85
End: 2020-09-17

## 2020-09-17 NOTE — TELEPHONE ENCOUNTER
Daughter called to reschedule appointment for 10/22 since Dr. Evans Bring will be out of office. She would like to talk to one of the nurses before she reschedules.

## 2020-09-25 ENCOUNTER — TELEPHONE (OUTPATIENT)
Dept: INTERNAL MEDICINE | Age: 85
End: 2020-09-25

## 2020-09-25 NOTE — TELEPHONE ENCOUNTER
Maria Esther Beauchamp daughtercalled requesting urinalysis before appt.  Please call Ozzy Dunawayer to advise

## 2020-10-01 ENCOUNTER — TELEPHONE (OUTPATIENT)
Dept: INTERNAL MEDICINE | Age: 85
End: 2020-10-01

## 2020-10-01 DIAGNOSIS — R30.0 DYSURIA: ICD-10-CM

## 2020-10-01 LAB
BACTERIA: NEGATIVE /HPF
BILIRUBIN URINE: NEGATIVE
BLOOD, URINE: NEGATIVE
CLARITY: CLEAR
COLOR: YELLOW
CRYSTALS, UA: ABNORMAL /HPF
EPITHELIAL CELLS, UA: 2 /HPF (ref 0–5)
GLUCOSE URINE: NEGATIVE MG/DL
HYALINE CASTS: 0 /HPF (ref 0–8)
KETONES, URINE: NEGATIVE MG/DL
LEUKOCYTE ESTERASE, URINE: ABNORMAL
NITRITE, URINE: NEGATIVE
PH UA: 5.5 (ref 5–8)
PROTEIN UA: NEGATIVE MG/DL
RBC UA: 1 /HPF (ref 0–4)
SPECIFIC GRAVITY UA: 1.01 (ref 1–1.03)
UROBILINOGEN, URINE: 0.2 E.U./DL
WBC UA: 17 /HPF (ref 0–5)

## 2020-10-01 RX ORDER — NITROFURANTOIN 25; 75 MG/1; MG/1
100 CAPSULE ORAL 2 TIMES DAILY
Qty: 20 CAPSULE | Refills: 0 | Status: SHIPPED | OUTPATIENT
Start: 2020-10-01 | End: 2020-10-06 | Stop reason: CLARIF

## 2020-10-01 NOTE — TELEPHONE ENCOUNTER
Daughter says mother is acting like shes getting a bad UTI even though urine looks not to bad. Will u please go ahead and start a antibiotic while waiting for culure.

## 2020-10-01 NOTE — TELEPHONE ENCOUNTER
----- Message from Court Mcintyre MD sent at 10/1/2020 10:07 AM CDT -----  Waiting on culture report

## 2020-10-03 LAB — URINE CULTURE, ROUTINE: NORMAL

## 2020-10-06 ENCOUNTER — HOSPITAL ENCOUNTER (OUTPATIENT)
Dept: CT IMAGING | Age: 85
Discharge: HOME | End: 2020-10-06
Payer: MEDICARE

## 2020-10-06 ENCOUNTER — OFFICE VISIT (OUTPATIENT)
Dept: INTERNAL MEDICINE | Age: 85
End: 2020-10-06
Payer: MEDICARE

## 2020-10-06 VITALS
SYSTOLIC BLOOD PRESSURE: 134 MMHG | OXYGEN SATURATION: 98 % | DIASTOLIC BLOOD PRESSURE: 74 MMHG | HEART RATE: 87 BPM | BODY MASS INDEX: 21.49 KG/M2 | WEIGHT: 129 LBS | HEIGHT: 65 IN

## 2020-10-06 PROCEDURE — 70450 CT HEAD/BRAIN W/O DYE: CPT

## 2020-10-06 PROCEDURE — 1036F TOBACCO NON-USER: CPT | Performed by: INTERNAL MEDICINE

## 2020-10-06 PROCEDURE — 4040F PNEUMOC VAC/ADMIN/RCVD: CPT | Performed by: INTERNAL MEDICINE

## 2020-10-06 PROCEDURE — G8420 CALC BMI NORM PARAMETERS: HCPCS | Performed by: INTERNAL MEDICINE

## 2020-10-06 PROCEDURE — 1090F PRES/ABSN URINE INCON ASSESS: CPT | Performed by: INTERNAL MEDICINE

## 2020-10-06 PROCEDURE — G8484 FLU IMMUNIZE NO ADMIN: HCPCS | Performed by: INTERNAL MEDICINE

## 2020-10-06 PROCEDURE — 1123F ACP DISCUSS/DSCN MKR DOCD: CPT | Performed by: INTERNAL MEDICINE

## 2020-10-06 PROCEDURE — 99214 OFFICE O/P EST MOD 30 MIN: CPT | Performed by: INTERNAL MEDICINE

## 2020-10-06 PROCEDURE — G8427 DOCREV CUR MEDS BY ELIG CLIN: HCPCS | Performed by: INTERNAL MEDICINE

## 2020-10-06 RX ORDER — QUETIAPINE FUMARATE 25 MG/1
25 TABLET, FILM COATED ORAL NIGHTLY
Qty: 30 TABLET | Refills: 2 | Status: SHIPPED | OUTPATIENT
Start: 2020-10-06 | End: 2020-10-26

## 2020-10-06 ASSESSMENT — ENCOUNTER SYMPTOMS
BLOOD IN STOOL: 0
BACK PAIN: 0
ABDOMINAL DISTENTION: 0
SORE THROAT: 0
EYE DISCHARGE: 0
ABDOMINAL PAIN: 0
VOMITING: 0
NAUSEA: 0
EYE ITCHING: 0
TROUBLE SWALLOWING: 0
SINUS PRESSURE: 0
SHORTNESS OF BREATH: 0
WHEEZING: 0

## 2020-10-06 NOTE — PROGRESS NOTES
[facility]  [address] [address]  [address] [address]  Dept: [phone]  Dept Fax: [phone]: [phone]      Visit Date: 10/6/2020    Terrell Rene a 80 y.o. female who presents today for:  Chief Complaint   Patient presents with    Follow-up     memory issue worse         HPI:     [place] is in for problem visit. She is 80years old and over the last few days has been having increased behavioral issues and memory issues. She has known small vessel right cerebrovascular disease which was read as moderate to severe a year ago. She fell approximately 6 weeks ago and hit her head but did not tell us and did not get evaluated in the ER. No past medical history on file. Past Surgical History:   Procedure Laterality Date    EYE SURGERY      HIP SURGERY         No family history on file. Social History     Tobacco Use    Smoking status: Never Smoker    Smokeless tobacco: Never Used   Substance Use Topics    Alcohol use: No      Current Outpatient Medications   Medication Sig Dispense Refill    acetaminophen (TYLENOL) 325 MG tablet Take 2 tablets by mouth every 8 hours as needed for Pain (For temp greater than 100.5 F (38 C)) 120 tablet 0    denosumab (PROLIA) 60 MG/ML SOLN SC injection Inject 60 mg into the skin once       No current facility-administered medications for this visit. Allergies   Allergen Reactions    Morphine Other (See Comments)    Penicillins Nausea Only         Subjective:     Review of Systems   Constitutional: Negative for activity change, appetite change, fatigue and fever. HENT: Negative for congestion, hearing loss, sinus pressure, sore throat and trouble swallowing. Eyes: Negative for discharge and itching. Respiratory: Negative for shortness of breath and wheezing. Cardiovascular: Negative for chest pain, palpitations and leg swelling.    Gastrointestinal: Negative for abdominal distention, abdominal pain, blood in stool, nausea and vomiting. Endocrine: Negative for cold intolerance, heat intolerance and polydipsia. Genitourinary: Negative for flank pain, frequency, hematuria and urgency. Musculoskeletal: Negative for arthralgias, back pain and joint swelling. Skin: Negative for rash and wound. Allergic/Immunologic: Negative for environmental allergies and food allergies. Neurological: Negative for dizziness, tremors, syncope, weakness, numbness and headaches. Hematological: Negative for adenopathy. Psychiatric/Behavioral: Positive for agitation, behavioral problems and confusion. Negative for hallucinations. The patient is not nervous/anxious. Objective:      /74   Pulse 87   Ht 5' 5\" (1.651 m)   Wt 129 lb (58.5 kg)   SpO2 98%   BMI 21.47 kg/m²    Physical Exam  Constitutional:       General: She is not in acute distress. Appearance: She is well-developed. She is not diaphoretic. HENT:      Head: Normocephalic and atraumatic. Right Ear: External ear normal.      Left Ear: External ear normal.      Nose: Nose normal.      Mouth/Throat:      Pharynx: No oropharyngeal exudate. Eyes:      General: No scleral icterus. Right eye: No discharge. Left eye: No discharge. Conjunctiva/sclera: Conjunctivae normal.      Pupils: Pupils are equal, round, and reactive to light. Neck:      Musculoskeletal: Normal range of motion and neck supple. Thyroid: No thyromegaly. Vascular: No JVD. Trachea: No tracheal deviation. Cardiovascular:      Rate and Rhythm: Normal rate and regular rhythm. Heart sounds: Normal heart sounds. No murmur. No friction rub. No gallop. Pulmonary:      Effort: Pulmonary effort is normal. No respiratory distress. Breath sounds: Normal breath sounds. No wheezing or rales. Abdominal:      General: Bowel sounds are normal. There is no distension. Palpations: Abdomen is soft.  There is exercise. Patient agreed with treatment plan. **This report has been created usingvoice recognition software. It may contain minor errors which are inherent in voicerecognition technology. **    Electronically signed by Carol Zamarripa MD on 10/6/2020 at 2:58 PM

## 2020-10-26 ENCOUNTER — OFFICE VISIT (OUTPATIENT)
Dept: INTERNAL MEDICINE | Age: 85
End: 2020-10-26
Payer: MEDICARE

## 2020-10-26 VITALS
WEIGHT: 135 LBS | HEART RATE: 76 BPM | SYSTOLIC BLOOD PRESSURE: 120 MMHG | DIASTOLIC BLOOD PRESSURE: 70 MMHG | BODY MASS INDEX: 22.47 KG/M2 | OXYGEN SATURATION: 95 %

## 2020-10-26 PROBLEM — R41.3 MEMORY LOSS: Status: ACTIVE | Noted: 2020-10-26

## 2020-10-26 LAB
APPEARANCE FLUID: CLEAR
BILIRUBIN, POC: NORMAL
BLOOD URINE, POC: NORMAL
CLARITY, POC: CLEAR
COLOR, POC: CLEAR
GLUCOSE URINE, POC: NORMAL
KETONES, POC: NORMAL
LEUKOCYTE EST, POC: NORMAL
NITRITE, POC: NORMAL
PH, POC: 6.5
PROTEIN, POC: NORMAL
SPECIFIC GRAVITY, POC: 1.01
UROBILINOGEN, POC: 1

## 2020-10-26 PROCEDURE — 1036F TOBACCO NON-USER: CPT | Performed by: INTERNAL MEDICINE

## 2020-10-26 PROCEDURE — 81002 URINALYSIS NONAUTO W/O SCOPE: CPT | Performed by: INTERNAL MEDICINE

## 2020-10-26 PROCEDURE — G8484 FLU IMMUNIZE NO ADMIN: HCPCS | Performed by: INTERNAL MEDICINE

## 2020-10-26 PROCEDURE — 1090F PRES/ABSN URINE INCON ASSESS: CPT | Performed by: INTERNAL MEDICINE

## 2020-10-26 PROCEDURE — 1123F ACP DISCUSS/DSCN MKR DOCD: CPT | Performed by: INTERNAL MEDICINE

## 2020-10-26 PROCEDURE — 4040F PNEUMOC VAC/ADMIN/RCVD: CPT | Performed by: INTERNAL MEDICINE

## 2020-10-26 PROCEDURE — 99214 OFFICE O/P EST MOD 30 MIN: CPT | Performed by: INTERNAL MEDICINE

## 2020-10-26 PROCEDURE — G8427 DOCREV CUR MEDS BY ELIG CLIN: HCPCS | Performed by: INTERNAL MEDICINE

## 2020-10-26 PROCEDURE — G8420 CALC BMI NORM PARAMETERS: HCPCS | Performed by: INTERNAL MEDICINE

## 2020-10-26 ASSESSMENT — PATIENT HEALTH QUESTIONNAIRE - PHQ9
SUM OF ALL RESPONSES TO PHQ QUESTIONS 1-9: 0
SUM OF ALL RESPONSES TO PHQ9 QUESTIONS 1 & 2: 0
2. FEELING DOWN, DEPRESSED OR HOPELESS: 0
SUM OF ALL RESPONSES TO PHQ QUESTIONS 1-9: 0
1. LITTLE INTEREST OR PLEASURE IN DOING THINGS: 0
SUM OF ALL RESPONSES TO PHQ QUESTIONS 1-9: 0

## 2020-10-26 ASSESSMENT — ENCOUNTER SYMPTOMS
ABDOMINAL PAIN: 0
SORE THROAT: 0
BLOOD IN STOOL: 0
SINUS PRESSURE: 0
VOMITING: 0
TROUBLE SWALLOWING: 0
WHEEZING: 0
NAUSEA: 0
ABDOMINAL DISTENTION: 0
EYE DISCHARGE: 0
SHORTNESS OF BREATH: 0
BACK PAIN: 0
EYE ITCHING: 0

## 2020-10-26 NOTE — PROGRESS NOTES
[facility] [address] [facility]  [address] [address]  [address] [address]  Dept: [phone]  Dept Fax: [phone]: [phone]      Visit Date: 10/26/2020    Jessica España a 80 y.o. female who presents today for:  Chief Complaint   Patient presents with    3 Month Follow-Up     has not started the seraquel tried melatonin and its working    Hypertension         HPI:     [place] is in for a follow-up visit. We saw her 3 4 weeks ago and she was having increased confusion we did an CT scan of her head he is back for follow-up. No past medical history on file. Past Surgical History:   Procedure Laterality Date    EYE SURGERY      HIP SURGERY         No family history on file. Social History     Tobacco Use    Smoking status: Never Smoker    Smokeless tobacco: Never Used   Substance Use Topics    Alcohol use: No      Current Outpatient Medications   Medication Sig Dispense Refill    MELATONIN PO Take by mouth      acetaminophen (TYLENOL) 325 MG tablet Take 2 tablets by mouth every 8 hours as needed for Pain (For temp greater than 100.5 F (38 C)) 120 tablet 0    denosumab (PROLIA) 60 MG/ML SOLN SC injection Inject 60 mg into the skin once       No current facility-administered medications for this visit. Allergies   Allergen Reactions    Morphine Other (See Comments)    Penicillins Nausea Only         Subjective:     Review of Systems   Constitutional: Negative for activity change, appetite change, fatigue and fever. HENT: Negative for congestion, hearing loss, sinus pressure, sore throat and trouble swallowing. Eyes: Negative for discharge and itching. Respiratory: Negative for shortness of breath and wheezing. Cardiovascular: Negative for chest pain, palpitations and leg swelling. Gastrointestinal: Negative for abdominal distention, abdominal pain, blood in stool, nausea and vomiting.    Endocrine: Negative for cold intolerance, tenderness or deformity. Lymphadenopathy:      Cervical: No cervical adenopathy. Skin:     General: Skin is warm and dry. Coloration: Skin is not pale. Findings: No erythema or rash. Neurological:      Mental Status: She is alert and oriented to person, place, and time. Cranial Nerves: No cranial nerve deficit. Motor: No abnormal muscle tone. Coordination: Coordination normal.      Deep Tendon Reflexes: Reflexes are normal and symmetric. Reflexes normal.          Assessment:      Diagnosis Orders   1. Essential hypertension     2. Memory loss              Plan:     Essential hypertension which is under good control. Blood pressure is 120/70. She is doing it without medication and will continue the same. Memory loss which is getting worse. The scan shows a progression in the small vessel cerebrovascular disease and atrophy from a year ago. I gave him some Seroquel to give her for her issues with behavioral and they did not get it filled I did not give it to her and they have been giving her melatonin instead. I instructed him on the fact that she does not need to be by herself because she does sundown in the afternoon and she is by herself at night they still have not resolved that issue. They are not staying with her at night and I told her that it is very dangerous for her to be alone is confused that she gets and that she needs 24-hour care. They are looking into assisted living. I am going to see her back in 3 months. Have spent 25 minutes with patient today, 50% of the time is been spent counseling on cardiovascular risk factors, fall risk precautions, and immunizations,      Return in about 3 months (around 1/26/2021). Patient given educational materials- see patient instructions. Discussed use, benefit, and side effects of prescribedmedications. All patient questions answered. Pt voiced understanding. Reviewedhealth maintenance.   Instructed to continue current medications, diet and exercise. Patient agreed with treatment plan. **This report has been created usingvoice recognition software. It may contain minor errors which are inherent in voicerecognition technology. **    Electronically signed by Carol Zamarripa MD on 10/26/2020 at 2:49 PM

## 2021-01-04 LAB
CALCIUM SERPL-MCNC: 9.5 MG/DL (ref 8.8–10.2)
VITAMIN D 25-HYDROXY: 60.2 NG/ML

## 2021-01-11 ENCOUNTER — HOSPITAL ENCOUNTER (OUTPATIENT)
Dept: INFUSION THERAPY | Age: 86
Setting detail: INFUSION SERIES
Discharge: HOME | End: 2021-01-11
Payer: MEDICARE

## 2021-01-11 VITALS
RESPIRATION RATE: 18 BRPM | HEART RATE: 90 BPM | SYSTOLIC BLOOD PRESSURE: 153 MMHG | DIASTOLIC BLOOD PRESSURE: 86 MMHG | OXYGEN SATURATION: 99 % | TEMPERATURE: 96.8 F

## 2021-01-11 DIAGNOSIS — M81.0 AGE-RELATED OSTEOPOROSIS WITHOUT CURRENT PATHOLOGICAL FRACTURE: Primary | ICD-10-CM

## 2021-01-11 PROCEDURE — 96372 THER/PROPH/DIAG INJ SC/IM: CPT

## 2021-01-11 PROCEDURE — 6360000002 HC RX W HCPCS: Performed by: NURSE PRACTITIONER

## 2021-01-11 RX ADMIN — DENOSUMAB 60 MG: 60 INJECTION SUBCUTANEOUS at 14:15

## 2021-03-25 ENCOUNTER — OFFICE VISIT (OUTPATIENT)
Dept: INTERNAL MEDICINE | Age: 86
End: 2021-03-25
Payer: MEDICARE

## 2021-03-25 VITALS
WEIGHT: 130 LBS | SYSTOLIC BLOOD PRESSURE: 120 MMHG | HEART RATE: 72 BPM | DIASTOLIC BLOOD PRESSURE: 80 MMHG | BODY MASS INDEX: 21.63 KG/M2

## 2021-03-25 DIAGNOSIS — I10 ESSENTIAL HYPERTENSION: Primary | ICD-10-CM

## 2021-03-25 DIAGNOSIS — R30.0 DYSURIA: ICD-10-CM

## 2021-03-25 DIAGNOSIS — R41.3 MEMORY LOSS: ICD-10-CM

## 2021-03-25 DIAGNOSIS — M81.0 OSTEOPOROSIS, UNSPECIFIED OSTEOPOROSIS TYPE, UNSPECIFIED PATHOLOGICAL FRACTURE PRESENCE: ICD-10-CM

## 2021-03-25 LAB
BACTERIA: NEGATIVE /HPF
BILIRUBIN URINE: NEGATIVE
BLOOD, URINE: ABNORMAL
CLARITY: ABNORMAL
COLOR: YELLOW
CRYSTALS, UA: ABNORMAL /HPF
EPITHELIAL CELLS, UA: 0 /HPF (ref 0–5)
GLUCOSE URINE: NEGATIVE MG/DL
HYALINE CASTS: 0 /HPF (ref 0–8)
KETONES, URINE: NEGATIVE MG/DL
LEUKOCYTE ESTERASE, URINE: ABNORMAL
NITRITE, URINE: NEGATIVE
PH UA: 6 (ref 5–8)
PROTEIN UA: NEGATIVE MG/DL
RBC UA: 2 /HPF (ref 0–4)
SPECIFIC GRAVITY UA: 1.01 (ref 1–1.03)
UROBILINOGEN, URINE: 0.2 E.U./DL
WBC UA: 239 /HPF (ref 0–5)

## 2021-03-25 PROCEDURE — 99214 OFFICE O/P EST MOD 30 MIN: CPT | Performed by: INTERNAL MEDICINE

## 2021-03-25 PROCEDURE — 1090F PRES/ABSN URINE INCON ASSESS: CPT | Performed by: INTERNAL MEDICINE

## 2021-03-25 PROCEDURE — G8427 DOCREV CUR MEDS BY ELIG CLIN: HCPCS | Performed by: INTERNAL MEDICINE

## 2021-03-25 PROCEDURE — G8420 CALC BMI NORM PARAMETERS: HCPCS | Performed by: INTERNAL MEDICINE

## 2021-03-25 PROCEDURE — 1036F TOBACCO NON-USER: CPT | Performed by: INTERNAL MEDICINE

## 2021-03-25 PROCEDURE — 4040F PNEUMOC VAC/ADMIN/RCVD: CPT | Performed by: INTERNAL MEDICINE

## 2021-03-25 PROCEDURE — 1123F ACP DISCUSS/DSCN MKR DOCD: CPT | Performed by: INTERNAL MEDICINE

## 2021-03-25 PROCEDURE — G8484 FLU IMMUNIZE NO ADMIN: HCPCS | Performed by: INTERNAL MEDICINE

## 2021-03-25 SDOH — ECONOMIC STABILITY: TRANSPORTATION INSECURITY
IN THE PAST 12 MONTHS, HAS LACK OF TRANSPORTATION KEPT YOU FROM MEETINGS, WORK, OR FROM GETTING THINGS NEEDED FOR DAILY LIVING?: NOT ASKED

## 2021-03-25 SDOH — ECONOMIC STABILITY: INCOME INSECURITY: HOW HARD IS IT FOR YOU TO PAY FOR THE VERY BASICS LIKE FOOD, HOUSING, MEDICAL CARE, AND HEATING?: NOT HARD AT ALL

## 2021-03-25 SDOH — ECONOMIC STABILITY: FOOD INSECURITY: WITHIN THE PAST 12 MONTHS, THE FOOD YOU BOUGHT JUST DIDN'T LAST AND YOU DIDN'T HAVE MONEY TO GET MORE.: NEVER TRUE

## 2021-03-25 SDOH — ECONOMIC STABILITY: FOOD INSECURITY: WITHIN THE PAST 12 MONTHS, YOU WORRIED THAT YOUR FOOD WOULD RUN OUT BEFORE YOU GOT MONEY TO BUY MORE.: NEVER TRUE

## 2021-03-25 ASSESSMENT — ENCOUNTER SYMPTOMS
SINUS PRESSURE: 0
ABDOMINAL PAIN: 0
VOMITING: 0
BLOOD IN STOOL: 0
BACK PAIN: 0
NAUSEA: 0
TROUBLE SWALLOWING: 0
EYE DISCHARGE: 0
EYE ITCHING: 0
WHEEZING: 0
ABDOMINAL DISTENTION: 0
SORE THROAT: 0
SHORTNESS OF BREATH: 0

## 2021-03-25 NOTE — PROGRESS NOTES
[facility]  [address] [facility] [address]  [address] [address]  Dept: [phone]  Dept Fax: [phone]: [phone]      Visit Date: 3/25/2021    Timur Rodriguez a 80 y.o. female who presents today for:  Chief Complaint   Patient presents with    3 Month Follow-Up         HPI:     Continues in for follow-up visit. She is doing okay with exception of her continued confusion and memory loss. She also has chronic urinary tract infections and is complaining of dysuria today so we will check a urine. No past medical history on file. Past Surgical History:   Procedure Laterality Date    EYE SURGERY      HIP SURGERY         No family history on file. Social History     Tobacco Use    Smoking status: Never Smoker    Smokeless tobacco: Never Used   Substance Use Topics    Alcohol use: No      Current Outpatient Medications   Medication Sig Dispense Refill    MELATONIN PO Take by mouth      acetaminophen (TYLENOL) 325 MG tablet Take 2 tablets by mouth every 8 hours as needed for Pain (For temp greater than 100.5 F (38 C)) 120 tablet 0    denosumab (PROLIA) 60 MG/ML SOLN SC injection Inject 60 mg into the skin once       No current facility-administered medications for this visit. Allergies   Allergen Reactions    Morphine Other (See Comments)    Penicillins Nausea Only         Subjective:     Review of Systems   Constitutional: Positive for fatigue. Negative for activity change, appetite change and fever. HENT: Negative for congestion, hearing loss, sinus pressure, sore throat and trouble swallowing. Eyes: Negative for discharge and itching. Respiratory: Negative for shortness of breath and wheezing. Cardiovascular: Negative for chest pain, palpitations and leg swelling. Gastrointestinal: Negative for abdominal distention, abdominal pain, blood in stool, nausea and vomiting.    Endocrine: Negative for cold intolerance, heat intolerance and polydipsia. Genitourinary: Positive for dysuria. Negative for flank pain, frequency, hematuria and urgency. Musculoskeletal: Negative for arthralgias, back pain and joint swelling. Skin: Negative for rash and wound. Allergic/Immunologic: Negative for environmental allergies and food allergies. Neurological: Negative for dizziness, tremors, syncope, weakness, numbness and headaches. Hematological: Negative for adenopathy. Psychiatric/Behavioral: Positive for confusion. Negative for agitation and hallucinations. The patient is not nervous/anxious. Objective:      /80   Pulse 72   Wt 130 lb (59 kg)   BMI 21.63 kg/m²    Physical Exam  Constitutional:       General: She is not in acute distress. Appearance: She is well-developed. She is not diaphoretic. HENT:      Head: Normocephalic and atraumatic. Right Ear: External ear normal.      Left Ear: External ear normal.      Nose: Nose normal.      Mouth/Throat:      Pharynx: No oropharyngeal exudate. Eyes:      General: No scleral icterus. Right eye: No discharge. Left eye: No discharge. Conjunctiva/sclera: Conjunctivae normal.      Pupils: Pupils are equal, round, and reactive to light. Neck:      Musculoskeletal: Normal range of motion and neck supple. Thyroid: No thyromegaly. Vascular: No JVD. Trachea: No tracheal deviation. Cardiovascular:      Rate and Rhythm: Normal rate and regular rhythm. Heart sounds: Normal heart sounds. No murmur. No friction rub. No gallop. Pulmonary:      Effort: Pulmonary effort is normal. No respiratory distress. Breath sounds: Normal breath sounds. No wheezing or rales. Abdominal:      General: Bowel sounds are normal. There is no distension. Palpations: Abdomen is soft. There is no mass. Tenderness: There is no abdominal tenderness. There is no guarding or rebound. Musculoskeletal: Normal range of motion. General: No tenderness or deformity. Lymphadenopathy:      Cervical: No cervical adenopathy. Skin:     General: Skin is warm and dry. Coloration: Skin is not pale. Findings: No erythema or rash. Neurological:      Mental Status: She is alert and oriented to person, place, and time. Cranial Nerves: No cranial nerve deficit. Motor: No abnormal muscle tone. Coordination: Coordination normal.      Deep Tendon Reflexes: Reflexes are normal and symmetric. Reflexes normal.   Psychiatric:         Behavior: Behavior normal.         Thought Content: Thought content normal.         Judgment: Judgment normal.          Assessment:      Diagnosis Orders   1. Essential hypertension     2. Memory loss     3. Osteoporosis, unspecified osteoporosis type, unspecified pathological fracture presence     4. Dysuria              Plan:     Essential hypertension. She gets very nervous and has a terrible case of whitecoat syndrome and I letter sent for a few minutes and then rechecked it and got 120/80. I do not want to overmedicate her because she tends to really bottom out when we tried her on blood pressure medication in the past so I think she is basically okay at home and the daughter checks it at home and states that is usually in a good range. Memory loss which appears to be ongoing and not any worse than it was on her last visit. Osteoporosis which is stable she continues on her Prolia with no evidence of any issues. Is chronic dysuria. We will check another urine on her today  As she usually tends to get urinary tract infections. Otherwise she is stable. We will let them know the results of the urine. She is getting her COVID-19 vaccine next week. I will see her back in 6 months. Return in about 4 months (around 7/25/2021) for schedule for AWV, LAB 1 WEEK BEFORE APPOINTMENT. Patient given educational materials- see patient instructions.   Discussed use, benefit, and side effects of prescribedmedications. All patient questions answered. Pt voiced understanding. Reviewedhealth maintenance. Instructed to continue current medications, diet and exercise. Patient agreed with treatment plan. **This report has been created usingvoice recognition software. It may contain minor errors which are inherent in voicerecognition technology. **    Electronically signed by Maximo Leon MD on 3/25/2021 at 3:01 PM

## 2021-03-27 LAB — URINE CULTURE, ROUTINE: NORMAL

## 2021-03-31 ENCOUNTER — IMMUNIZATION (OUTPATIENT)
Age: 86
End: 2021-03-31
Payer: MEDICARE

## 2021-03-31 PROCEDURE — 0001A COVID-19, PFIZER VACCINE 30MCG/0.3ML DOSE: CPT | Performed by: FAMILY MEDICINE

## 2021-03-31 PROCEDURE — 91300 COVID-19, PFIZER VACCINE 30MCG/0.3ML DOSE: CPT | Performed by: FAMILY MEDICINE

## 2021-04-21 ENCOUNTER — IMMUNIZATION (OUTPATIENT)
Age: 86
End: 2021-04-21
Payer: MEDICARE

## 2021-04-21 PROCEDURE — 91300 COVID-19, PFIZER VACCINE 30MCG/0.3ML DOSE: CPT | Performed by: FAMILY MEDICINE

## 2021-04-21 PROCEDURE — 0002A COVID-19, PFIZER VACCINE 30MCG/0.3ML DOSE: CPT | Performed by: FAMILY MEDICINE

## 2021-08-06 DIAGNOSIS — R30.0 DYSURIA: Primary | ICD-10-CM

## 2021-08-10 ENCOUNTER — OFFICE VISIT (OUTPATIENT)
Dept: INTERNAL MEDICINE | Age: 86
End: 2021-08-10
Payer: MEDICARE

## 2021-08-10 VITALS
BODY MASS INDEX: 21.53 KG/M2 | WEIGHT: 134 LBS | HEART RATE: 90 BPM | OXYGEN SATURATION: 93 % | HEIGHT: 66 IN | RESPIRATION RATE: 16 BRPM | SYSTOLIC BLOOD PRESSURE: 130 MMHG | DIASTOLIC BLOOD PRESSURE: 80 MMHG

## 2021-08-10 DIAGNOSIS — Z00.00 ROUTINE GENERAL MEDICAL EXAMINATION AT A HEALTH CARE FACILITY: ICD-10-CM

## 2021-08-10 DIAGNOSIS — I10 ESSENTIAL HYPERTENSION: Primary | ICD-10-CM

## 2021-08-10 DIAGNOSIS — R41.3 MEMORY LOSS: ICD-10-CM

## 2021-08-10 PROCEDURE — G0438 PPPS, INITIAL VISIT: HCPCS | Performed by: INTERNAL MEDICINE

## 2021-08-10 PROCEDURE — 1123F ACP DISCUSS/DSCN MKR DOCD: CPT | Performed by: INTERNAL MEDICINE

## 2021-08-10 PROCEDURE — 4040F PNEUMOC VAC/ADMIN/RCVD: CPT | Performed by: INTERNAL MEDICINE

## 2021-08-10 ASSESSMENT — LIFESTYLE VARIABLES: HOW OFTEN DO YOU HAVE A DRINK CONTAINING ALCOHOL: 0

## 2021-08-10 ASSESSMENT — PATIENT HEALTH QUESTIONNAIRE - PHQ9
SUM OF ALL RESPONSES TO PHQ QUESTIONS 1-9: 0
SUM OF ALL RESPONSES TO PHQ9 QUESTIONS 1 & 2: 0
2. FEELING DOWN, DEPRESSED OR HOPELESS: 0
1. LITTLE INTEREST OR PLEASURE IN DOING THINGS: 0

## 2021-08-10 NOTE — PROGRESS NOTES
place and time, well developed and well- nourished, in no acute distress  Skin: warm and dry, no rash or erythema  Head: normocephalic and atraumatic  Eyes: pupils equal, round, and reactive to light, extraocular eye movements intact, conjunctivae normal  ENT: tympanic membrane, external ear and ear canal normal bilaterally, nose without deformity, nasal mucosa and turbinates normal without polyps  Neck: supple and non-tender without mass, no thyromegaly or thyroid nodules, no cervical lymphadenopathy  Pulmonary/Chest: clear to auscultation bilaterally- no wheezes, rales or rhonchi, normal air movement, no respiratory distress  Cardiovascular: normal rate, regular rhythm, normal S1 and S2, no murmurs, rubs, clicks, or gallops, distal pulses intact, no carotid bruits  Abdomen: soft, non-tender, non-distended, normal bowel sounds, no masses or organomegaly  Extremities: no cyanosis, clubbing or edema  Musculoskeletal: normal range of motion, no joint swelling, deformity or tenderness  Neurologic: reflexes normal and symmetric, no cranial nerve deficit, gait, coordination and speech normal    Patient's complete Health Risk Assessment and screening values have been reviewed and are found in Flowsheets. The following problems were reviewed today and where indicated follow up appointments were made and/or referrals ordered. Positive Risk Factor Screenings with Interventions:     Fall Risk:  2 or more falls in past year?: (!) yes  Fall with injury in past year?: no  Fall Risk Interventions:    · Home safety tips provided          General Health and ACP:  General  In general, how would you say your health is?: Fair  In the past 7 days, have you experienced any of the following?  New or Increased Pain, New or Increased Fatigue, Loneliness, Social Isolation, Stress or Anger?: None of These  Do you get the social and emotional support that you need?: Yes  Do you have a Living Will?: (!) No  Advance Directives     Power of  Living Will ACP-Advance Directive ACP-Power of     Not on File Not on File Not on File Not on File      General Health Risk Interventions:  · None    Health Habits/Nutrition:  Health Habits/Nutrition  Do you exercise for at least 20 minutes 2-3 times per week?: (!) No  Have you lost any weight without trying in the past 3 months?: No  Do you eat only one meal per day?: No  Have you seen the dentist within the past year?: Yes  Body mass index: 21.63  Health Habits/Nutrition Interventions:  · None    Hearing/Vision:  No exam data present  Hearing/Vision  Do you or your family notice any trouble with your hearing that hasn't been managed with hearing aids?: No  Do you have difficulty driving, watching TV, or doing any of your daily activities because of your eyesight?: No  Have you had an eye exam within the past year?: (!) No  Hearing/Vision Interventions:  · None     ADL:  ADLs  In the past 7 days, did you need help from others to perform any of the following everyday activities? Eating, dressing, grooming, bathing, toileting, or walking/balance?: (!) Dressing, Grooming, Bathing, Toileting, Walking/Balance  In the past 7 days, did you need help from others to take care of any of the following?  Laundry, housekeeping, banking/finances, shopping, telephone use, food preparation, transportation, or taking medications?: [employer], Housekeeping, Banking/Finances, Shopping, Telephone Use, Food Preparation, Transportation, Taking Medications  ADL Interventions:  · Patient declines any further evaluation/treatment for this issue    Personalized Preventive Plan   Current Health Maintenance Status  Immunization History   Administered Date(s) Administered    COVID-19, Goff Peter, PF, 30mcg/0.3mL 03/31/2021, 04/21/2021        Health Maintenance   Topic Date Due    Annual Wellness Visit (AWV)  Never done    Shingles Vaccine (1 of 2) 10/29/2021 (Originally 5/22/1982)    Flu vaccine (1) 10/28/2022 (Originally 9/1/2021)    Pneumococcal 65+ years Vaccine (1 of 1 - PPSV23) 07/28/2023 (Originally 5/22/1997)    DTaP/Tdap/Td vaccine (1 - Tdap) 01/31/2025 (Originally 5/22/1951)    Potassium monitoring  08/09/2022    Creatinine monitoring  08/09/2022    COVID-19 Vaccine  Completed    Hepatitis A vaccine  Aged Out    Hepatitis B vaccine  Aged Out    Hib vaccine  Aged Out    Meningococcal (ACWY) vaccine  Aged Out     Recommendations for [address] Due: see orders and patient instructions/AVS.  . Recommended screening schedule for the next 5-10 years is provided to the patient in written form: see Patient Macrina Rene was seen today for medicare aw. Diagnoses and all orders for this visit:    Essential hypertension    Memory loss    Patient is doing as well as can be expected. The memory issues in her balance issues and increased fall risk to her primary problems. Daughters are taking care of her the best they can has they both have to work. She is reached the point where they are going to have to start spending the night with her because she is getting out of the house at night and is a fall risk. We will continue to monitor as she is doing. Have gone over her lab with her today and we will see her back again in 6 months.

## 2021-08-10 NOTE — PATIENT INSTRUCTIONS
Personalized Preventive Plan for [address] [place] [address] - 8/10/2021  Medicare offers a range of preventive health benefits. Some of the tests and screenings are paid in full while other may be subject to a deductible, co-insurance, and/or copay. Some of these benefits include a comprehensive review of your medical history including lifestyle, illnesses that may run in your family, and various assessments and screenings as appropriate. After reviewing your medical record and screening and assessments performed today your provider may have ordered immunizations, labs, imaging, and/or referrals for you. A list of these orders (if applicable) as well as your Preventive Care list are included within your After Visit Summary for your review. Other Preventive Recommendations:    · A preventive eye exam performed by an eye specialist is recommended every 1-2 years to screen for glaucoma; cataracts, macular degeneration, and other eye disorders. · A preventive dental visit is recommended every 6 months. · Try to get at least 150 minutes of exercise per week or 10,000 steps per day on a pedometer . · Order or download the FREE \"Exercise & Physical Activity: Your Everyday Guide\" from [employer]. Call 6-341.912.8692 or search [employer] online. · You need 8663-9737 mg of calcium and 8473-7536 IU of vitamin D per day. It is possible to meet your calcium requirement with diet alone, but a vitamin D supplement is usually necessary to meet this goal.  · When exposed to the sun, use a sunscreen that protects against both UVA and UVB radiation with an SPF of 30 or greater. Reapply every 2 to 3 hours or after sweating, drying off with a towel, or swimming. · Always wear a seat belt when traveling in a car. Always wear a helmet when riding a bicycle or motorcycle.

## 2021-08-13 LAB
CALCIUM SERPL-MCNC: 9.5 MG/DL (ref 8.8–10.2)
VITAMIN D 25-HYDROXY: 60.3 NG/ML

## 2021-08-18 ENCOUNTER — HOSPITAL ENCOUNTER (OUTPATIENT)
Dept: INFUSION THERAPY | Age: 86
Setting detail: INFUSION SERIES
Discharge: HOME | End: 2021-08-18
Payer: MEDICARE

## 2021-08-18 DIAGNOSIS — M81.0 AGE-RELATED OSTEOPOROSIS WITHOUT CURRENT PATHOLOGICAL FRACTURE: Primary | ICD-10-CM

## 2021-08-18 PROCEDURE — 96372 THER/PROPH/DIAG INJ SC/IM: CPT

## 2021-08-18 PROCEDURE — 6360000002 HC RX W HCPCS: Performed by: NURSE PRACTITIONER

## 2021-08-18 RX ADMIN — DENOSUMAB 60 MG: 60 INJECTION SUBCUTANEOUS at 13:58

## 2021-10-20 ENCOUNTER — HOSPITAL ENCOUNTER (EMERGENCY)
Age: 86
Discharge: HOME | End: 2021-10-20
Payer: MEDICARE

## 2021-10-20 ENCOUNTER — APPOINTMENT (OUTPATIENT)
Dept: CT IMAGING | Age: 86
End: 2021-10-20
Payer: MEDICARE

## 2021-10-20 VITALS
DIASTOLIC BLOOD PRESSURE: 80 MMHG | OXYGEN SATURATION: 95 % | HEART RATE: 80 BPM | RESPIRATION RATE: 17 BRPM | TEMPERATURE: 98.8 F | SYSTOLIC BLOOD PRESSURE: 136 MMHG

## 2021-10-20 DIAGNOSIS — N30.00 ACUTE CYSTITIS WITHOUT HEMATURIA: Primary | ICD-10-CM

## 2021-10-20 DIAGNOSIS — R41.82 ALTERED MENTAL STATUS, UNSPECIFIED ALTERED MENTAL STATUS TYPE: ICD-10-CM

## 2021-10-20 LAB
ALBUMIN SERPL-MCNC: 4.4 G/DL (ref 3.5–5.2)
ALP BLD-CCNC: 48 U/L (ref 35–104)
ALT SERPL-CCNC: 18 U/L (ref 5–33)
ANION GAP SERPL CALCULATED.3IONS-SCNC: 13 MMOL/L (ref 7–19)
AST SERPL-CCNC: 23 U/L (ref 5–32)
BACTERIA: ABNORMAL /HPF
BASOPHILS ABSOLUTE: 0.1 K/UL (ref 0–0.2)
BASOPHILS RELATIVE PERCENT: 1 % (ref 0–1)
BILIRUB SERPL-MCNC: 0.5 MG/DL (ref 0.2–1.2)
BILIRUBIN URINE: NEGATIVE
BLOOD, URINE: NEGATIVE
BUN BLDV-MCNC: 19 MG/DL (ref 8–23)
CALCIUM SERPL-MCNC: 9.3 MG/DL (ref 8.8–10.2)
CHLORIDE BLD-SCNC: 96 MMOL/L (ref 98–111)
CLARITY: CLEAR
CO2: 21 MMOL/L (ref 22–29)
COLOR: YELLOW
CREAT SERPL-MCNC: 0.8 MG/DL (ref 0.5–0.9)
CRYSTALS, UA: ABNORMAL /HPF
EOSINOPHILS ABSOLUTE: 0 K/UL (ref 0–0.6)
EOSINOPHILS RELATIVE PERCENT: 0.3 % (ref 0–5)
EPITHELIAL CELLS, UA: 0 /HPF (ref 0–5)
GFR AFRICAN AMERICAN: >59
GFR NON-AFRICAN AMERICAN: >60
GLUCOSE BLD-MCNC: 103 MG/DL (ref 74–109)
GLUCOSE URINE: NEGATIVE MG/DL
HCT VFR BLD CALC: 44 % (ref 37–47)
HEMOGLOBIN: 14.2 G/DL (ref 12–16)
HYALINE CASTS: 0 /HPF (ref 0–8)
IMMATURE GRANULOCYTES #: 0.1 K/UL
KETONES, URINE: NEGATIVE MG/DL
LEUKOCYTE ESTERASE, URINE: ABNORMAL
LYMPHOCYTES ABSOLUTE: 2.2 K/UL (ref 1.1–4.5)
LYMPHOCYTES RELATIVE PERCENT: 17.4 % (ref 20–40)
MCH RBC QN AUTO: 32.9 PG (ref 27–31)
MCHC RBC AUTO-ENTMCNC: 32.3 G/DL (ref 33–37)
MCV RBC AUTO: 101.9 FL (ref 81–99)
MONOCYTES ABSOLUTE: 1.3 K/UL (ref 0–0.9)
MONOCYTES RELATIVE PERCENT: 9.9 % (ref 0–10)
NEUTROPHILS ABSOLUTE: 8.9 K/UL (ref 1.5–7.5)
NEUTROPHILS RELATIVE PERCENT: 70.5 % (ref 50–65)
NITRITE, URINE: POSITIVE
PDW BLD-RTO: 13.8 % (ref 11.5–14.5)
PH UA: 5.5 (ref 5–8)
PLATELET # BLD: 215 K/UL (ref 130–400)
PMV BLD AUTO: 9.3 FL (ref 9.4–12.3)
POTASSIUM REFLEX MAGNESIUM: 4.4 MMOL/L (ref 3.5–5)
PROTEIN UA: NEGATIVE MG/DL
RBC # BLD: 4.32 M/UL (ref 4.2–5.4)
RBC UA: 0 /HPF (ref 0–4)
SODIUM BLD-SCNC: 130 MMOL/L (ref 136–145)
SPECIFIC GRAVITY UA: 1.01 (ref 1–1.03)
TOTAL PROTEIN: 7.6 G/DL (ref 6.6–8.7)
UROBILINOGEN, URINE: 0.2 E.U./DL
WBC # BLD: 12.3 K/UL (ref 4.8–10.8)
WBC UA: 5 /HPF (ref 0–5)

## 2021-10-20 PROCEDURE — 36415 COLL VENOUS BLD VENIPUNCTURE: CPT

## 2021-10-20 PROCEDURE — 81001 URINALYSIS AUTO W/SCOPE: CPT

## 2021-10-20 PROCEDURE — 96374 THER/PROPH/DIAG INJ IV PUSH: CPT

## 2021-10-20 PROCEDURE — 85025 COMPLETE CBC W/AUTO DIFF WBC: CPT

## 2021-10-20 PROCEDURE — 2580000003 HC RX 258: Performed by: PHYSICIAN ASSISTANT

## 2021-10-20 PROCEDURE — 70450 CT HEAD/BRAIN W/O DYE: CPT

## 2021-10-20 PROCEDURE — 99283 EMERGENCY DEPT VISIT LOW MDM: CPT

## 2021-10-20 PROCEDURE — 80053 COMPREHEN METABOLIC PANEL: CPT

## 2021-10-20 PROCEDURE — 6360000002 HC RX W HCPCS: Performed by: PHYSICIAN ASSISTANT

## 2021-10-20 RX ORDER — CEPHALEXIN 500 MG/1
500 CAPSULE ORAL 2 TIMES DAILY
Qty: 14 CAPSULE | Refills: 0 | Status: SHIPPED | OUTPATIENT
Start: 2021-10-20 | End: 2021-10-27

## 2021-10-20 RX ADMIN — CEFTRIAXONE 1000 MG: 1 INJECTION, POWDER, FOR SOLUTION INTRAMUSCULAR; INTRAVENOUS at 22:04

## 2021-10-20 ASSESSMENT — ENCOUNTER SYMPTOMS
RHINORRHEA: 0
BACK PAIN: 0
COLOR CHANGE: 0
NAUSEA: 0
ABDOMINAL DISTENTION: 0
COUGH: 0
SORE THROAT: 0
ABDOMINAL PAIN: 0
APNEA: 0
EYE DISCHARGE: 0
PHOTOPHOBIA: 0
SHORTNESS OF BREATH: 0
EYE PAIN: 0

## 2021-10-21 NOTE — ED PROVIDER NOTES
[facility] - [facility] EMERGENCY DEPT  eMERGENCYdEPARTMENT eNCOUnter      Pt Name: Abbe Jonas  MRN: [id number]  [place] 5/22/1932  Date of evaluation: 10/20/2021  Provider:DAVON Bland    CHIEF COMPLAINT       Chief Complaint   Patient presents with    Fatigue    Altered Mental Status     hx of vascular dementia. accute onset of AMS. HISTORY OF PRESENT ILLNESS  (Location/Symptom, Timing/Onset, Context/Setting, Quality, Duration, Modifying Factors, Severity.)   Abbe Jonas is a 80 y.o. female who presents to the emergency department with complaints of fatigue confusion falling more today. Hx of vascular dementia. She is baseline now per daughter. She lives alone with one sister next door they check on her daily she walks with cane/walker. Family wanting to make sure no stroke. She is not on thinners. HPI    Nursing Notes were reviewed and I agree. REVIEW OF SYSTEMS    (2-9 systems for level 4, 10 or more for level 5)     Review of Systems   Constitutional: Negative for activity change, appetite change, chills and fever. HENT: Negative for congestion, postnasal drip, rhinorrhea and sore throat. Eyes: Negative for photophobia, pain, discharge and visual disturbance. Respiratory: Negative for apnea, cough and shortness of breath. Cardiovascular: Negative for chest pain and leg swelling. Gastrointestinal: Negative for abdominal distention, abdominal pain and nausea. Genitourinary: Negative for vaginal bleeding. Musculoskeletal: Negative for arthralgias, back pain, joint swelling, neck pain and neck stiffness. Skin: Negative for color change and rash. Neurological: Positive for weakness. Negative for dizziness, syncope, facial asymmetry and headaches. Hematological: Negative for adenopathy. Does not bruise/bleed easily. Psychiatric/Behavioral: Positive for confusion. Negative for agitation and behavioral problems.         Except as noted above the remainder of the review of systems was reviewed and negative. PAST MEDICAL HISTORY   No past medical history on file. SURGICAL HISTORY       Past Surgical History:   Procedure Laterality Date    EYE SURGERY      HIP SURGERY           CURRENT MEDICATIONS       Discharge Medication List as of 10/20/2021 10:10 PM      CONTINUE these medications which have NOT CHANGED    Details   MELATONIN PO Take by mouthHistorical Med      acetaminophen (TYLENOL) 325 MG tablet Take 2 tablets by mouth every 8 hours as needed for Pain (For temp greater than 100.5 F (38 C)), Disp-120 tablet, R-0DC to SNF      denosumab (PROLIA) 60 MG/ML SOLN SC injection Inject 60 mg into the skin onceHistorical Med             ALLERGIES     Morphine and Penicillins    FAMILY HISTORY     No family history on file. SOCIAL HISTORY       Social History     Socioeconomic History    Marital status:      Spouse name: Not on file    Number of children: Not on file    Years of education: Not on file    Highest education level: Not on file   Occupational History    Not on file   Tobacco Use    Smoking status: Never Smoker    Smokeless tobacco: Never Used   Substance and Sexual Activity    Alcohol use: No    Drug use: No    Sexual activity: Not on file   Other Topics Concern    Not on file   Social History Narrative    Not on file     Social Determinants of Health     Financial Resource Strain: Low Risk     Difficulty of Paying Living Expenses: Not hard at all   Food Insecurity: No Food Insecurity    Worried About [address] in the Last Year: Never true    [address]  in the Last Year: Never true   Transportation Needs:     Lack of Transportation (Medical):      Lack of Transportation (Non-Medical):    Physical Activity:     Days of Exercise per Week:     Minutes of Exercise per Session:    Stress:     Feeling of Stress :    Social Connections:     Frequency of Communication with Friends and Family:     Frequency of Social Gatherings with verbal subscore is 5. GCS motor subscore is 6. Cranial Nerves: No cranial nerve deficit, dysarthria or facial asymmetry. Sensory: No sensory deficit. Coordination: Coordination normal.   Psychiatric:         Mood and Affect: Mood normal.         Speech: Speech normal.         Behavior: Behavior normal.         Thought Content: Thought content normal.           DIAGNOSTIC RESULTS     RADIOLOGY:   Non-plain film images such as CT, Ultrasound and MRI are read by the radiologist. Plain radiographic images are visualized and preliminarilyinterpreted by No att. providers found with the below findings:      Interpretation per the Radiologist below, if available at the time of this note:    CT HEAD WO CONTRAST   Final Result   1. No acute intracranial process. Stable exam.   Signed by Dr Rebecca Barrera:  Labs Reviewed   URINE RT REFLEX TO CULTURE - Abnormal; Notable for the following components:       Result Value    Nitrite, Urine POSITIVE (*)     Leukocyte Esterase, Urine TRACE (*)     All other components within normal limits   CBC WITH AUTO DIFFERENTIAL - Abnormal; Notable for the following components:    WBC 12.3 (*)     .9 (*)     MCH 32.9 (*)     MCHC 32.3 (*)     MPV 9.3 (*)     Neutrophils % 70.5 (*)     Lymphocytes % 17.4 (*)     Neutrophils Absolute 8.9 (*)     Monocytes Absolute 1.30 (*)     All other components within normal limits   COMPREHENSIVE METABOLIC PANEL W/ REFLEX TO MG FOR LOW K - Abnormal; Notable for the following components:    Sodium 130 (*)     Chloride 96 (*)     CO2 21 (*)     All other components within normal limits   MICROSCOPIC URINALYSIS - Abnormal; Notable for the following components:    Bacteria, UA 4+ (*)     Crystals, UA NEG (*)     All other components within normal limits       All other labs were within normal range or notreturned as of this dictation.     RE-ASSESSMENT        EMERGENCY DEPARTMENT COURSE and DIFFERENTIAL DIAGNOSIS/MDM:   Vitals: Vitals:    10/20/21 1851 10/20/21 2145 10/20/21 2200   BP: (!) 147/93 (!) 159/83 136/80   Pulse: 101 85 80   Resp: 20 17    Temp: 98.8 °F (37.1 °C)     TempSrc: Temporal     SpO2: 96% 93% 95%       MDM  No acute findings on patient's head CT. Patient is 4+ bacteria clean-catch it was a straight cath. She will go home with Keflex. She is pleasant at this time with no other acute findings for neuro exam normal blood work. Her daughter states understanding and will watch her mental status at home over the next 48 hours I also have placed a social consult for potential SNF placement if she continues to decline family is concerned about her. PROCEDURES:    Procedures      FINAL IMPRESSION      1. Acute cystitis without hematuria    2.  Altered mental status, unspecified altered mental status type          DISPOSITION/PLAN   DISPOSITION Decision To Discharge 10/20/2021 10:25:54 PM      PATIENT REFERRED TO:  [facility] EMERGENCY DEPT  Rob Diaz  [phone]    If symptoms worsen      DISCHARGE MEDICATIONS:  Discharge Medication List as of 10/20/2021 10:10 PM      START taking these medications    Details   cephALEXin (KEFLEX) 500 MG capsule Take 1 capsule by mouth 2 times daily for 7 days, Disp-14 capsule, R-0Normal             (Please note that portions of this note were completed with a voice recognition program.  Efforts were made to edit the dictations but occasionallywords are mis-transcribed.)    Blessing Fritz [address] [place] [address]  10/21/21 4103

## 2021-10-25 ENCOUNTER — HOSPITAL ENCOUNTER (EMERGENCY)
Age: 86
Discharge: HOME | End: 2021-10-26
Payer: MEDICARE

## 2021-10-25 ENCOUNTER — APPOINTMENT (OUTPATIENT)
Dept: CT IMAGING | Age: 86
End: 2021-10-25
Payer: MEDICARE

## 2021-10-25 VITALS
DIASTOLIC BLOOD PRESSURE: 74 MMHG | HEART RATE: 93 BPM | SYSTOLIC BLOOD PRESSURE: 144 MMHG | HEIGHT: 66 IN | TEMPERATURE: 97.6 F | OXYGEN SATURATION: 93 % | WEIGHT: 130 LBS | BODY MASS INDEX: 20.89 KG/M2 | RESPIRATION RATE: 18 BRPM

## 2021-10-25 DIAGNOSIS — S01.511A LIP LACERATION, INITIAL ENCOUNTER: ICD-10-CM

## 2021-10-25 DIAGNOSIS — S00.83XA CONTUSION OF FACE, INITIAL ENCOUNTER: ICD-10-CM
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
## 2021-10-25 DIAGNOSIS — S01.81XA LACERATION OF FOREHEAD, INITIAL ENCOUNTER: ICD-10-CM

## 2021-10-25 DIAGNOSIS — S09.90XA CLOSED HEAD INJURY, INITIAL ENCOUNTER: Primary | ICD-10-CM

## 2021-10-25 PROCEDURE — 90471 IMMUNIZATION ADMIN: CPT | Performed by: NURSE PRACTITIONER

## 2021-10-25 PROCEDURE — 12011 RPR F/E/E/N/L/M 2.5 CM/<: CPT

## 2021-10-25 PROCEDURE — 70486 CT MAXILLOFACIAL W/O DYE: CPT

## 2021-10-25 PROCEDURE — 12014 RPR F/E/E/N/L/M 5.1-7.5 CM: CPT

## 2021-10-25 PROCEDURE — 70450 CT HEAD/BRAIN W/O DYE: CPT

## 2021-10-25 PROCEDURE — 72125 CT NECK SPINE W/O DYE: CPT

## 2021-10-25 PROCEDURE — 90715 TDAP VACCINE 7 YRS/> IM: CPT | Performed by: NURSE PRACTITIONER

## 2021-10-25 PROCEDURE — 6360000002 HC RX W HCPCS: Performed by: NURSE PRACTITIONER

## 2021-10-25 PROCEDURE — 99283 EMERGENCY DEPT VISIT LOW MDM: CPT

## 2021-10-25 PROCEDURE — 2500000003 HC RX 250 WO HCPCS: Performed by: NURSE PRACTITIONER

## 2021-10-25 PROCEDURE — 12052 INTMD RPR FACE/MM 2.6-5.0 CM: CPT

## 2021-10-25 RX ORDER — LIDOCAINE HYDROCHLORIDE 10 MG/ML
5 INJECTION, SOLUTION EPIDURAL; INFILTRATION; INTRACAUDAL; PERINEURAL ONCE
Status: COMPLETED | OUTPATIENT
Start: 2021-10-25 | End: 2021-10-25

## 2021-10-25 RX ADMIN — TETANUS TOXOID, REDUCED DIPHTHERIA TOXOID AND ACELLULAR PERTUSSIS VACCINE, ADSORBED 0.5 ML: 5; 2.5; 8; 8; 2.5 SUSPENSION INTRAMUSCULAR at 21:48

## 2021-10-25 RX ADMIN — LIDOCAINE HYDROCHLORIDE 5 ML: 10 INJECTION, SOLUTION EPIDURAL; INFILTRATION; INTRACAUDAL; PERINEURAL at 23:36

## 2021-10-25 ASSESSMENT — PAIN SCALES - GENERAL: PAINLEVEL_OUTOF10: 2

## 2021-10-25 ASSESSMENT — ENCOUNTER SYMPTOMS
ABDOMINAL PAIN: 0
BACK PAIN: 0

## 2021-10-25 ASSESSMENT — PAIN DESCRIPTION - PAIN TYPE: TYPE: ACUTE PAIN

## 2021-10-25 ASSESSMENT — PAIN DESCRIPTION - LOCATION: LOCATION: HEAD

## 2021-10-26 NOTE — ED PROVIDER NOTES
[facility] - [facility] EMERGENCY DEPT  eMERGENCY dEPARTMENT eNCOUnter      Pt Name: [place]  MRN: [id number]  [place] 5/22/1932  Date of evaluation: 10/25/2021  Provider: ALISIA Hdez    CHIEF COMPLAINT       Chief Complaint   Patient presents with   Neo Yaeler     was walking at home, tripped and hit head on door facing, pt denies LOC, not on blood thinner    Head Laceration     above left eyebrow         HISTORY OF PRESENT ILLNESS   (Location/Symptom, Timing/Onset,Context/Setting, Quality, Duration, Modifying Factors, Severity)  Note limiting factors. Tobias Sarmiento a 80 y.o. female who presents to the emergency department for evaluation of fall, head injury. Pt presents with her daughter who tells me patient has history of dementia. She relates that her mental status is at her baseline. She relates that patient tripped and fell tonight while at families house having struck her forehead on door facing. She relates that she did not loose consciousness. She has a vertical laceration along medial forehead. Pt's daughter tells me that she is unsure of patient's last tetanus up date. She denies neck as well as back pain. She has had no chest or abdominal pain. She denies other extremity injury. HPI    Nursing Notes were reviewed. REVIEW OF SYSTEMS    (2-9 systems for level 4, 10 or more for level 5)     Review of Systems   Constitutional: Negative for fever. Cardiovascular: Negative for chest pain. Gastrointestinal: Negative for abdominal pain. Musculoskeletal: Negative for back pain and neck pain. Skin: Positive for wound. A complete review of systems was performed and is negative except as noted above in the HPI. PAST MEDICAL HISTORY   History reviewed. No pertinent past medical history.       SURGICAL HISTORY       Past Surgical History:   Procedure Laterality Date    EYE SURGERY      HIP SURGERY           CURRENT MEDICATIONS       Discharge Medication List as of 10/25/2021 11:51 PM CONTINUE these medications which have NOT CHANGED    Details   cephALEXin (KEFLEX) 500 MG capsule Take 1 capsule by mouth 2 times daily for 7 days, Disp-14 capsule, R-0Normal      MELATONIN PO Take by mouthHistorical Med      acetaminophen (TYLENOL) 325 MG tablet Take 2 tablets by mouth every 8 hours as needed for Pain (For temp greater than 100.5 F (38 C)), Disp-120 tablet, R-0DC to SNF      denosumab (PROLIA) 60 MG/ML SOLN SC injection Inject 60 mg into the skin onceHistorical Med             ALLERGIES     Morphine and Penicillins    FAMILY HISTORY     History reviewed. No pertinent family history. SOCIAL HISTORY       Social History     Socioeconomic History    Marital status:      Spouse name: None    Number of children: None    Years of education: None    Highest education level: None   Occupational History    None   Tobacco Use    Smoking status: Never Smoker    Smokeless tobacco: Never Used   Substance and Sexual Activity    Alcohol use: No    Drug use: No    Sexual activity: None   Other Topics Concern    None   Social History Narrative    None     Social Determinants of Health     Financial Resource Strain: Low Risk     Difficulty of Paying Living Expenses: Not hard at all   Food Insecurity: No Food Insecurity    Worried About Running Out of Food in the Last Year: Never true    [address] in the Last Year: Never true   Transportation Needs:     Lack of Transportation (Medical):      Lack of Transportation (Non-Medical):    Physical Activity:     Days of Exercise per Week:     Minutes of Exercise per Session:    Stress:     Feeling of Stress :    Social Connections:     Frequency of Communication with Friends and Family:     Frequency of Social Gatherings with Friends and Family:     Attends Buddhism Services:     Active Member of Clubs or Organizations:     Attends Club or Organization Meetings:     Marital Status:    Intimate Partner Violence:     Fear of Current or Ex-Partner:     Emotionally Abused:     Physically Abused:     Sexually Abused:        SCREENINGS    Quiana Coma Scale  Eye Opening: Spontaneous  Best Verbal Response: Oriented  Best Motor Response: Obeys commands  Quiana Coma Scale Score: 15        PHYSICAL EXAM    (up to 7 for level 4, 8 or more for level 5)     ED Triage Vitals   BP Temp Temp Source Pulse Resp SpO2 Height Weight   10/25/21 2021 10/25/21 2019 10/25/21 2019 10/25/21 2019 10/25/21 2019 10/25/21 2019 10/25/21 2019 10/25/21 2019   (!) 144/74 97.6 °F (36.4 °C) Temporal 93 18 93 % 5' 6\" (1.676 m) 130 lb (59 kg)       Physical Exam  Vitals reviewed. HENT:      Head: Normocephalic. Right Ear: External ear normal.      Left Ear: External ear normal.   Eyes:      Conjunctiva/sclera: Conjunctivae normal.      Pupils: Pupils are equal, round, and reactive to light. Neck:      Comments: No direct ttp cervical   Cardiovascular:      Rate and Rhythm: Normal rate and regular rhythm. Heart sounds: Normal heart sounds. Pulmonary:      Effort: Pulmonary effort is normal.      Breath sounds: Normal breath sounds. Abdominal:      General: Bowel sounds are normal.      Palpations: Abdomen is soft. Musculoskeletal:         General: Normal range of motion. Cervical back: Normal range of motion. Comments: 5/5 MS equal bilateral upper/lower extremities   Skin:     General: Skin is warm and dry. Neurological:      Mental Status: She is alert and oriented to person, place, and time.          DIAGNOSTIC RESULTS     EKG: All EKG's are interpreted by the Emergency Department Physician who either signs or Co-signs this chart in the absence of acardiologist.        RADIOLOGY:   Non-plain film images such as CT, Ultrasound andMRI are read by the radiologist. Plain radiographic images are visualized and preliminarily interpreted by the emergency physician with the below findings:        Interpretation per the Radiologist below, if available at the time of this note:    CT FACIAL BONES WO CONTRAST    (Results Pending)   CT HEAD WO CONTRAST    (Results Pending)   CT CERVICAL SPINE WO CONTRAST    (Results Pending)     CT HEAD:    No ICH, mass effect or edema. No skull fracture. Soft tissue injury in the forehead    CT FACIAL:    No acute fracture. Soft tissue ranging forehead. Cataract surgery. CT C SPINE:    No evidence of fracture or malalignment. ED BEDSIDE ULTRASOUND:   Performed by ED Physician - none    LABS:  Labs Reviewed - No data to display    All other labs were within normal range or not returned as of this dictation. RE-ASSESSMENT           EMERGENCY DEPARTMENT COURSE and DIFFERENTIALDIAGNOSIS/MDM:   Vitals:    Vitals:    10/25/21 2019 10/25/21 2021   BP:  (!) 144/74   Pulse: 93    Resp: 18    Temp: 97.6 °F (36.4 °C)    TempSrc: Temporal    SpO2: 93%    Weight: 130 lb (59 kg)    Height: 5' 6\" (1.676 m)        MDM      CONSULTS:  None    PROCEDURES:  Unless otherwise notedbelow, none     Lac Repair    Date/Time: 10/25/2021 9:24 PM  Performed by: ALISIA Duncan  Authorized by: ALISIA Duncan     Consent:     Consent obtained:  Verbal    Consent given by:  Patient    Risks discussed:  Infection and pain    Alternatives discussed:  No treatment  Universal protocol:     Patient identity confirmed:  Verbally with patient  Anesthesia (see MAR for exact dosages): Anesthesia method:  Local infiltration    Local anesthetic:  Lidocaine 1% w/o epi  Laceration details:     Location:  Face    Face location:  Forehead    Length (cm):  5  Repair type:     Repair type:   Intermediate  Pre-procedure details:     Preparation:  Patient was prepped and draped in usual sterile fashion  Exploration:     Hemostasis achieved with:  Direct pressure    Wound exploration: wound explored through full range of motion      Wound extent: no fascia violation noted, no foreign bodies/material noted, no nerve damage noted and no tendon damage noted      Contaminated: no    Treatment:     Area cleansed with:  Saline and Betadine    Amount of cleaning:  Standard    Irrigation solution:  Sterile saline    Irrigation volume:  Copious    Irrigation method:  Pressure wash    Visualized foreign bodies/material removed: no    Subcutaneous repair:     Suture size:  5-0    Suture material:  Vicryl    Number of sutures:  2  Skin repair:     Repair method:  Sutures    Suture size:  5-0    Suture material:  Nylon    Suture technique:  Simple interrupted    Number of sutures:  12  Approximation:     Approximation:  Close  Post-procedure details:     Dressing:  Open (no dressing)    Patient tolerance of procedure: Tolerated well, no immediate complications  Lac Repair    Date/Time: 10/25/2021 9:25 PM  Performed by: ALISIA Trevino  Authorized by: ALISIA Trevino     Consent:     Consent obtained:  Verbal    Consent given by:  Patient    Risks discussed:  Infection and pain    Alternatives discussed:  No treatment  Universal protocol:     Patient identity confirmed:  Verbally with patient  Anesthesia (see MAR for exact dosages):      Anesthesia method:  Local infiltration    Local anesthetic:  Lidocaine 1% w/o epi  Laceration details:     Location:  Lip    Lip location:  Upper interior lip    Length (cm):  0.5 (stellate)  Repair type:     Repair type:  Simple  Pre-procedure details:     Preparation:  Patient was prepped and draped in usual sterile fashion  Exploration:     Hemostasis achieved with:  Direct pressure    Wound exploration: wound explored through full range of motion      Wound extent: no fascia violation noted, no foreign bodies/material noted, no nerve damage noted and no tendon damage noted      Contaminated: no    Treatment:     Area cleansed with:  Saline and Betadine    Amount of cleaning:  Standard    Irrigation solution:  Sterile saline    Irrigation method:  Pressure wash    Visualized foreign bodies/material removed: no    Mucous membrane repair:     Suture size:  5-0    Suture material:  Vicryl    Suture technique:  Simple interrupted    Number of sutures:  2  Approximation:     Approximation:  Close  Post-procedure details:     Dressing:  Tube gauze and non-adherent dressing    Patient tolerance of procedure: Tolerated well, no immediate complications        FINAL IMPRESSION     1. Closed head injury, initial encounter    2. Laceration of forehead, initial encounter    3. Lip laceration, initial encounter    4. Fall, initial encounter    5.  Contusion of face, initial encounter          DISPOSITION/PLAN   DISPOSITION Decision To Discharge 10/25/2021 11:39:48 PM      PATIENT REFERRED TO:  Milady Eckert MD  [place] [phone]    Schedule an appointment as soon as possible for a visit in 10 days  For suture removal      DISCHARGE MEDICATIONS:       Discharge Medication List as of 10/25/2021 11:51 PM          (Pleasenote that portions of this note were completed with a voice recognition program.  Efforts were made to edit the dictations but occasionally words are mis-transcribed.)              Kelsie Sheehan, APRN  10/26/21 1275

## 2021-11-05 ENCOUNTER — OFFICE VISIT (OUTPATIENT)
Dept: INTERNAL MEDICINE | Age: 86
End: 2021-11-05
Payer: MEDICARE

## 2021-11-05 VITALS
WEIGHT: 133 LBS | SYSTOLIC BLOOD PRESSURE: 128 MMHG | RESPIRATION RATE: 16 BRPM | OXYGEN SATURATION: 96 % | BODY MASS INDEX: 21.38 KG/M2 | HEIGHT: 66 IN | DIASTOLIC BLOOD PRESSURE: 74 MMHG | HEART RATE: 74 BPM

## 2021-11-05 DIAGNOSIS — N39.0 URINARY TRACT INFECTION WITHOUT HEMATURIA, SITE UNSPECIFIED: ICD-10-CM

## 2021-11-05 DIAGNOSIS — G30.1 LATE ONSET ALZHEIMER'S DEMENTIA WITH BEHAVIORAL DISTURBANCE (HCC): ICD-10-CM

## 2021-11-05 DIAGNOSIS — I10 ESSENTIAL HYPERTENSION: ICD-10-CM

## 2021-11-05 DIAGNOSIS — R30.0 DYSURIA: ICD-10-CM

## 2021-11-05 DIAGNOSIS — R41.3 MEMORY LOSS: Primary | ICD-10-CM

## 2021-11-05 DIAGNOSIS — F02.818 LATE ONSET ALZHEIMER'S DEMENTIA WITH BEHAVIORAL DISTURBANCE (HCC): ICD-10-CM

## 2021-11-05 LAB
BACTERIA: NEGATIVE /HPF
BILIRUBIN URINE: NEGATIVE
BLOOD, URINE: NEGATIVE
CLARITY: CLEAR
COLOR: YELLOW
CRYSTALS, UA: ABNORMAL /HPF
EPITHELIAL CELLS, UA: 2 /HPF (ref 0–5)
GLUCOSE URINE: NEGATIVE MG/DL
HYALINE CASTS: 1 /HPF (ref 0–8)
KETONES, URINE: NEGATIVE MG/DL
LEUKOCYTE ESTERASE, URINE: ABNORMAL
NITRITE, URINE: NEGATIVE
PH UA: 6.5 (ref 5–8)
PROTEIN UA: NEGATIVE MG/DL
RBC UA: 0 /HPF (ref 0–4)
SPECIFIC GRAVITY UA: 1.01 (ref 1–1.03)
UROBILINOGEN, URINE: 0.2 E.U./DL
WBC UA: 3 /HPF (ref 0–5)

## 2021-11-05 PROCEDURE — 1123F ACP DISCUSS/DSCN MKR DOCD: CPT | Performed by: INTERNAL MEDICINE

## 2021-11-05 PROCEDURE — G8420 CALC BMI NORM PARAMETERS: HCPCS | Performed by: INTERNAL MEDICINE

## 2021-11-05 PROCEDURE — 1090F PRES/ABSN URINE INCON ASSESS: CPT | Performed by: INTERNAL MEDICINE

## 2021-11-05 PROCEDURE — G8427 DOCREV CUR MEDS BY ELIG CLIN: HCPCS | Performed by: INTERNAL MEDICINE

## 2021-11-05 PROCEDURE — G8484 FLU IMMUNIZE NO ADMIN: HCPCS | Performed by: INTERNAL MEDICINE

## 2021-11-05 PROCEDURE — 99214 OFFICE O/P EST MOD 30 MIN: CPT | Performed by: INTERNAL MEDICINE

## 2021-11-05 PROCEDURE — 4040F PNEUMOC VAC/ADMIN/RCVD: CPT | Performed by: INTERNAL MEDICINE

## 2021-11-05 PROCEDURE — 1036F TOBACCO NON-USER: CPT | Performed by: INTERNAL MEDICINE

## 2021-11-05 ASSESSMENT — ENCOUNTER SYMPTOMS
TROUBLE SWALLOWING: 0
VOMITING: 0
SHORTNESS OF BREATH: 0
EYE DISCHARGE: 0
ABDOMINAL DISTENTION: 0
BLOOD IN STOOL: 0
SINUS PRESSURE: 0
EYE ITCHING: 0
BACK PAIN: 0
ABDOMINAL PAIN: 0
FACIAL SWELLING: 1
NAUSEA: 0
SORE THROAT: 0
WHEEZING: 0

## 2021-11-05 NOTE — LETTER
[facility] Medicine  [address] [facility] [place] [id number]  Phone: [phone]  Fax: Deysi Saldaña MD        November 5, 2021      To Whom It May Concern,      It is my medical opinion that due to the patient's alzheimer's dementia she needs placement in a skilled nursing facility. If you have any questions please contact my office.       Sincerely,        Zev Ward MD

## 2021-11-05 NOTE — PROGRESS NOTES
[address] [facility]  [address] [address]  [address] [address]  Dept: [phone]  Dept Fax: [phone]: [phone]      Visit Date: 11/5/2021    Claudia levi 80 y.o. female who presents today for:  Chief Complaint   Patient presents with    3 Month Follow-Up    Fall     frequently wants to discuss skilled nursing home         HPI:      80years old and in for suture removal and follow-up on a UTI. Her memory is also getting worse. She has been wandering around the house she has been falling and the patient's family is gotten to the point where they cannot this setting that she is in at the present time. History reviewed. No pertinent past medical history. Past Surgical History:   Procedure Laterality Date    EYE SURGERY      HIP SURGERY         History reviewed. No pertinent family history. Social History     Tobacco Use    Smoking status: Never Smoker    Smokeless tobacco: Never Used   Substance Use Topics    Alcohol use: No      Current Outpatient Medications   Medication Sig Dispense Refill    MELATONIN PO Take by mouth      acetaminophen (TYLENOL) 325 MG tablet Take 2 tablets by mouth every 8 hours as needed for Pain (For temp greater than 100.5 F (38 C)) 120 tablet 0    denosumab (PROLIA) 60 MG/ML SOLN SC injection Inject 60 mg into the skin once       No current facility-administered medications for this visit. Allergies   Allergen Reactions    Morphine Other (See Comments)    Penicillins Nausea Only         Subjective:     Review of Systems   Constitutional: Negative for activity change, appetite change, fatigue and fever. HENT: Positive for facial swelling. Negative for congestion, hearing loss, sinus pressure, sore throat and trouble swallowing. Sutures along the left forehead and down into the left eyebrow secondary to a fall   Eyes: Negative for discharge and itching.    Respiratory: Negative for shortness of breath and wheezing. Cardiovascular: Negative for chest pain, palpitations and leg swelling. Gastrointestinal: Negative for abdominal distention, abdominal pain, blood in stool, nausea and vomiting. Endocrine: Negative for cold intolerance, heat intolerance and polydipsia. Genitourinary: Negative for flank pain, frequency, hematuria and urgency. Musculoskeletal: Negative for arthralgias, back pain and joint swelling. Skin: Negative for rash and wound. Allergic/Immunologic: Negative for environmental allergies and food allergies. Neurological: Positive for dizziness and light-headedness. Negative for tremors, syncope, weakness, numbness and headaches. Hematological: Negative for adenopathy. Psychiatric/Behavioral: Positive for agitation, behavioral problems and confusion. Negative for hallucinations. The patient is nervous/anxious. Objective:      /74   Pulse 74   Resp 16   Ht 5' 6\" (1.676 m)   Wt 133 lb (60.3 kg)   SpO2 96%   BMI 21.47 kg/m²    Physical Exam  Constitutional:       General: She is not in acute distress. Appearance: Normal appearance. She is well-developed. She is not diaphoretic. HENT:      Head: Normocephalic and atraumatic. Comments: Suture was in place on the left side of the forehead extending down into the left eyebrow that he to be removed today. Right Ear: Tympanic membrane and external ear normal.      Left Ear: Tympanic membrane and external ear normal.      Nose: Nose normal.      Mouth/Throat:      Mouth: Mucous membranes are moist.      Pharynx: Oropharynx is clear. No oropharyngeal exudate. Eyes:      General: No scleral icterus. Right eye: No discharge. Left eye: No discharge. Extraocular Movements: Extraocular movements intact. Conjunctiva/sclera: Conjunctivae normal.      Pupils: Pupils are equal, round, and reactive to light. Neck:      Thyroid: No thyromegaly. Vascular: No JVD. Trachea: No tracheal deviation. Cardiovascular:      Rate and Rhythm: Normal rate and regular rhythm. Pulses: Normal pulses. Heart sounds: Normal heart sounds. No murmur heard. No friction rub. No gallop. Pulmonary:      Effort: Pulmonary effort is normal. No respiratory distress. Breath sounds: Normal breath sounds. No wheezing or rales. Abdominal:      General: Abdomen is flat. Bowel sounds are normal. There is no distension. Palpations: Abdomen is soft. There is no mass. Tenderness: There is no abdominal tenderness. There is no guarding or rebound. Musculoskeletal:         General: No tenderness or deformity. Normal range of motion. Cervical back: Normal range of motion and neck supple. Lymphadenopathy:      Cervical: No cervical adenopathy. Skin:     General: Skin is warm and dry. Coloration: Skin is not pale. Findings: No erythema or rash. Neurological:      General: No focal deficit present. Mental Status: She is alert and oriented to person, place, and time. Cranial Nerves: No cranial nerve deficit. Motor: No abnormal muscle tone. Coordination: Coordination normal.      Deep Tendon Reflexes: Reflexes are normal and symmetric. Reflexes normal.   Psychiatric:         Behavior: Behavior normal.         Thought Content: Thought content normal.         Judgment: Judgment normal.      Comments: Confused and disoriented x3          Assessment:      Diagnosis Orders   1. Memory loss     2. Late onset Alzheimer's dementia with behavioral disturbance ([facility] [address]     3. Urinary tract infection without hematuria, site unspecified  Urinalysis Reflex to Culture   4. Dysuria     5. Essential hypertension              Plan:     Alzheimer's dementia and memory loss which is worsening. She has been falling she has been wandering she has been was found in the yard without her pants on she has sutures in her head now from where she fell.   She has been living

## 2021-11-10 ENCOUNTER — APPOINTMENT (OUTPATIENT)
Dept: CT IMAGING | Age: 86
DRG: 690 | End: 2021-11-10
Payer: MEDICARE

## 2021-11-10 ENCOUNTER — HOSPITAL ENCOUNTER (INPATIENT)
Age: 86
LOS: 3 days | Discharge: HOME | DRG: 690 | End: 2021-11-13
Attending: EMERGENCY MEDICINE | Admitting: INTERNAL MEDICINE
Payer: MEDICARE

## 2021-11-10 ENCOUNTER — APPOINTMENT (OUTPATIENT)
Dept: GENERAL RADIOLOGY | Age: 86
DRG: 690 | End: 2021-11-10
Payer: MEDICARE

## 2021-11-10 DIAGNOSIS — S09.90XA INJURY OF HEAD, INITIAL ENCOUNTER: Primary | ICD-10-CM

## 2021-11-10 DIAGNOSIS — S12.200A CLOSED DISPLACED FRACTURE OF THIRD CERVICAL VERTEBRA, UNSPECIFIED FRACTURE MORPHOLOGY, INITIAL ENCOUNTER (HCC): ICD-10-CM
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
## 2021-11-10 PROBLEM — N39.0 UTI (URINARY TRACT INFECTION): Status: ACTIVE | Noted: 2021-11-10

## 2021-11-10 PROBLEM — R29.6 FREQUENT FALLS: Status: ACTIVE | Noted: 2021-11-10

## 2021-11-10 LAB
ALBUMIN SERPL-MCNC: 4 G/DL (ref 3.5–5.2)
ALP BLD-CCNC: 58 U/L (ref 35–104)
ALT SERPL-CCNC: 21 U/L (ref 5–33)
ANION GAP SERPL CALCULATED.3IONS-SCNC: 11 MMOL/L (ref 7–19)
AST SERPL-CCNC: 25 U/L (ref 5–32)
BACTERIA: ABNORMAL /HPF
BILIRUB SERPL-MCNC: 0.5 MG/DL (ref 0.2–1.2)
BILIRUBIN URINE: NEGATIVE
BLOOD, URINE: ABNORMAL
BUN BLDV-MCNC: 12 MG/DL (ref 8–23)
CALCIUM SERPL-MCNC: 8.6 MG/DL (ref 8.8–10.2)
CHLORIDE BLD-SCNC: 97 MMOL/L (ref 98–111)
CLARITY: ABNORMAL
CO2: 25 MMOL/L (ref 22–29)
COLOR: YELLOW
CREAT SERPL-MCNC: 0.6 MG/DL (ref 0.5–0.9)
CRYSTALS, UA: ABNORMAL /HPF
EKG P AXIS: 59 DEGREES
EKG P-R INTERVAL: 186 MS
EKG Q-T INTERVAL: 418 MS
EKG QRS DURATION: 76 MS
EKG QTC CALCULATION (BAZETT): 458 MS
EKG T AXIS: 39 DEGREES
EPITHELIAL CELLS, UA: 0 /HPF (ref 0–5)
GFR AFRICAN AMERICAN: >59
GFR NON-AFRICAN AMERICAN: >60
GLUCOSE BLD-MCNC: 120 MG/DL (ref 74–109)
GLUCOSE URINE: NEGATIVE MG/DL
HCT VFR BLD CALC: 40.1 % (ref 37–47)
HEMOGLOBIN: 12.8 G/DL (ref 12–16)
HYALINE CASTS: 0 /HPF (ref 0–8)
KETONES, URINE: NEGATIVE MG/DL
LEUKOCYTE ESTERASE, URINE: ABNORMAL
MCH RBC QN AUTO: 32.4 PG (ref 27–31)
MCHC RBC AUTO-ENTMCNC: 31.9 G/DL (ref 33–37)
MCV RBC AUTO: 101.5 FL (ref 81–99)
NITRITE, URINE: POSITIVE
PDW BLD-RTO: 13.9 % (ref 11.5–14.5)
PH UA: 7 (ref 5–8)
PLATELET # BLD: 237 K/UL (ref 130–400)
PMV BLD AUTO: 9.1 FL (ref 9.4–12.3)
POTASSIUM REFLEX MAGNESIUM: 4.6 MMOL/L (ref 3.5–5)
PROTEIN UA: 30 MG/DL
RBC # BLD: 3.95 M/UL (ref 4.2–5.4)
RBC UA: 4 /HPF (ref 0–4)
SARS-COV-2, NAAT: NOT DETECTED
SODIUM BLD-SCNC: 133 MMOL/L (ref 136–145)
SPECIFIC GRAVITY UA: 1.01 (ref 1–1.03)
TOTAL PROTEIN: 6.9 G/DL (ref 6.6–8.7)
TROPONIN: 0.02 NG/ML (ref 0–0.03)
UROBILINOGEN, URINE: 0.2 E.U./DL
WBC # BLD: 13.7 K/UL (ref 4.8–10.8)
WBC UA: 355 /HPF (ref 0–5)

## 2021-11-10 PROCEDURE — 2580000003 HC RX 258: Performed by: EMERGENCY MEDICINE

## 2021-11-10 PROCEDURE — 97165 OT EVAL LOW COMPLEX 30 MIN: CPT

## 2021-11-10 PROCEDURE — 92610 EVALUATE SWALLOWING FUNCTION: CPT

## 2021-11-10 PROCEDURE — 6370000000 HC RX 637 (ALT 250 FOR IP): Performed by: NURSE PRACTITIONER

## 2021-11-10 PROCEDURE — 72170 X-RAY EXAM OF PELVIS: CPT

## 2021-11-10 PROCEDURE — 93005 ELECTROCARDIOGRAM TRACING: CPT | Performed by: EMERGENCY MEDICINE

## 2021-11-10 PROCEDURE — 70450 CT HEAD/BRAIN W/O DYE: CPT

## 2021-11-10 PROCEDURE — 36415 COLL VENOUS BLD VENIPUNCTURE: CPT

## 2021-11-10 PROCEDURE — 93010 ELECTROCARDIOGRAM REPORT: CPT | Performed by: INTERNAL MEDICINE

## 2021-11-10 PROCEDURE — 87086 URINE CULTURE/COLONY COUNT: CPT

## 2021-11-10 PROCEDURE — 2580000003 HC RX 258: Performed by: NURSE PRACTITIONER

## 2021-11-10 PROCEDURE — 84484 ASSAY OF TROPONIN QUANT: CPT

## 2021-11-10 PROCEDURE — 81001 URINALYSIS AUTO W/SCOPE: CPT

## 2021-11-10 PROCEDURE — 87635 SARS-COV-2 COVID-19 AMP PRB: CPT

## 2021-11-10 PROCEDURE — 85027 COMPLETE CBC AUTOMATED: CPT

## 2021-11-10 PROCEDURE — 80053 COMPREHEN METABOLIC PANEL: CPT

## 2021-11-10 PROCEDURE — 6360000002 HC RX W HCPCS: Performed by: EMERGENCY MEDICINE

## 2021-11-10 PROCEDURE — 99284 EMERGENCY DEPT VISIT MOD MDM: CPT

## 2021-11-10 PROCEDURE — 72125 CT NECK SPINE W/O DYE: CPT

## 2021-11-10 PROCEDURE — 87186 SC STD MICRODIL/AGAR DIL: CPT

## 2021-11-10 PROCEDURE — 97530 THERAPEUTIC ACTIVITIES: CPT

## 2021-11-10 PROCEDURE — 1210000000 HC MED SURG R&B

## 2021-11-10 PROCEDURE — 71045 X-RAY EXAM CHEST 1 VIEW: CPT

## 2021-11-10 RX ORDER — SODIUM CHLORIDE 9 MG/ML
25 INJECTION, SOLUTION INTRAVENOUS PRN
Status: DISCONTINUED | OUTPATIENT
Start: 2021-11-10 | End: 2021-11-13 | Stop reason: HOSPADM

## 2021-11-10 RX ORDER — ONDANSETRON 2 MG/ML
4 INJECTION INTRAMUSCULAR; INTRAVENOUS EVERY 6 HOURS PRN
Status: DISCONTINUED | OUTPATIENT
Start: 2021-11-10 | End: 2021-11-13 | Stop reason: HOSPADM

## 2021-11-10 RX ORDER — POLYETHYLENE GLYCOL 3350 17 G/17G
17 POWDER, FOR SOLUTION ORAL DAILY PRN
Status: DISCONTINUED | OUTPATIENT
Start: 2021-11-10 | End: 2021-11-13 | Stop reason: HOSPADM

## 2021-11-10 RX ORDER — SODIUM CHLORIDE 0.9 % (FLUSH) 0.9 %
5-40 SYRINGE (ML) INJECTION PRN
Status: DISCONTINUED | OUTPATIENT
Start: 2021-11-10 | End: 2021-11-13 | Stop reason: HOSPADM

## 2021-11-10 RX ORDER — ONDANSETRON 4 MG/1
4 TABLET, ORALLY DISINTEGRATING ORAL EVERY 8 HOURS PRN
Status: DISCONTINUED | OUTPATIENT
Start: 2021-11-10 | End: 2021-11-13 | Stop reason: HOSPADM

## 2021-11-10 RX ORDER — ACETAMINOPHEN 325 MG/1
650 TABLET ORAL EVERY 6 HOURS PRN
Status: DISCONTINUED | OUTPATIENT
Start: 2021-11-10 | End: 2021-11-13 | Stop reason: HOSPADM

## 2021-11-10 RX ORDER — SODIUM CHLORIDE 0.9 % (FLUSH) 0.9 %
5-40 SYRINGE (ML) INJECTION EVERY 12 HOURS SCHEDULED
Status: DISCONTINUED | OUTPATIENT
Start: 2021-11-10 | End: 2021-11-13 | Stop reason: HOSPADM

## 2021-11-10 RX ORDER — ACETAMINOPHEN 650 MG/1
650 SUPPOSITORY RECTAL EVERY 6 HOURS PRN
Status: DISCONTINUED | OUTPATIENT
Start: 2021-11-10 | End: 2021-11-13 | Stop reason: HOSPADM

## 2021-11-10 RX ADMIN — CEFTRIAXONE 1000 MG: 1 INJECTION, POWDER, FOR SOLUTION INTRAMUSCULAR; INTRAVENOUS at 08:56

## 2021-11-10 RX ADMIN — SODIUM CHLORIDE, PRESERVATIVE FREE 10 ML: 5 INJECTION INTRAVENOUS at 22:22

## 2021-11-10 RX ADMIN — SODIUM CHLORIDE, PRESERVATIVE FREE 10 ML: 5 INJECTION INTRAVENOUS at 10:53

## 2021-11-10 RX ADMIN — ACETAMINOPHEN 325 MG: 325 TABLET ORAL at 15:17

## 2021-11-10 ASSESSMENT — PAIN SCALES - GENERAL
PAINLEVEL_OUTOF10: 6
PAINLEVEL_OUTOF10: 0

## 2021-11-10 ASSESSMENT — PAIN DESCRIPTION - PAIN TYPE: TYPE: ACUTE PAIN

## 2021-11-10 ASSESSMENT — PAIN DESCRIPTION - LOCATION: LOCATION: HEAD

## 2021-11-10 NOTE — PROGRESS NOTES
[address] is being assessed upon: Admission    I agree that Mary Mock RN, along with Richy Hoskins  have performed a thorough Head to Toe Skin Assessment on the patient. ALL assessment sites listed below have been assessed.       Areas assessed by both nurses:     [x]   Head, Face, and Ears   [x]   Shoulders, Back, and Chest  [x]   Arms, Elbows, and Hands   [x]   Coccyx, Sacrum, and Ischium  [x]   Legs, Feet, and Heels    Does the Patient have Skin Breakdown? yes    Ronak Prevention initiated: No  Wound Care Orders initiated: No    [facility] nurse consulted for Pressure Injury (Stage 3,4, Unstageable, DTI, NWPT, and Complex wounds) and New or Established Ostomies: No        Primary Nurse eSignature: Earnestine Voss RN on 11/10/2021 at 11:40 AM      Co-Signer eSignature: {Esignature:[id number]}

## 2021-11-10 NOTE — CARE COORDINATION
Patient resides at [facility] and [address] [place] [address]. I called and spoke with Yany Salcedo. She states patient does not have a bed hold but they will take her back upon discharge.     [place]   P  [phone] F  Electronically signed by Sheldon Ritchie RN, BSN on 11/10/2021 at 11:40 AM

## 2021-11-10 NOTE — PROGRESS NOTES
Speech Language Pathology  Facility/Department: [facility]   CLINICAL BEDSIDE SWALLOW EVALUATION    NAME: Joanie Castro  : 1932  MRN: [id number]    ADMISSION DATE: 11/10/2021  ADMITTING DIAGNOSIS: has Low back pain; Depression; Dysuria; Osteoporosis; Closed hip fracture, left, initial encounter ([facility] [address] Essential hypertension; Age-related osteoporosis without current pathological fracture; Weakness; Memory loss; Late onset Alzheimer's dementia with behavioral disturbance ([facility] [address] UTI (urinary tract infection); Frequent falls; and C3 cervical fracture (HCC) on their problem list.        Date of Eval: 11/10/2021  Evaluating Therapist: MICHI Person    Current Diet level:  Current Diet : Regular  Current Liquid Diet : Thin      Pain:  Pain Assessment  Pain Assessment: 0-10  Pain Level: 6  Pain Type: Acute pain  Pain Location: Head    Reason for Referral  Joanie Castro was referred for a bedside swallow evaluation to assess the efficiency of her swallow function, identify signs and symptoms of aspiration and make recommendations regarding safe dietary consistencies, effective compensatory strategies, and safe eating environment. Impression  Dysphagia Diagnosis: Swallow function appears grossly intact     Pt presented with mild deficits with oral phase of swallowing as evidenced by mastication process and bolus coordination. Pharyngeal phase presented functional with LE and swallow initiation. SLP recommends pt trial soft and bite-sized diet texture and continue thin liquids. Meds whole with water. Treatment Plan  Requires SLP Intervention: Yes  Duration/Frequency of Treatment: 1x/day, 2-3 days  D/C Recommendations: To be determined       Recommended Diet and Intervention  Diet Solids Recommendation: Dysphagia Soft and Bite-Sized (Dysphagia III)  Liquid Consistency Recommendation:  Thin  Recommended Form of Meds: Whole with water  Recommendations: Dysphagia treatment  Therapeutic thin liquids. Meds whole with water.     MICHI Holbrook  11/10/2021 5:17 PM    Electronically signed by MICHI Holbrook on 11/10/21 at 5:18 PM CST

## 2021-11-10 NOTE — PROGRESS NOTES
Occupational Therapy   Occupational Therapy Initial Assessment  Date: 11/10/2021   Patient Name: Jair Rudd  MRN: [id number]     : 1932    Date of Service: 11/10/2021    Discharge Recommendations:          Assessment   Performance deficits / Impairments: Decreased functional mobility ; Decreased endurance; Decreased cognition; Decreased safe awareness; Decreased ADL status; Decreased balance  Assessment: Will progress as tolerated  Treatment Diagnosis: UTI, recent fall with hematoma  Prognosis: Good  Decision Making: Low Complexity  REQUIRES OT FOLLOW UP: Yes  Activity Tolerance  Activity Tolerance: Patient Tolerated treatment well           Patient Diagnosis(es): There were no encounter diagnoses. has a past medical history of Alzheimer's dementia ([facility] [address] Anxiety, Hypertension, and Repeated falls. has a past surgical history that includes hip surgery and eye surgery. Treatment Diagnosis: UTI, recent fall with hematoma      Restrictions  Restrictions/Precautions  Restrictions/Precautions: Fall Risk  Required Braces or Orthoses?: No    Subjective   General  Chart Reviewed: Yes  Patient assessed for rehabilitation services?: Yes  Additional Pertinent Hx: Alzheimers Dementia  Family / Caregiver Present: Yes  Diagnosis: UTI, recent fall with hematoma  General Comment  Comments: Pt. is a resident at [facility]  Patient Currently in Pain: No  Vital Signs  Patient Currently in Pain: No  Social/Functional History  Social/Functional History  Type of Home: Facility [facility])       Objective   Vision: Within Functional Limits  Hearing: Within functional limits    Orientation  Overall Orientation Status: Impaired  Orientation Level: Oriented to person; Disoriented to place; Disoriented to time; Disoriented to situation        ADL  Feeding: Supervision  Grooming: Minimal assistance  UE Bathing:  Moderate assistance  LE Bathing: Maximum assistance  UE Dressing: Minimal assistance  LE Dressing: Maximum assistance  Toileting: Maximum assistance        Bed mobility  Supine to Sit: Stand by assistance  Sit to Supine: Stand by assistance  Transfers  Stand Step Transfers: Contact guard assistance  Sit to stand: Contact guard assistance  Transfer Comments: Side stepped to [facility] with hand held assist from therapist     Cognition  Overall Cognitive Status: Exceptions  Following Commands:  Follows one step commands with repetition  Attention Span: Difficulty attending to directions  Memory: Decreased short term memory; Decreased long term memory  Safety Judgement: Decreased awareness of need for safety; Decreased awareness of need for assistance  Problem Solving: Assistance required to generate solutions; Assistance required to correct errors made  Insights: Not aware of deficits  Initiation: Requires cues for all  Sequencing: Requires cues for all                 LUE AROM (degrees)  LUE AROM : WFL  RUE AROM (degrees)  RUE AROM : WFL  LUE Strength  Gross LUE Strength: WFL  RUE Strength  Gross RUE Strength: WFL                   Plan   Plan  Times per week: 3-5x/week  Times per day: Daily    G-Code     OutComes Score                                                  AM-PAC Score             Goals  Short term goals  Time Frame for Short term goals: 1 week  Short term goal 1: Supervision with toilet tfers  Short term goal 2: Supervision with clothing mgmt in standing  Short term goal 3: Tolerate standing2 minutes to assist with ADLs  Long term goals  Long term goal 1: Return to PLOF       Therapy Time   Individual Concurrent Group Co-treatment   Time In           Time Out           Minutes                   Abel Leyden, OT

## 2021-11-10 NOTE — ED NOTES
Bed: 08  Expected date:   Expected time:   Means of arrival:   Comments:  Missy Hicks  11/10/21 [id number]

## 2021-11-10 NOTE — ED PROVIDER NOTES
[facility]  eMERGENCY dEPARTMENT eNCOUnter      Pt Name: [place]  MRN: [id number]  Francisgfjay 5/22/1932  Date of evaluation: 11/10/2021  Provider: Romulo Pantoja MD    [address] [place] [address]       Chief Complaint   Patient presents with    Head Injury     Pt had unwitness fall at nursing home, pt has large hematoma to left side of head. HISTORY OF PRESENT ILLNESS   (Location/Symptom, Timing/Onset,Context/Setting, Quality, Duration, Modifying Factors, Severity)  Note limiting factors. [place] is a 80 y.o. female who presents to the emergency department due to fall. Patient has dementia. Recently moved into nursing home because of problem with dementia and frequent falls. Had unwitnessed fall today. Has large hematoma on the back of her head on the left. Some bruising to her face but family said this is from a fall she had a couple weeks ago. Tetanus is up-to-date. Patient does not provide very much history. She has dementia and is pretty similar to her baseline per family. HPI    NursingNotes were reviewed. REVIEW OF SYSTEMS    (2-9 systems for level 4, 10 or more for level 5)     Review of Systems   Unable to perform ROS: Dementia       A complete review of systems was performed and is negative except as noted above in the HPI.        PAST MEDICAL HISTORY     Past Medical History:   Diagnosis Date    Alzheimer's dementia ([address]     Anxiety     Hypertension     Repeated falls          SURGICAL HISTORY       Past Surgical History:   Procedure Laterality Date    EYE SURGERY      HIP SURGERY           CURRENT MEDICATIONS       Discharge Medication List as of 11/13/2021  2:31 PM      CONTINUE these medications which have NOT CHANGED    Details   acetaminophen (TYLENOL) 325 MG tablet Take 2 tablets by mouth every 8 hours as needed for Pain (For temp greater than 100.5 F (38 C)), Disp-120 tablet, R-0DC to SNF      MELATONIN PO Take by mouthHistorical Med      denosumab (PROLIA) 60 MG/ML SOLN SC injection Inject 60 mg into the skin every 6 months Historical Med             ALLERGIES     Morphine and Penicillins    FAMILY HISTORY     History reviewed. No pertinent family history. SOCIAL HISTORY       Social History     Socioeconomic History    Marital status:      Spouse name: None    Number of children: None    Years of education: None    Highest education level: None   Occupational History    None   Tobacco Use    Smoking status: Never Smoker    Smokeless tobacco: Never Used   Substance and Sexual Activity    Alcohol use: No    Drug use: No    Sexual activity: None   Other Topics Concern    None   Social History Narrative    None     Social Determinants of Health     Financial Resource Strain: Low Risk     Difficulty of Paying Living Expenses: Not hard at all   Food Insecurity: No Food Insecurity    Worried About Running Out of Food in the Last Year: Never true    [address] in the Last Year: Never true   Transportation Needs:     Lack of Transportation (Medical): Not on file    Lack of Transportation (Non-Medical):  Not on file   Physical Activity:     Days of Exercise per Week: Not on file    Minutes of Exercise per Session: Not on file   Stress:     Feeling of Stress : Not on file   Social Connections:     Frequency of Communication with Friends and Family: Not on file    Frequency of Social Gatherings with Friends and Family: Not on file    Attends [employer] Services: Not on file    Active Member of Clubs or Organizations: Not on file    Attends Club or Organization Meetings: Not on file    Marital Status: Not on file   Intimate Partner Violence:     Fear of Current or Ex-Partner: Not on file    Emotionally Abused: Not on file    Physically Abused: Not on file    Sexually Abused: Not on file   Housing Stability:     Unable to Pay for Housing in the Last Year: Not on file    Number of [place] in the Last Year: Not on file  Unstable Housing in the Last Year: Not on file       SCREENINGS    [place] Coma Scale  Eye Opening: Spontaneous  Best Verbal Response: Confused  Best Motor Response: Obeys commands  [place] Coma Scale Score: 14        PHYSICAL EXAM    (up to 7 for level 4, 8 or more for level 5)     ED Triage Vitals   BP Temp Temp Source Pulse Resp SpO2 Height Weight   11/10/21 0559 11/10/21 0603 11/10/21 0603 11/10/21 0603 11/10/21 0559 11/10/21 0603 -- 11/10/21 0559   (!) 158/86 98 °F (36.7 °C) Oral 89 18 97 %  133 lb (60.3 kg)       Physical Exam  Vitals reviewed. Constitutional:       General: She is not in acute distress. Appearance: She is well-developed. HENT:      Head: Normocephalic. Comments: Some faint bruising to the face but no tenderness or deformity. Family said this is from a prior fall     Ears:      Comments: No hemotympanum     Nose:      Comments: No tenderness or septal hematoma or deformity  Eyes:      General: No scleral icterus. Extraocular Movements: Extraocular movements intact. Pupils: Pupils are equal, round, and reactive to light. Comments: No hyphema   Neck:      Vascular: No JVD. Cardiovascular:      Rate and Rhythm: Normal rate and regular rhythm. Pulses: Normal pulses. Heart sounds: Normal heart sounds. Pulmonary:      Effort: Pulmonary effort is normal. No respiratory distress. Breath sounds: Normal breath sounds. Chest:      Chest wall: No tenderness. Abdominal:      General: There is no distension. Palpations: Abdomen is soft. Tenderness: There is no abdominal tenderness. There is no guarding or rebound. Musculoskeletal:         General: No tenderness. Cervical back: Neck supple. No tenderness. Comments: No C, T or L-spine tenderness or step-off. Pelvis stable. No tenderness in arms or legs. Neurovascular intact distally all 4 extremities   Skin:     General: Skin is warm and dry.       Capillary Refill: Capillary refill takes less than 2 seconds. Neurological:      General: No focal deficit present. Mental Status: She is alert. Sensory: Sensation is intact. Motor: Motor function is intact. Comments: Confused. Overall baseline per family but they said she is a little bit more confused than normal.  Moving all 4 extremities equally. Limited exam.    Psychiatric:         Behavior: Behavior normal.         DIAGNOSTIC RESULTS     EKG: All EKG's are interpreted by the Emergency Department Physician who either signs or Co-signs this chart in the absence of a cardiologist.    Normal sinus rhythm. Borderline prolonged QT. Artifact. No signs of acute ischemia. RADIOLOGY:   Non-plain film images such as CT, Ultrasound and MRI are read by the radiologist. Aniya Ellisn images are visualized and preliminarily interpreted by the emergency physician with the below findings:        Interpretation per the Radiologist below, if available at the time of this note:    CT HEAD WO CONTRAST   Final Result   No acute intracranial abnormality. No evidence of skull fracture. A large left parietal scalp hematoma. Chronic ischemic and atrophic changes. Signed by Dr Quintana [facility]   Final Result   A minimally displaced and depressed fracture of the   spinous process of vertebra C3 which was not seen in the previous   study dated 10/25/2021. No other fracture or malalignment. Severe chronic degenerative changes and multilevel neural foraminal   and spinal canal stenosis which is similar to the previous study. Signed by Dr Sam Veloz   Final Result   No active cardiopulmonary disease. Signed by Dr Honey Rizvi      XR PELVIS (1-2 VIEWS)   Final Result   1. No visualized fracture or malalignment.     Signed by Dr Kala Murray            ED BEDSIDE ULTRASOUND:   Performed by ED Physician - none    LABS:  Labs Reviewed   CULTURE, URINE - Abnormal; Notable for the following components:       Result Value    Urine Culture, Routine >100,000 CFU/ml (*)     Organism Klebsiella oxytoca (*)     All other components within normal limits    Narrative:     ORDER#: [id number]                          ORDERED BY: John Ruby  SOURCE: Urine Clean Catch                  COLLECTED:  11/10/21 07:05  ANTIBIOTICS AT BELLA.:                      RECEIVED :  11/10/21 07:09   CBC - Abnormal; Notable for the following components:    WBC 13.7 (*)     RBC 3.95 (*)     .5 (*)     MCH 32.4 (*)     MCHC 31.9 (*)     MPV 9.1 (*)     All other components within normal limits   COMPREHENSIVE METABOLIC PANEL W/ REFLEX TO MG FOR LOW K - Abnormal; Notable for the following components:    Sodium 133 (*)     Chloride 97 (*)     Glucose 120 (*)     Calcium 8.6 (*)     All other components within normal limits   URINE RT REFLEX TO CULTURE - Abnormal; Notable for the following components:    Clarity, UA CLOUDY (*)     Blood, Urine TRACE (*)     Protein, UA 30 (*)     Nitrite, Urine POSITIVE (*)     Leukocyte Esterase, Urine LARGE (*)     All other components within normal limits   MICROSCOPIC URINALYSIS - Abnormal; Notable for the following components:    Bacteria, UA 3+ (*)     Crystals, UA NEG (*)     WBC,  (*)     All other components within normal limits   BASIC METABOLIC PANEL W/ REFLEX TO MG FOR LOW K - Abnormal; Notable for the following components:    Sodium 133 (*)     Calcium 8.2 (*)     All other components within normal limits   CBC WITH AUTO DIFFERENTIAL - Abnormal; Notable for the following components:    RBC 3.85 (*)     .3 (*)     MCH 32.5 (*)     MCHC 32.1 (*)     Neutrophils % 66.6 (*)     Lymphocytes % 19.3 (*)     Monocytes % 11.4 (*)     Basophils % 1.2 (*)     Monocytes Absolute 1.10 (*)     All other components within normal limits   CBC WITH AUTO DIFFERENTIAL - Abnormal; Notable for the following components:    RBC 3.86 (*)     .0 (*) MCH 32.4 (*)     MCHC 32.4 (*)     Monocytes % 12.9 (*)     Basophils % 1.3 (*)     Monocytes Absolute 1.10 (*)     All other components within normal limits   BASIC METABOLIC PANEL W/ REFLEX TO MG FOR LOW K - Abnormal; Notable for the following components:    Sodium 133 (*)     All other components within normal limits   COVID-19, RAPID   COVID-19, RAPID   TROPONIN       All other labs were within normal range or not returned as of this dictation. EMERGENCY DEPARTMENT COURSE and DIFFERENTIALDIAGNOSIS/MDM:   Vitals:    Vitals:    11/12/21 1637 11/12/21 2124 11/13/21 0548 11/13/21 0900   BP: 127/79 (!) 153/83 (!) 154/88 137/87   Pulse: 89 86 88 89   Resp: 18 17 17 18   Temp: 97.5 °F (36.4 °C) 97.2 °F (36.2 °C) 97.3 °F (36.3 °C) 97.6 °F (36.4 °C)   TempSrc: Temporal Temporal Temporal Temporal   SpO2: 94% 94% 94%    Weight:       Height:           MDM  Cervical fracture seen. Will place in [place] collar. Discussed with on-call neurosurgeon, Dr. Haylie Lopez, is agreeable with this plan. Also has a UTI. Case discussed with hospitalist, Dr. Deuce Tripathi, who will admit. Patient resting comfortably at this time. CONSULTS:  IP CONSULT TO CASE MANAGEMENT    PROCEDURES:  Unless otherwise notedbelow, none     Procedures    FINAL IMPRESSION     1. Injury of head, initial encounter    2. Fall, initial encounter    3.  Closed displaced fracture of third cervical vertebra, unspecified fracture morphology, initial encounter ([facility] [address]          DISPOSITION/PLAN   DISPOSITION Admitted 11/10/2021 08:27:34 AM      PATIENT REFERRED TO:  @FUP@    DISCHARGE MEDICATIONS:  Discharge Medication List as of 11/13/2021  2:31 PM      START taking these medications    Details   levoFLOXacin (LEVAQUIN) 750 MG tablet Take 1 tablet by mouth daily for 5 days, Disp-5 tablet, R-0Normal                (Please note that portions of this note were completed with a voice recognition program.  Efforts were made to edit the dictations butoccasionally words are mis-transcribed.)    Wesly Mccracken MD (electronically signed)  AttendingEmergency Physician          Wesly Mccracken MD  11/18/21 2017

## 2021-11-10 NOTE — H&P
[facility] Hospitalists      Hospitalist - History & Physical      PCP: Vy Cornejo MD    Date of Admission: 11/10/2021    Date of Service: 11/10/2021    Chief Complaint:  fall    History Of Present Illness: The patient is a 80 y.o. female history as outlined below who presented to [address] [place] [address] ED complaining of unwitnessed fall. Patient is pleasantly confused and gives no history and states she is fine. Patient was placed in [address] [place] [address] and rehab on Monday for frequent falls. Patient experienced unwitnessed fall overnight. Family states patient is slightly more confused than her baseline. ED work-up revealed a minimally displaced and depressed fracture of the spinous process of vertebrae C3, neurosurgery was contacted from the ED advised to place an [place] collar and can follow-up outpatient for this. However, ED work-up also revealed urinary tract infection. Noted increased confusion and urinary tract infection patient is being admitted to hospitalist service. Past Medical History:        Diagnosis Date    Alzheimer's dementia ([facility] [address]     Anxiety     Hypertension     Repeated falls        Past Surgical History:        Procedure Laterality Date    EYE SURGERY      HIP SURGERY         Home Medications:  Prior to Admission medications    Medication Sig Start Date End Date Taking? Authorizing Provider   MELATONIN PO Take by mouth   Yes Historical Provider, MD   acetaminophen (TYLENOL) 325 MG tablet Take 2 tablets by mouth every 8 hours as needed for Pain (For temp greater than 100.5 F (38 C)) 9/12/19  Yes Ashkan Sevilla, DO   denosumab (PROLIA) 60 MG/ML SOLN SC injection Inject 60 mg into the skin once    Historical Provider, MD       Allergies:    Morphine and Penicillins    Social History:    The patient currently lives in [address] [place] [address] and [address] [place] [address] facility  Tobacco:   reports that she has never smoked.  She has never used smokeless tobacco.  Alcohol:   reports no history of alcohol use.  Illicit Drugs: denies    Family History:  History reviewed. No pertinent family history. Review of Systems:   Review of Systems   Unable to perform ROS: Dementia        14 point review of systems is negative except as specifically addressed above. Physical Examination:  BP (!) 158/86   Pulse 89   Temp 98 °F (36.7 °C) (Oral)   Resp 18   Wt 133 lb (60.3 kg)   SpO2 97%   BMI 21.47 kg/m²   Physical Exam  Constitutional:       Appearance: She is not ill-appearing. HENT:      Head:        Comments: Scar to forehead, hematoma noted on back of head     Mouth/Throat:      Mouth: Mucous membranes are moist.      Pharynx: Oropharynx is clear. Eyes:      Pupils: Pupils are equal, round, and reactive to light. Cardiovascular:      Rate and Rhythm: Normal rate and regular rhythm. Pulses: Normal pulses. Heart sounds: Normal heart sounds. Pulmonary:      Effort: Pulmonary effort is normal.      Breath sounds: Normal breath sounds. Abdominal:      General: Bowel sounds are normal. There is no distension. Palpations: Abdomen is soft. Tenderness: There is no abdominal tenderness. There is no guarding. Musculoskeletal:         General: Normal range of motion. Right lower leg: No edema. Left lower leg: No edema. Skin:     General: Skin is warm and dry. Capillary Refill: Capillary refill takes less than 2 seconds. Neurological:      Mental Status: She is alert. Mental status is at baseline. Psychiatric:         Mood and Affect: Mood normal.         Behavior: Behavior normal.          Diagnostic Data:  CBC:  Recent Labs     11/10/21  0607   WBC 13.7*   HGB 12.8   HCT 40.1        BMP:  Recent Labs     11/10/21  0607   *   K 4.6   CL 97*   CO2 25   BUN 12   CREATININE 0.6   CALCIUM 8.6*     Recent Labs     11/10/21  0607   AST 25   ALT 21   BILITOT 0.5   ALKPHOS 58     Coag Panel: No results for input(s): INR, PROTIME, APTT in the last 72 hours.   Cardiac Enzymes:   Recent Labs     11/10/21  0607   TROPONINI 0.02     ABGs:No results found for: PHART, PO2ART, VGI6CYB  Urinalysis:  Lab Results   Component Value Date    NITRU POSITIVE 11/10/2021    WBCUA 355 11/10/2021    BACTERIA 3+ 11/10/2021    RBCUA 4 11/10/2021    BLOODU TRACE 11/10/2021    SPECGRAV 1.007 11/10/2021    GLUCOSEU Negative 11/10/2021     A1C: No results for input(s): LABA1C in the last 72 hours. ABG:No results for input(s): PHART, OPS2DYX, PO2ART, MGT1ECA, BEART, HGBAE, R6ADZEFE, CARBOXHGBART in the last 72 hours. XR PELVIS (1-2 VIEWS)    Result Date: 11/10/2021  XR PELVIS (1-2 VIEWS) 11/10/2021 6:21 AM HISTORY: Fall COMPARISON: 9/6/2019 FINDINGS: Bilateral hip arthroplasties. Normal alignment at the hip joints. The pelvic ring is intact. No discrete soft tissue abnormality. 1. No visualized fracture or malalignment. Signed by Dr Keli Addison    Result Date: 11/10/2021  Examination. CT HEAD WO CONTRAST 11/10/2021 6:43 AM History: The patient fell. Head trauma. DLP: 781 mGycm. The CT scan of the head is performed without intravenous contrast enhancement. The images are acquired in axial plane with subsequent reconstruction in coronal and sagittal planes. The comparison is made with the previous study dated 10/25/2021. There is no evidence of intracranial hemorrhage or hematoma. There is no evidence of mass or midline shift. Moderately dilated ventricles, basal cistern and the cortical sulci are similar to the previous study and represent chronic volume loss. The scattered areas of chronic white matter ischemia in the centrum semiovale bilaterally are noted which are similar to the previous study. The gray-white matter differentiation is maintained. There is bilateral basal ganglia calcification. The images reviewed in bone window show no evidence of a skull fracture. There is a large left parietal scalp hematoma. No underlying bony abnormality.  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 The limited visualized paranasal sinuses and mastoid air cells are clear. No acute intracranial abnormality. No evidence of skull fracture. A large left parietal scalp hematoma. Chronic ischemic and atrophic changes. Signed by Dr Dipti Sheehan    Result Date: 11/10/2021  Examination. CT CERVICAL SPINE WO CONTRAST 11/10/2021 6:43 AM History: The patient fell and complains of neck pain. DLP: 489 mGycm. The CT scan of the cervical spine is performed without intravenous or intrathecal contrast enhancement. The images are acquired in axial plane and subsequent reconstruction in coronal and sagittal planes. The comparison is made with the previous study dated 10/25/2021. 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 There is a minimally displaced and depressed fracture of the spinous process of C3. This was not noted in the previous study. The lamina are not involved. There is a mild anterolisthesis of C3 over C4. There is mild retrolisthesis is of C7 over T1. There is loss of cervical lordosis. The vertebral body heights are grossly normal. There is a focal area of osteopenia involving the posterior inferior aspect of the vertebra C5 which is similar to the previous study and probably represent a Schmorl node. There are severe chronic degenerative changes in the form of anterior and posterior osteophytes, loss of disc heights representing degenerative disc disease, uncinate spurs and facetal arthropathy throughout the cervical spine. There is a resultant multilevel neural foraminal and spinal canal stenosis, similar to the previous study, most pronounced at C5-6 and C6-7. Prevertebral soft tissues are unremarkable. The limited visualized lung apices show chronic inflammatory changes/scarring. Previously seen small nodule is not visualized in this study, probably not included.     A minimally displaced and depressed fracture of the spinous process of vertebra C3 which was not seen in the previous study dated 10/25/2021. No other fracture or malalignment. Severe chronic degenerative changes and multilevel neural foraminal and spinal canal stenosis which is similar to the previous study. Signed by Dr Dom Hicks    Result Date: 11/10/2021  Examination. XR CHEST PORTABLE 11/10/2021 6:21 AM History: The patient fell. A frontal portable supine view of the chest is obtained. The comparison is made with the previous study dated 9/6/2019. The lungs are poorly expanded. There are scarring in the left lower lung with some atelectatic changes similar to the previous study. There is no pleural effusion, pulmonary congestion or pneumothorax. The heart size is in the normal range. Atheromatous changes of thoracic aorta are noted. No acute bony abnormality. An old healed fracture of the left seventh rib was also seen in the previous study. No active cardiopulmonary disease. Signed by Dr Emelyn Monge      Assessment/Plan:  Principal Problem:    UTI (urinary tract infection)   -Admit   -Rocephin every 24 hours   -Monitor I's and O's   -Daily weight   -Gentle IV hydration   -Follow urine culture obtained in ED    Active Problems:    Late onset Alzheimer's dementia with behavioral disturbance ([facility] [address]   -Noted       Frequent falls   -Noted, multiple ED visits related to falls   -Currently lives in skilled facility   -PT OT   -Neurochecks   -Fall precaution       C3 cervical fracture [facility])   -Neurosurgery consulted from ED    -Recommend [place] collar and outpatient follow-up   -PT OT as tolerated   -Easy to chew and swallow diet   -Speech to evaluate    Resolved Problems:    * No resolved hospital problems.  *       Signed:  ALISIA Young - CNP, 11/10/2021 8:28 AM

## 2021-11-11 LAB
ANION GAP SERPL CALCULATED.3IONS-SCNC: 10 MMOL/L (ref 7–19)
BASOPHILS ABSOLUTE: 0.1 K/UL (ref 0–0.2)
BASOPHILS RELATIVE PERCENT: 1.2 % (ref 0–1)
BUN BLDV-MCNC: 10 MG/DL (ref 8–23)
CALCIUM SERPL-MCNC: 8.2 MG/DL (ref 8.8–10.2)
CHLORIDE BLD-SCNC: 101 MMOL/L (ref 98–111)
CO2: 22 MMOL/L (ref 22–29)
CREAT SERPL-MCNC: 0.5 MG/DL (ref 0.5–0.9)
EOSINOPHILS ABSOLUTE: 0.1 K/UL (ref 0–0.6)
EOSINOPHILS RELATIVE PERCENT: 0.9 % (ref 0–5)
GFR AFRICAN AMERICAN: >59
GFR NON-AFRICAN AMERICAN: >60
GLUCOSE BLD-MCNC: 103 MG/DL (ref 74–109)
HCT VFR BLD CALC: 39 % (ref 37–47)
HEMOGLOBIN: 12.5 G/DL (ref 12–16)
IMMATURE GRANULOCYTES #: 0.1 K/UL
LYMPHOCYTES ABSOLUTE: 1.8 K/UL (ref 1.1–4.5)
LYMPHOCYTES RELATIVE PERCENT: 19.3 % (ref 20–40)
MCH RBC QN AUTO: 32.5 PG (ref 27–31)
MCHC RBC AUTO-ENTMCNC: 32.1 G/DL (ref 33–37)
MCV RBC AUTO: 101.3 FL (ref 81–99)
MONOCYTES ABSOLUTE: 1.1 K/UL (ref 0–0.9)
MONOCYTES RELATIVE PERCENT: 11.4 % (ref 0–10)
NEUTROPHILS ABSOLUTE: 6.2 K/UL (ref 1.5–7.5)
NEUTROPHILS RELATIVE PERCENT: 66.6 % (ref 50–65)
PDW BLD-RTO: 14 % (ref 11.5–14.5)
PLATELET # BLD: 233 K/UL (ref 130–400)
PMV BLD AUTO: 9.4 FL (ref 9.4–12.3)
POTASSIUM REFLEX MAGNESIUM: 4.4 MMOL/L (ref 3.5–5)
RBC # BLD: 3.85 M/UL (ref 4.2–5.4)
SODIUM BLD-SCNC: 133 MMOL/L (ref 136–145)
WBC # BLD: 9.3 K/UL (ref 4.8–10.8)

## 2021-11-11 PROCEDURE — 97162 PT EVAL MOD COMPLEX 30 MIN: CPT

## 2021-11-11 PROCEDURE — 6360000002 HC RX W HCPCS: Performed by: NURSE PRACTITIONER

## 2021-11-11 PROCEDURE — 80048 BASIC METABOLIC PNL TOTAL CA: CPT

## 2021-11-11 PROCEDURE — 2580000003 HC RX 258: Performed by: NURSE PRACTITIONER

## 2021-11-11 PROCEDURE — 85025 COMPLETE CBC W/AUTO DIFF WBC: CPT

## 2021-11-11 PROCEDURE — 36415 COLL VENOUS BLD VENIPUNCTURE: CPT

## 2021-11-11 PROCEDURE — 1210000000 HC MED SURG R&B

## 2021-11-11 PROCEDURE — 97116 GAIT TRAINING THERAPY: CPT

## 2021-11-11 RX ADMIN — SODIUM CHLORIDE, PRESERVATIVE FREE 10 ML: 5 INJECTION INTRAVENOUS at 08:30

## 2021-11-11 RX ADMIN — SODIUM CHLORIDE, PRESERVATIVE FREE 1000 MG: 5 INJECTION INTRAVENOUS at 08:25

## 2021-11-11 RX ADMIN — SODIUM CHLORIDE, PRESERVATIVE FREE 10 ML: 5 INJECTION INTRAVENOUS at 23:49

## 2021-11-11 ASSESSMENT — PAIN SCALES - GENERAL: PAINLEVEL_OUTOF10: 0

## 2021-11-11 ASSESSMENT — PAIN - FUNCTIONAL ASSESSMENT: PAIN_FUNCTIONAL_ASSESSMENT: PREVENTS OR INTERFERES SOME ACTIVE ACTIVITIES AND ADLS

## 2021-11-11 NOTE — PROGRESS NOTES
Physical Therapy    Facility/Department: [facility] SURG SERVICES  Initial Assessment    NAME: Pravin Jacobo  : 1932  MRN: [id number]    Date of Service: 2021    Discharge Recommendations:  Continue to assess pending progress, 24 hour supervision or assist, Patient would benefit from continued therapy after discharge        Assessment   Body structures, Functions, Activity limitations: Decreased functional mobility ; Decreased ROM; Decreased strength; Decreased safe awareness; Decreased posture; Decreased balance; Decreased endurance  Assessment: Pt. willing to work with therapy, but hesitant to work with therapy due to the catheter. Pt. a fall risk due to poor balance and should not attempt mobility on her own at this time. Anticipate pt will benefit from cont. PT to decrease impairments. Pt.'s daughter states that pt does not like RW. Pt. had HHA for mobility and tolerated that well. Pt's daughter reports pt lived alone up until recently and wanders at night due to dementia. Pt. unsafe to d/c home alone. Needs 24 hr care. Treatment Diagnosis: impaired gait and mobility  Prognosis: Fair  Decision Making: Medium Complexity  PT Education: Goals; PT Role; Plan of Care; Transfer Training; General Safety; Functional Mobility Training; Gait Training; Precautions  Patient Education: limited learning due to dementia  Barriers to Learning: cognition  REQUIRES PT FOLLOW UP: Yes  Activity Tolerance  Activity Tolerance: Patient limited by cognitive status       Patient Diagnosis(es): There were no encounter diagnoses. has a past medical history of Alzheimer's dementia ([facility] [address] Anxiety, Hypertension, and Repeated falls. has a past surgical history that includes hip surgery and eye surgery.     Restrictions  Restrictions/Precautions  Restrictions/Precautions: Fall Risk  Required Braces or Orthoses?: Yes  Required Braces or Orthoses  Cervical: c-collar (soft collar)  Vision/Hearing  Vision: Within Functional Limits  Hearing: Within functional limits     Subjective  General  Chart Reviewed: Yes  Patient assessed for rehabilitation services?: Yes  Response To Previous Treatment: Not applicable  Family / Caregiver Present: Yes (daughter-she states pt never complains of pain, even when she is in pain.)  Referring Practitioner: ALISIA Light CNP  Referral Date : 11/10/21  Diagnosis: UTI, late onset alzheimer's dementia, frequent falls, C3 fx  Follows Commands: Impaired  Other (Comment): needs repetition, simple v. cues  General Comment  Comments: Dona PINZON PT. Subjective  Subjective: Pt. willing to work with therapy. Concerned about her catheter. Pain Screening  Patient Currently in Pain: No  Pain Assessment  Pain Assessment: 0-10  Functional Pain Assessment: Prevents or interferes some active activities and ADLs  Non-Pharmaceutical Pain Intervention(s): Repositioned; Ambulation/Increased Activity  Response to Pain Intervention: Patient Satisfied  Vital Signs  Patient Currently in Pain: No  Pre Treatment Pain Screening  Intervention List: Patient able to continue with treatment    Orientation  Orientation  Overall Orientation Status: Impaired  Orientation Level: Oriented to person  Social/Functional History  Social/Functional History  Type of Home: [facility]  Additional Comments: pt has been at [facility] x 2 days, but had been living at home prior. Cognition   Cognition  Overall Cognitive Status: Exceptions  Following Commands:  Follows one step commands with repetition  Attention Span: Difficulty attending to directions  Memory: Decreased short term memory; Decreased long term memory  Safety Judgement: Decreased awareness of need for safety; Decreased awareness of need for assistance  Problem Solving: Assistance required to generate solutions; Assistance required to correct errors made  Insights: Not aware of deficits  Initiation: Requires cues for all  Sequencing: Requires cues for all    Objective     Observation/Palpation  Posture: Fair  Observation: soft c collar, liu    AROM RLE (degrees)  RLE AROM: WFL  AROM LLE (degrees)  LLE AROM : WFL  Strength RLE  Strength RLE: WFL  Comment: grossly 3+/5  Strength LLE  Strength LLE: WFL  Comment: grossly 3+/5        Bed mobility  Rolling to Left: Stand by assistance  Supine to Sit: Stand by assistance  Sit to Supine: Unable to assess  Comment: sat on EOB x 5 mins SBA  Transfers  Sit to Stand: Contact guard assistance  Stand to sit: Minimal Assistance  Comment: pt reached forward to adjust socks while seated EOB. Ambulation  Ambulation?: Yes  Ambulation 1  Surface: level tile  Device: Hand-Held Assist  Assistance: Minimal assistance  Quality of Gait: unsteady, narrow BERKLEY  Gait Deviations: Slow Rosalia; Decreased step length; Decreased step height  Distance: 15'     Balance  Posture: Fair  Sitting - Static: Good; -  Sitting - Dynamic: Fair; +  Standing - Static: Fair; -  Standing - Dynamic: Poor; +        Plan   Plan  Times per week: 3-7  Times per day: Daily  Plan weeks: 2  Current Treatment Recommendations: Strengthening, Balance Training, Functional Mobility Training, Transfer Training, Gait Training, Safety Education & Training, Positioning, Equipment Evaluation, Education, & procurement, Patient/Caregiver Education & Training  Plan Comment: cont. PT per POC. Safety Devices  Type of devices: Patient at risk for falls, Gait belt, Nurse notified, Call light within reach, Left in chair, Chair alarm in place    G-Code       OutComes Score                                                  AM-PAC Score             Goals  Short term goals  Time Frame for Short term goals: 2 wks  Short term goal 1: supine to sit indep  Short term goal 2: sit to stand indep  Short term goal 3: amb. 200' with RW SBA.   Patient Goals   Patient goals : go home       Therapy Time   Individual Concurrent Group Co-treatment   Time In           Time Out           Minutes Stephan Oviedo, PT    Electronically signed by Stephan Oviedo PT on 11/11/2021 at 1:17 PM

## 2021-11-11 NOTE — CARE COORDINATION
CEFERINO reached out to Taurus Rudd to discuss options for patient's return. Apparently, pt was admitted to [facility] prior to admission to Hospital on 11/10/2021. Pt had been admitted to [facility] under \"Medicare waiver\" Hx of Dementia/falls. Hope is that patient will be able to return to [facility], [facility] and [facility], facility looking at options.    Electronically signed by Renae Walters RN on 11/11/2021 at 10:35 AM

## 2021-11-11 NOTE — PROGRESS NOTES
[facility] Hospitalists      Patient:  [place]  YOB: 1932  Date of Service: 11/11/2021  MRN: [id number]   Acct: [de-identified]   Primary Care Physician: Re Crain MD  Advance Directive: Full Code  Admit Date: 11/10/2021       Hospital Day: 1  Portions of this note have been copied forward, however, changed to reflect the most current clinical status of this patient. CHIEF COMPLAINT fall    SUBJECTIVE: Patient is pleasantly confused and denies any pain at this time. Daughter at bedside states she stayed with mother last night, states there were multiple times where patient tried to get out of bed but was easily redirected. CUMULATIVE HOSPITAL COURSE:  The patient is a 80 y.o. female history as outlined below who presented to [facility] ED complaining of unwitnessed fall. Patient is pleasantly confused and gives no history and states she is fine. Patient was placed in [address] [place] [address] and rehab on Monday for frequent falls. Patient experienced unwitnessed fall overnight on day of admission. Family stated patient was slightly more confused than her baseline. ED work-up revealed a minimally displaced and depressed fracture of the spinous process of vertebrae C3, neurosurgery was contacted from the ED advised to place an Aspen collar and can follow-up outpatient for this. However, ED work-up also revealed urinary tract infection. Noted increased confusion and urinary tract infection patient was admitted to hospitalist service. Patient initiated on Rocephin at time of admission. Leukocytosis improving. Urine culture currently indicates heavy growth of Klebsiella oxytoca, awaiting sensitivities at this time. PT, OT, and speech therapy consulted. Patient will need skilled nursing facility at discharge. Review of Systems:   Review of Systems   Unable to perform ROS: Dementia       14 point review of systems is negative except as specifically addressed above.       Objective:   VITALS:  BP 104/83   Pulse 75   Temp 97.2 °F (36.2 °C) (Temporal)   Resp 18   Ht 5' 6\" (1.676 m)   Wt 133 lb (60.3 kg)   SpO2 92%   BMI 21.47 kg/m²   24HR INTAKE/OUTPUT:    Intake/Output Summary (Last 24 hours) at 11/11/2021 1233  Last data filed at 11/11/2021 1129  Gross per 24 hour   Intake 240 ml   Output 1800 ml   Net -1560 ml       Physical Exam  Constitutional:       Appearance: She is not ill-appearing. HENT:      Head:        Comments: Scar to forehead, hematoma noted on back of head     Mouth/Throat:      Mouth: Mucous membranes are moist.      Pharynx: Oropharynx is clear. Eyes:      Pupils: Pupils are equal, round, and reactive to light. Neck:      Comments: Aspen collar in place  Cardiovascular:      Rate and Rhythm: Normal rate and regular rhythm. Pulses: Normal pulses. Heart sounds: Normal heart sounds. Pulmonary:      Effort: Pulmonary effort is normal.      Breath sounds: Normal breath sounds. Abdominal:      General: Bowel sounds are normal. There is no distension. Palpations: Abdomen is soft. Tenderness: There is no abdominal tenderness. There is no guarding. Musculoskeletal:         General: Normal range of motion. Right lower leg: No edema. Left lower leg: No edema. Skin:     General: Skin is warm and dry. Capillary Refill: Capillary refill takes less than 2 seconds. Neurological:      Mental Status: She is alert. Mental status is at baseline. She is disoriented. Comments: Pleasantly confused   Psychiatric:         Mood and Affect: Mood normal.         Behavior: Behavior normal.      Comments: Cooperative and follows commands         Medications:      sodium chloride        sodium chloride flush  5-40 mL IntraVENous 2 times per day    cefTRIAXone (ROCEPHIN) IV  1,000 mg IntraVENous Q24H     sodium chloride flush, sodium chloride, ondansetron **OR** ondansetron, polyethylene glycol, acetaminophen **OR** acetaminophen  ADULT DIET;  Dysphagia - Soft and Bite Sized     Lab and other Data:     Recent Labs     11/10/21  0607 11/11/21  0320   WBC 13.7* 9.3   HGB 12.8 12.5    233     Recent Labs     11/10/21  0607 11/11/21  0320   * 133*   K 4.6 4.4   CL 97* 101   CO2 25 22   BUN 12 10   CREATININE 0.6 0.5   GLUCOSE 120* 103     Recent Labs     11/10/21  0607   AST 25   ALT 21   BILITOT 0.5   ALKPHOS 58     Troponin T:   Recent Labs     11/10/21  0607   TROPONINI 0.02     Pro-BNP: No results for input(s): BNP in the last 72 hours. INR: No results for input(s): INR in the last 72 hours. UA:  Recent Labs     11/10/21  0705   COLORU YELLOW   PHUR 7.0   WBCUA 355*   RBCUA 4   BACTERIA 3+*   CLARITYU CLOUDY*   SPECGRAV 1.007   LEUKOCYTESUR LARGE*   UROBILINOGEN 0.2   BILIRUBINUR Negative   BLOODU TRACE*   GLUCOSEU Negative     A1C: No results for input(s): LABA1C in the last 72 hours. ABG:No results for input(s): PHART, QXV2QUV, PO2ART, WIE7KTR, BEART, HGBAE, C6ITCRSC, CARBOXHGBART in the last 72 hours. RAD:   XR PELVIS (1-2 VIEWS)    Result Date: 11/10/2021  1. No visualized fracture or malalignment. Signed by Dr Courtney Flores    Result Date: 11/10/2021  No acute intracranial abnormality. No evidence of skull fracture. A large left parietal scalp hematoma. Chronic ischemic and atrophic changes. Signed by Dr Noe Snowden    Result Date: 11/10/2021  A minimally displaced and depressed fracture of the spinous process of vertebra C3 which was not seen in the previous study dated 10/25/2021. No other fracture or malalignment. Severe chronic degenerative changes and multilevel neural foraminal and spinal canal stenosis which is similar to the previous study. Signed by Dr Josey Haynes    Result Date: 11/10/2021  No active cardiopulmonary disease.  Signed by Dr Gonzalez Mom: Contains abnormal data Culture, Urine  Order: [id number]   Status: Preliminary result     Visible to patient: No (not released)     Next appt: None    Specimen Information: Urine, clean catch         0 Result Notes    Component 11/10/21 0705   Urine Culture, Routine >100,000 CFU/ml Abnormal  P    Organism Klebsiella oxytoca Abnormal  P    Urine Culture, Routine Heavy growth   Sensitivity to follow                Assessment/Plan   Principal Problem:    UTI (urinary tract infection)   -Await sensitivities of urine culture   -Continue Rocephin at this time   -Continue to monitor intake and output    Active Problems:   Late onset Alzheimer's dementia with behavioral disturbance (HCC)/ Frequent falls   -Continue current treatment   -PT OT and speech therapy   -We will need skilled nursing facility placement at discharge    C3 cervical fracture ([facility] [address]   -Aspen collar in place   -Follow-up with neurosurgery outpatient   -Continue PT OT      Antibiotic: Rocephin    DVT Prophylaxis: ALISIA Ye - CNP, 11/11/2021 12:33 PM

## 2021-11-11 NOTE — PLAN OF CARE
Problem: Skin Integrity:  Goal: Will show no infection signs and symptoms  Description: Will show no infection signs and symptoms  Outcome: Ongoing  Goal: Absence of new skin breakdown  Description: Absence of new skin breakdown  Outcome: Ongoing     Problem: Falls - Risk of:  Goal: Will remain free from falls  Description: Will remain free from falls  Outcome: Ongoing  Goal: Absence of physical injury  Description: Absence of physical injury  Outcome: Ongoing   Electronically signed by Matteo Cobian RN on 11/11/2021 at 2:48 AM

## 2021-11-12 LAB
ORGANISM: ABNORMAL
URINE CULTURE, ROUTINE: ABNORMAL
URINE CULTURE, ROUTINE: ABNORMAL

## 2021-11-12 PROCEDURE — 6360000002 HC RX W HCPCS: Performed by: NURSE PRACTITIONER

## 2021-11-12 PROCEDURE — 6360000002 HC RX W HCPCS: Performed by: INTERNAL MEDICINE

## 2021-11-12 PROCEDURE — 2580000003 HC RX 258: Performed by: NURSE PRACTITIONER

## 2021-11-12 PROCEDURE — 97116 GAIT TRAINING THERAPY: CPT

## 2021-11-12 PROCEDURE — 1210000000 HC MED SURG R&B

## 2021-11-12 RX ADMIN — SODIUM CHLORIDE, PRESERVATIVE FREE 1000 MG: 5 INJECTION INTRAVENOUS at 10:14

## 2021-11-12 RX ADMIN — SODIUM CHLORIDE, PRESERVATIVE FREE 10 ML: 5 INJECTION INTRAVENOUS at 19:53

## 2021-11-12 RX ADMIN — ENOXAPARIN SODIUM 40 MG: 100 INJECTION SUBCUTANEOUS at 15:19

## 2021-11-12 RX ADMIN — SODIUM CHLORIDE, PRESERVATIVE FREE 10 ML: 5 INJECTION INTRAVENOUS at 10:16

## 2021-11-12 NOTE — PROGRESS NOTES
[facility] Progress Note    Patient:  [place]  YOB: 1932  Date of Service: 11/12/2021  MRN: [id number]   Acct: [de-identified]   Primary Care Physician: Beth Del Real MD  Advance Directive: Full Code  Admit Date: 11/10/2021       Hospital Day: 2        CHIEF COMPLAINT:     Chief Complaint   Patient presents with    Head Injury     Pt had unwitness fall at nursing home, pt has large hematoma to left side of head. 11/12/2021 1:35 PM  Subjective / Interval History:   11/12/2021  No new complaints. Mental status improved. Patient laying comfortably in bed in no acute distress. No acute changes or acute overnight event reported. Family at bedside. Patient denies any other acute complaints or distress at this time. Review of Systems:   Review of Systems   Unable to perform ROS: Dementia         ADULT DIET;  Dysphagia - Soft and Bite Sized    Intake/Output Summary (Last 24 hours) at 11/12/2021 1335  Last data filed at 11/12/2021 1034  Gross per 24 hour   Intake 365 ml   Output 2550 ml   Net -2185 ml       Medications:   sodium chloride       Current Facility-Administered Medications   Medication Dose Route Frequency Provider Last Rate Last Admin    enoxaparin (LOVENOX) injection 40 mg  40 mg SubCUTAneous Daily Percy Arnold MD        sodium chloride flush 0.9 % injection 5-40 mL  5-40 mL IntraVENous 2 times per day Teena Henson APRN - CNP   10 mL at 11/12/21 1016    sodium chloride flush 0.9 % injection 5-40 mL  5-40 mL IntraVENous PRN Teena Henson APRN - CNP        0.9 % sodium chloride infusion  25 mL IntraVENous PRN ALISIA William - WILLIAN        ondansetron (ZOFRAN-ODT) disintegrating tablet 4 mg  4 mg Oral Q8H PRN Teena Henson APRN - CNP        Or    ondansetron (ZOFRAN) injection 4 mg  4 mg IntraVENous Q6H PRN Teena Henson APRN - WILLIAN        polyethylene glycol (GLYCOLAX) packet 17 g  17 g Oral Daily PRN Owen Michael ALISIA Arroyo - CNP        acetaminophen (TYLENOL) tablet 650 mg  650 mg Oral Q6H PRN Audrey Bryant APRN - CNP   325 mg at 11/10/21 1517    Or    acetaminophen (TYLENOL) suppository 650 mg  650 mg Rectal Q6H PRN ALISIA Serna - CNP        cefTRIAXone (ROCEPHIN) 1,000 mg in sodium chloride (PF) 10 mL IV syringe  1,000 mg IntraVENous Q24H ALISIA Serna - CNP   1,000 mg at 11/12/21 1014         sodium chloride        enoxaparin  40 mg SubCUTAneous Daily    sodium chloride flush  5-40 mL IntraVENous 2 times per day    cefTRIAXone (ROCEPHIN) IV  1,000 mg IntraVENous Q24H     sodium chloride flush, sodium chloride, ondansetron **OR** ondansetron, polyethylene glycol, acetaminophen **OR** acetaminophen  ADULT DIET; Dysphagia - Soft and Bite Sized       Labs:   CBC with DIFF:  Recent Labs     11/10/21  0607 11/11/21  0320   WBC 13.7* 9.3   RBC 3.95* 3.85*   HGB 12.8 12.5   HCT 40.1 39.0   .5* 101.3*   MCH 32.4* 32.5*   MCHC 31.9* 32.1*   RDW 13.9 14.0    233   MPV 9.1* 9.4   NEUTOPHILPCT  --  66.6*   LYMPHOPCT  --  19.3*   MONOPCT  --  11.4*   EOSRELPCT  --  0.9   BASOPCT  --  1.2*   NEUTROABS  --  6.2   LYMPHSABS  --  1.8   MONOSABS  --  1.10*   EOSABS  --  0.10   BASOSABS  --  0.10       CMP/BMP:  Recent Labs     11/10/21  0607 11/11/21  0320   * 133*   K 4.6 4.4   CL 97* 101   CO2 25 22   ANIONGAP 11 10   GLUCOSE 120* 103   BUN 12 10   CREATININE 0.6 0.5   LABGLOM >60 >60   CALCIUM 8.6* 8.2*   PROT 6.9  --    LABALBU 4.0  --    BILITOT 0.5  --    ALKPHOS 58  --    ALT 21  --    AST 25  --          CRP:  No results for input(s): CRP in the last 72 hours. Sed Rate:  No results for input(s): SEDRATE in the last 72 hours.       HgBA1c:  No components found for: HGBA1C  FLP:    Lab Results   Component Value Date    TRIG 137 08/09/2021    HDL 79 08/09/2021    LDLCALC 114 08/09/2021     TSH:    Lab Results   Component Value Date    TSH 2.070 08/09/2021     Troponin T: arthroplasties. Normal alignment at the hip joints. The pelvic ring is intact. No discrete soft tissue abnormality. 1. No visualized fracture or malalignment. Signed by Dr Cait Loaiza    Result Date: 11/10/2021  Examination. CT HEAD WO CONTRAST 11/10/2021 6:43 AM History: The patient fell. Head trauma. DLP: 781 mGycm. The CT scan of the head is performed without intravenous contrast enhancement. The images are acquired in axial plane with subsequent reconstruction in coronal and sagittal planes. The comparison is made with the previous study dated 10/25/2021. There is no evidence of intracranial hemorrhage or hematoma. There is no evidence of mass or midline shift. Moderately dilated ventricles, basal cistern and the cortical sulci are similar to the previous study and represent chronic volume loss. The scattered areas of chronic white matter ischemia in the centrum semiovale bilaterally are noted which are similar to the previous study. The gray-white matter differentiation is maintained. There is bilateral basal ganglia calcification. The images reviewed in bone window show no evidence of a skull fracture. There is a large left parietal scalp hematoma. No underlying bony abnormality. 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 The limited visualized paranasal sinuses and mastoid air cells are clear. No acute intracranial abnormality. No evidence of skull fracture. A large left parietal scalp hematoma. Chronic ischemic and atrophic changes. Signed by Dr Janeth Mcrae    Result Date: 10/26/2021  Examination. CT HEAD WO CONTRAST 10/25/2021 10:09 PM History: Head trauma. Laceration of the forehead. DLP: 7:54 mGycm. The CT scan of the head is performed without intravenous contrast enhancement. The images are acquired in axial plane with subsequent reconstruction in coronal and sagittal planes.  The comparison is made with the previous study dated 10/20/2021. There is no evidence of an intracranial hematoma. There is no evidence of mass or midline shift. Moderately dilated ventricles, basal cistern and the cortical sulci are similar to the previous study and suggest chronic volume loss/atrophy. There are scattered areas of chronic white matter ischemia in the centrum semiovale bilaterally which is similar to the previous study. The gray-white matter differentiation is preserved. The images reviewed in bone window show no evidence of a fracture. There is a left frontal scalp hematoma and laceration. The visualized paranasal sinuses and mastoid air cells are clear. 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 No acute intracranial abnormality. No skull fracture. The above study was initially reviewed and reported by [facility]. I do not find any discrepancies. Signed by Dr Juarez Noble    Result Date: 10/20/2021  CT HEAD WO CONTRAST 10/20/2021 8:33 PM HISTORY: Altered mental status COMPARISON: CT scan dated 10/6/2020 DOSE LENGTH PRODUCT: 756 mGy cm TECHNIQUE: Helical tomographic images of the brain were obtained without the use of intravenous contrast. Automated exposure control was also utilized to decrease patient radiation dose. FINDINGS: Underlying advanced chronic small vessel ischemic change. 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 No new territorial ischemia. No visualized intra-axial or extra-axial hemorrhage. No mass lesion or mass effect identified. No hydrocephalus. The basal cisterns are symmetric. Posterior fossa structures are unremarkable. The included orbits and their contents are unremarkable. The visualized paranasal sinuses, mastoid air cells and middle ear cavities are clear. The visualized osseous structures and overlying soft tissues of the skull and face are unremarkable. 1. No acute intracranial process.  Stable exam. Signed by  Kelsi Ledesma    CT FACIAL BONES WO CONTRAST    Result Date: 10/26/2021  Examination. CT FACIAL BONES WO CONTRAST 10/25/2021 10:13 PM History: Head trauma. Laceration to the forehead. DLP: 556 mGycm. The CT scan of the facial bones is performed without intravenous contrast enhancement. The images are acquired in axial plane and subsequent reconstruction in coronal and sagittal planes. There is no previous study for comparison. There is no evidence of facial bone fracture. The orbits, the maxilla, the mandible, zygomatic arches, nasal bones and anterior nasal spine are intact. There is a left frontal scalp hematoma. Incidentally noted is a large pedunculated torus palatini. No facial bone fracture. The above study was initially reviewed and reported by [facility]. I do not find any discrepancies. Signed by Dr Bhupendra Aguilar    Result Date: 11/10/2021  Examination. CT CERVICAL SPINE WO CONTRAST 11/10/2021 6:43 AM History: The patient fell and complains of neck pain. DLP: 489 mGycm. The CT scan of the cervical spine is performed without intravenous or intrathecal contrast enhancement. The images are acquired in axial plane and subsequent reconstruction in coronal and sagittal planes. The comparison is made with the previous study dated 10/25/2021. 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 There is a minimally displaced and depressed fracture of the spinous process of C3. This was not noted in the previous study. The lamina are not involved. There is a mild anterolisthesis of C3 over C4. There is mild retrolisthesis is of C7 over T1. There is loss of cervical lordosis. The vertebral body heights are grossly normal. There is a focal area of osteopenia involving the posterior inferior aspect of the vertebra C5 which is similar to the previous study and probably represent a Schmorl node.  There are severe chronic degenerative changes in the form of anterior and posterior osteophytes, loss of disc heights representing degenerative disc disease, uncinate spurs and facetal arthropathy throughout the cervical spine. There is a resultant multilevel neural foraminal and spinal canal stenosis, similar to the previous study, most pronounced at C5-6 and C6-7. Prevertebral soft tissues are unremarkable. The limited visualized lung apices show chronic inflammatory changes/scarring. Previously seen small nodule is not visualized in this study, probably not included. A minimally displaced and depressed fracture of the spinous process of vertebra C3 which was not seen in the previous study dated 10/25/2021. No other fracture or malalignment. Severe chronic degenerative changes and multilevel neural foraminal and spinal canal stenosis which is similar to the previous study. Signed by Dr Ruel Petty    Result Date: 10/26/2021  Examination. CT CERVICAL SPINE WO CONTRAST 10/25/2021 10:14 PM History: The patient fell and hit head. Neck pain. DLP: 565 mGycm. The CT scan of the head is performed without intravenous or intrathecal contrast enhancement. The images are acquired in axial plane and subsequent reconstruction in coronal and sagittal planes. The comparison is made with the previous study dated 9/6/2019. There is no evidence of acute fracture or compression. No acute displacement or malalignment. There is loss of cervical lordosis. There is a mild anterolisthesis of C7 over T1. There is mild anterolisthesis of C2 over C3. There are severe chronic degenerative changes in the form of osteophytes, uncinate spurs, loss of disc space heights representing degenerative disc disease and facet arthropathy. There is a resultant bilateral neural foraminal stenosis at C4-5, C5-6 and C6-7. There is moderate spinal stenosis at C5-6 and C6-7.  Prevertebral soft tissues are normal. The limited visualized lungs show a small ill-defined, nodule in the left upper lung medially which may represent a noncalcified granuloma. This is incompletely included in the study. No acute fracture or malalignment. A moderately severe cervical spondylosis. The multilevel prominent disc osteophyte complexes and facetal arthropathy and resultant neural foraminal or spinal canal stenosis as detailed above. A small ill-defined nodule in the left upper lung medially which is incompletely visualized and evaluated. Further evaluation with CT scan of the chest may be obtained. Above study was initially reviewed and reported by [facility]. I do not find any discrepancies. Signed by Dr Sam Veloz    Result Date: 11/10/2021  Examination. XR CHEST PORTABLE 11/10/2021 6:21 AM History: The patient fell. A frontal portable supine view of the chest is obtained. The comparison is made with the previous study dated 9/6/2019. The lungs are poorly expanded. There are scarring in the left lower lung with some atelectatic changes similar to the previous study. There is no pleural effusion, pulmonary congestion or pneumothorax. The heart size is in the normal range. Atheromatous changes of thoracic aorta are noted. No acute bony abnormality. An old healed fracture of the left seventh rib was also seen in the previous study. No active cardiopulmonary disease.  Signed by Dr Honey Rizvi          Objective:   Vitals:   BP (!) 146/72   Pulse 87   Temp 97.7 °F (36.5 °C) (Temporal)   Resp 18   Ht 5' 6\" (1.676 m)   Wt 140 lb 3.2 oz (63.6 kg)   SpO2 95%   BMI 22.63 kg/m²       Patient Vitals for the past 24 hrs:   BP Temp Temp src Pulse Resp SpO2 Weight   11/12/21 0918 (!) 146/72 97.7 °F (36.5 °C) Temporal 87 18 95 % --   11/12/21 0513 (!) 153/86 97.3 °F (36.3 °C) Temporal 86 17 95 % --   11/11/21 2054 (!) 150/82 97.5 °F (36.4 °C) Temporal 84 17 93 % --   11/11/21 1656 138/72 96.6 °F (35.9 °C) Temporal 92 18 92 % --   11/11/21 1508 -- -- -- -- -- -- 140 lb 3.2 oz (63.6 kg)   11/11/21 1350 (!) 142/76 96.6 °F (35.9 °C) Temporal 87 18 95 % --       24HR INTAKE/OUTPUT:      Intake/Output Summary (Last 24 hours) at 11/12/2021 1335  Last data filed at 11/12/2021 1034  Gross per 24 hour   Intake 365 ml   Output 2550 ml   Net -2185 ml       Physical Exam  Vitals and nursing note reviewed. Constitutional:       General: She is not in acute distress. Appearance: Normal appearance. She is not ill-appearing, toxic-appearing or diaphoretic. HENT:      Head: Normocephalic and atraumatic. Comments: Left occipital hematoma-improved. C-collar in place     Right Ear: External ear normal.      Left Ear: External ear normal.      Nose: Nose normal. No congestion or rhinorrhea. Mouth/Throat:      Mouth: Mucous membranes are moist.      Pharynx: Oropharynx is clear. No oropharyngeal exudate or posterior oropharyngeal erythema. Eyes:      General: No scleral icterus. Right eye: No discharge. Left eye: No discharge. Extraocular Movements: Extraocular movements intact. Conjunctiva/sclera: Conjunctivae normal.      Pupils: Pupils are equal, round, and reactive to light. Cardiovascular:      Rate and Rhythm: Normal rate and regular rhythm. Pulses: Normal pulses. Heart sounds: Normal heart sounds. No murmur heard. No friction rub. No gallop. Pulmonary:      Effort: Pulmonary effort is normal. No respiratory distress. Breath sounds: Normal breath sounds. No stridor. No wheezing, rhonchi or rales. Chest:      Chest wall: No tenderness. Abdominal:      General: Bowel sounds are normal. There is no distension. Palpations: Abdomen is soft. Tenderness: There is no abdominal tenderness. There is no guarding or rebound. Musculoskeletal:         General: No swelling, tenderness, deformity or signs of injury. Normal range of motion. Cervical back: Normal range of motion and neck supple. No rigidity or tenderness. No muscular tenderness.       Right lower leg: No edema. Left lower leg: No edema. Skin:     General: Skin is warm and dry. Capillary Refill: Capillary refill takes less than 2 seconds. Coloration: Skin is not jaundiced or pale. Findings: No bruising, erythema, lesion or rash. Neurological:      General: No focal deficit present. Mental Status: She is alert. Mental status is at baseline. Cranial Nerves: No cranial nerve deficit. Sensory: No sensory deficit. Motor: No weakness. Coordination: Coordination normal.   Psychiatric:         Mood and Affect: Mood normal.         Behavior: Behavior normal.           Assessment/plan:         Hospital Problems           Last Modified POA    * (Principal) UTI (urinary tract infection) 11/10/2021 Yes    Late onset Alzheimer's dementia with behavioral disturbance ([facility] [address] 11/10/2021 Yes    Frequent falls 11/10/2021 Yes    C3 cervical fracture ([facility] [address] 11/10/2021 Yes          Principal Problem:    UTI (urinary tract infection)  Active Problems:    Late onset Alzheimer's dementia with behavioral disturbance (HCC)    Frequent falls    C3 cervical fracture (HCC)  Resolved Problems:    * No resolved hospital problems. [facility] course, as quoted below  Ms. Kamilah Rae, an \"89 y.o. female history as outlined below who presented to [facility] ED complaining of unwitnessed fall.  Patient is pleasantly confused and gives no history and states she is fine.  Patient was placed in [facility] on Monday for frequent falls.  Patient experienced unwitnessed fall overnight on day of admission.  Family stated patient was slightly more confused than her baseline.  ED work-up revealed a minimally displaced and depressed fracture of the spinous process of vertebrae C3, neurosurgery was contacted from the ED advised to place an Aspen collar and can follow-up outpatient for this.  However, ED work-up also revealed urinary tract infection.  Noted increased confusion and urinary tract infection patient was admitted to hospitalist service. Patient initiated on Rocephin at time of admission. Leukocytosis improving. Urine culture currently indicates heavy growth of Klebsiella oxytoca, awaiting sensitivities at this time. PT, OT, and speech therapy consulted. Patient will need skilled nursing facility at discharge. \"      Acute cystitis with hematuria  · Ceftriaxone IV initiated on admission  · Urine culture growing Klebsiella oxytocin-sensitivities as noted above. · Continue Ceftriaxone IV       Late onset Alzheimer's dementia with behavioral disturbance   Frequent falls  · Fall precautions  · Continue to monitor  · PT OT     C3 cervical fracture from frequent falls  · C-collar in place  · Continue symptomatic and supportive management    Continue management of other chronic medical conditions - see above and orders. Advance Directive: Full Code    ADULT DIET; Dysphagia - Soft and Bite Sized         Consults Made:   IP CONSULT TO CASE MANAGEMENT    DVT prophylaxis: Enoxaparin      Discharge planning:   Likely discharge to SNF tomorrow (11/13/2021)    Time Spent is 25 mins in the examination, evaluation, counseling and review of medications, assessment and plan.      Electronically signed by   Maureen Potter MD, MPH, MD,   Internal Medicine Hospitalist   11/12/2021 1:35 PM

## 2021-11-13 VITALS
WEIGHT: 140.2 LBS | BODY MASS INDEX: 22.53 KG/M2 | SYSTOLIC BLOOD PRESSURE: 137 MMHG | HEIGHT: 66 IN | HEART RATE: 89 BPM | TEMPERATURE: 97.6 F | DIASTOLIC BLOOD PRESSURE: 87 MMHG | RESPIRATION RATE: 18 BRPM | OXYGEN SATURATION: 94 %

## 2021-11-13 LAB
ANION GAP SERPL CALCULATED.3IONS-SCNC: 11 MMOL/L (ref 7–19)
BASOPHILS ABSOLUTE: 0.1 K/UL (ref 0–0.2)
BASOPHILS RELATIVE PERCENT: 1.3 % (ref 0–1)
BUN BLDV-MCNC: 12 MG/DL (ref 8–23)
CALCIUM SERPL-MCNC: 8.8 MG/DL (ref 8.8–10.2)
CHLORIDE BLD-SCNC: 98 MMOL/L (ref 98–111)
CO2: 24 MMOL/L (ref 22–29)
CREAT SERPL-MCNC: 0.5 MG/DL (ref 0.5–0.9)
EOSINOPHILS ABSOLUTE: 0.1 K/UL (ref 0–0.6)
EOSINOPHILS RELATIVE PERCENT: 1.2 % (ref 0–5)
GFR AFRICAN AMERICAN: >59
GFR NON-AFRICAN AMERICAN: >60
GLUCOSE BLD-MCNC: 100 MG/DL (ref 74–109)
HCT VFR BLD CALC: 38.6 % (ref 37–47)
HEMOGLOBIN: 12.5 G/DL (ref 12–16)
IMMATURE GRANULOCYTES #: 0.1 K/UL
LYMPHOCYTES ABSOLUTE: 1.7 K/UL (ref 1.1–4.5)
LYMPHOCYTES RELATIVE PERCENT: 20.8 % (ref 20–40)
MCH RBC QN AUTO: 32.4 PG (ref 27–31)
MCHC RBC AUTO-ENTMCNC: 32.4 G/DL (ref 33–37)
MCV RBC AUTO: 100 FL (ref 81–99)
MONOCYTES ABSOLUTE: 1.1 K/UL (ref 0–0.9)
MONOCYTES RELATIVE PERCENT: 12.9 % (ref 0–10)
NEUTROPHILS ABSOLUTE: 5.2 K/UL (ref 1.5–7.5)
NEUTROPHILS RELATIVE PERCENT: 63 % (ref 50–65)
PDW BLD-RTO: 13.7 % (ref 11.5–14.5)
PLATELET # BLD: 241 K/UL (ref 130–400)
PMV BLD AUTO: 9.4 FL (ref 9.4–12.3)
POTASSIUM REFLEX MAGNESIUM: 4.1 MMOL/L (ref 3.5–5)
RBC # BLD: 3.86 M/UL (ref 4.2–5.4)
SARS-COV-2, NAAT: NOT DETECTED
SODIUM BLD-SCNC: 133 MMOL/L (ref 136–145)
WBC # BLD: 8.3 K/UL (ref 4.8–10.8)

## 2021-11-13 PROCEDURE — 36415 COLL VENOUS BLD VENIPUNCTURE: CPT

## 2021-11-13 PROCEDURE — 87635 SARS-COV-2 COVID-19 AMP PRB: CPT

## 2021-11-13 PROCEDURE — 6360000002 HC RX W HCPCS: Performed by: NURSE PRACTITIONER

## 2021-11-13 PROCEDURE — 6360000002 HC RX W HCPCS: Performed by: INTERNAL MEDICINE

## 2021-11-13 PROCEDURE — 85025 COMPLETE CBC W/AUTO DIFF WBC: CPT

## 2021-11-13 PROCEDURE — 80048 BASIC METABOLIC PNL TOTAL CA: CPT

## 2021-11-13 PROCEDURE — 2580000003 HC RX 258: Performed by: NURSE PRACTITIONER

## 2021-11-13 RX ORDER — CEFTRIAXONE 1 G/1
1000 INJECTION, POWDER, FOR SOLUTION INTRAMUSCULAR; INTRAVENOUS ONCE
Status: COMPLETED | OUTPATIENT
Start: 2021-11-13 | End: 2021-11-13

## 2021-11-13 RX ORDER — LEVOFLOXACIN 750 MG/1
750 TABLET ORAL DAILY
Qty: 5 TABLET | Refills: 0 | Status: SHIPPED | OUTPATIENT
Start: 2021-11-13 | End: 2021-11-18

## 2021-11-13 RX ADMIN — CEFTRIAXONE 1000 MG: 1 INJECTION, POWDER, FOR SOLUTION INTRAMUSCULAR; INTRAVENOUS at 14:39

## 2021-11-13 RX ADMIN — SODIUM CHLORIDE, PRESERVATIVE FREE 10 ML: 5 INJECTION INTRAVENOUS at 10:23

## 2021-11-13 RX ADMIN — SODIUM CHLORIDE, PRESERVATIVE FREE 1000 MG: 5 INJECTION INTRAVENOUS at 10:22

## 2021-11-13 NOTE — DISCHARGE SUMMARY
[facility], [address]  DEPARTMENT OF HOSPITALIST MEDICINE    DISCHARGE SUMMARY:        [place]  :  1932  MRN:  [id number]    Admit date:  11/10/2021  Discharge date: 2021      Admitting Physician:  Susy Elkins MD    Advance Directive: Full Code    Consults Made:   IP CONSULT TO CASE MANAGEMENT      Primary Care Physician:  Chanelle Mann MD    Discharge Diagnoses:  Principal Problem:    UTI (urinary tract infection)  Active Problems:    Late onset Alzheimer's dementia with behavioral disturbance ([address]    Frequent falls    C3 cervical fracture ([facility] [address]  Resolved Problems:    * No resolved hospital problems. *          Pertinent Labs:  CBC with DIFF:  Recent Labs     21  1130   WBC 9.3 8.3   RBC 3.85* 3.86*   HGB 12.5 12.5   HCT 39.0 38.6   .3* 100.0*   MCH 32.5* 32.4*   MCHC 32.1* 32.4*   RDW 14.0 13.7    241   MPV 9.4 9.4   NEUTOPHILPCT 66.6* 63.0   LYMPHOPCT 19.3* 20.8   MONOPCT 11.4* 12.9*   EOSRELPCT 0.9 1.2   BASOPCT 1.2* 1.3*   NEUTROABS 6.2 5.2   LYMPHSABS 1.8 1.7   MONOSABS 1.10* 1.10*   EOSABS 0.10 0.10   BASOSABS 0.10 0.10       CMP/BMP:  Recent Labs     21  1130   * 133*   K 4.4 4.1    98   CO2 22 24   ANIONGAP 10 11   GLUCOSE 103 100   BUN 10 12   CREATININE 0.5 0.5   LABGLOM >60 >60   CALCIUM 8.2* 8.8         CRP:  No results for input(s): CRP in the last 72 hours. Sed Rate:  No results for input(s): SEDRATE in the last 72 hours. HgBA1c:  No components found for: HGBA1C  FLP:    Lab Results   Component Value Date    TRIG 137 2021    HDL 79 2021    LDLCALC 114 2021     TSH:    Lab Results   Component Value Date    TSH 2.070 2021     Troponin T: No results for input(s): TROPONINI in the last 72 hours. Pro-BNP: No results for input(s): BNP in the last 72 hours. INR: No results for input(s): INR in the last 72 hours.   ABGs: No results found for: PHART, PO2ART, GAR3CQZ  UA:No results for input(s): NITRITE, COLORU, PHUR, LABCAST, WBCUA, RBCUA, MUCUS, TRICHOMONAS, YEAST, BACTERIA, CLARITYU, SPECGRAV, LEUKOCYTESUR, UROBILINOGEN, BILIRUBINUR, BLOODU, GLUCOSEU, AMORPHOUS in the last 72 hours. Invalid input(s): Christelle Standing      Culture Results:  Urine Culture, Routine >100,000 CFU/ml Abnormal     Organism Klebsiella oxytoca Abnormal     Urine Culture, Routine Heavy growth    Resulting Agency [address] [facility]            Susceptibility              Klebsiella oxytoca       BACTERIAL SUSCEPTIBILITY PANEL BY HERBERT       ampicillin >=32 mcg/mL Resistant       aztreonam <=1 mcg/mL Sensitive       ceFAZolin 8 mcg/mL Sensitive       cefepime <=1 mcg/mL Sensitive       cefTRIAXone <=1 mcg/mL Sensitive       ertapenem <=0.5 mcg/mL Sensitive       gentamicin <=1 mcg/mL Sensitive       levofloxacin <=0.12 mcg/mL Sensitive       meropenem <=0.25 mcg/mL Sensitive       nitrofurantoin 32 mcg/mL Sensitive       piperacillin-tazobactam <=4 mcg/mL Sensitive       trimethoprim-sulfamethoxazole <=20 mcg/mL Sensitive                                    Blood Culture Recent: No results for input(s): BC in the last 720 hours. Cultures:   No results for input(s): CULTURE in the last 72 hours. No results for input(s): BC, Mell Fell in the last 72 hours. No results for input(s): CXSURG in the last 72 hours. No results for input(s): MG, PHOS in the last 72 hours. No results for input(s): AST, ALT, ALB, BILITOT, ALKPHOS, ALB in the last 72 hours. Significant Diagnostic Studies:   XR PELVIS (1-2 VIEWS)    Result Date: 11/10/2021  XR PELVIS (1-2 VIEWS) 11/10/2021 6:21 AM HISTORY: Fall COMPARISON: 9/6/2019 FINDINGS: Bilateral hip arthroplasties. Normal alignment at the hip joints. The pelvic ring is intact. No discrete soft tissue abnormality. 1. No visualized fracture or malalignment. Signed by Dr Rema Flynn    Result Date: 11/10/2021  Examination.  CT HEAD WO CONTRAST 11/10/2021 6:43 AM History: The patient fell. Head trauma. DLP: 781 mGycm. The CT scan of the head is performed without intravenous contrast enhancement. The images are acquired in axial plane with subsequent reconstruction in coronal and sagittal planes. The comparison is made with the previous study dated 10/25/2021. There is no evidence of intracranial hemorrhage or hematoma. There is no evidence of mass or midline shift. Moderately dilated ventricles, basal cistern and the cortical sulci are similar to the previous study and represent chronic volume loss. The scattered areas of chronic white matter ischemia in the centrum semiovale bilaterally are noted which are similar to the previous study. The gray-white matter differentiation is maintained. There is bilateral basal ganglia calcification. The images reviewed in bone window show no evidence of a skull fracture. There is a large left parietal scalp hematoma. No underlying bony abnormality. 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 The limited visualized paranasal sinuses and mastoid air cells are clear. No acute intracranial abnormality. No evidence of skull fracture. A large left parietal scalp hematoma. Chronic ischemic and atrophic changes. Signed by Dr Zeke London    Result Date: 11/10/2021  Examination. CT CERVICAL SPINE WO CONTRAST 11/10/2021 6:43 AM History: The patient fell and complains of neck pain. DLP: 489 mGycm. The CT scan of the cervical spine is performed without intravenous or intrathecal contrast enhancement. The images are acquired in axial plane and subsequent reconstruction in coronal and sagittal planes. The comparison is made with the previous study dated 10/25/2021. 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 There is a minimally displaced and depressed fracture of the spinous process of C3. This was not noted in the previous study.  The lamina are not involved. There is a mild anterolisthesis of C3 over C4. There is mild retrolisthesis is of C7 over T1. There is loss of cervical lordosis. The vertebral body heights are grossly normal. There is a focal area of osteopenia involving the posterior inferior aspect of the vertebra C5 which is similar to the previous study and probably represent a Schmorl node. There are severe chronic degenerative changes in the form of anterior and posterior osteophytes, loss of disc heights representing degenerative disc disease, uncinate spurs and facetal arthropathy throughout the cervical spine. There is a resultant multilevel neural foraminal and spinal canal stenosis, similar to the previous study, most pronounced at C5-6 and C6-7. Prevertebral soft tissues are unremarkable. The limited visualized lung apices show chronic inflammatory changes/scarring. Previously seen small nodule is not visualized in this study, probably not included. A minimally displaced and depressed fracture of the spinous process of vertebra C3 which was not seen in the previous study dated 10/25/2021. No other fracture or malalignment. Severe chronic degenerative changes and multilevel neural foraminal and spinal canal stenosis which is similar to the previous study. Signed by Dr Sam Veloz    Result Date: 11/10/2021  Examination. XR CHEST PORTABLE 11/10/2021 6:21 AM History: The patient fell. A frontal portable supine view of the chest is obtained. The comparison is made with the previous study dated 9/6/2019. The lungs are poorly expanded. There are scarring in the left lower lung with some atelectatic changes similar to the previous study. There is no pleural effusion, pulmonary congestion or pneumothorax. The heart size is in the normal range. Atheromatous changes of thoracic aorta are noted. No acute bony abnormality. An old healed fracture of the left seventh rib was also seen in the previous study.     No active cardiopulmonary disease. Signed by Dr Na Machado Course:   Ms. Kamilah Rae, an 80 y.o. female history as outlined below who presented to [facility] ED complaining of unwitnessed fall.    Patient was placed in [place] [facility] on recently for frequent falls.    Patient reportedly experienced unwitnessed fall overnight on day of admission.    Family stated patient was slightly more confused than her baseline.      Initial work-up in ED ED revealed a minimally displaced and depressed fracture of the spinous process of vertebrae C3. Neurosurgery was contacted from the ED advised to place an Aspen collar and can follow-up outpatient for this. However, ED work-up also revealed urinary tract infection.  Given increased confusion and urinary tract infection patient was admitted to hospitalist service (11/10/2021), for further work-up and management. Patient initiated on Rocephin at time of admission.    Leukocytosis improved, now resolved.  Urine culture  Grew a heavy growth of Klebsiella oxytoca. Further hospital course as per the problem list below        Acute cystitis with hematuria  · Ceftriaxone IV initiated on admission  · Urine culture growing Klebsiella oxytocin-sensitivities as noted above. · Continued Ceftriaxone IV, based on sensitivities above  · Levofloxacin, to complete antibiotic course.        Late onset Alzheimer's dementia with behavioral disturbance   Frequent falls  · Fall precautions  · Continued to monitor  · PT OT     C3 cervical fracture from frequent falls  · C-collar in place  · Continued symptomatic and supportive management         Continued management of other chronic medical conditions     Physical Exam:  Vital Signs: /87   Pulse 89   Temp 97.6 °F (36.4 °C) (Temporal)   Resp 18   Ht 5' 6\" (1.676 m)   Wt 140 lb 3.2 oz (63.6 kg)   SpO2 94%   BMI 22.63 kg/m²   Physical Exam  Vitals and nursing note reviewed.    Constitutional:       General: She is not in acute distress. Appearance: Normal appearance. She is not ill-appearing, toxic-appearing or diaphoretic. HENT:      Head: Normocephalic and atraumatic. Right Ear: External ear normal.      Left Ear: External ear normal.      Nose: Nose normal. No congestion or rhinorrhea. Mouth/Throat:      Mouth: Mucous membranes are moist.      Pharynx: Oropharynx is clear. No oropharyngeal exudate or posterior oropharyngeal erythema. Eyes:      General: No scleral icterus. Right eye: No discharge. Left eye: No discharge. Extraocular Movements: Extraocular movements intact. Conjunctiva/sclera: Conjunctivae normal.      Pupils: Pupils are equal, round, and reactive to light. Cardiovascular:      Rate and Rhythm: Normal rate and regular rhythm. Pulses: Normal pulses. Heart sounds: Normal heart sounds. No murmur heard. No friction rub. No gallop. Pulmonary:      Effort: Pulmonary effort is normal. No respiratory distress. Breath sounds: Normal breath sounds. No stridor. No wheezing, rhonchi or rales. Chest:      Chest wall: No tenderness. Abdominal:      General: Bowel sounds are normal. There is no distension. Palpations: Abdomen is soft. Tenderness: There is no abdominal tenderness. There is no guarding or rebound. Musculoskeletal:         General: No swelling, tenderness, deformity or signs of injury. Normal range of motion. Cervical back: Normal range of motion and neck supple. No rigidity or tenderness. No muscular tenderness. Right lower leg: No edema. Left lower leg: No edema. Skin:     General: Skin is warm and dry. Capillary Refill: Capillary refill takes less than 2 seconds. Coloration: Skin is not jaundiced or pale. Findings: No bruising, erythema, lesion or rash. Neurological:      General: No focal deficit present. Mental Status: She is alert. Mental status is at baseline. Cranial Nerves:  No cranial nerve deficit. Sensory: No sensory deficit. Motor: No weakness. Coordination: Coordination normal.   Psychiatric:         Mood and Affect: Mood normal.         Behavior: Behavior normal.           Discharge Medications:        Medication List      START taking these medications    levoFLOXacin 750 MG tablet  Commonly known as: LEVAQUIN  Take 1 tablet by mouth daily for 5 days        CONTINUE taking these medications    acetaminophen 325 MG tablet  Commonly known as: TYLENOL  Take 2 tablets by mouth every 8 hours as needed for Pain (For temp greater than 100.5 F (38 C))     denosumab 60 MG/ML Soln SC injection  Commonly known as: PROLIA     MELATONIN PO           Where to Get Your Medications      These medications were sent to [facility], [address] [place] [address] [address] , [address] [address]    Phone: [phone]   · levoFLOXacin 750 MG tablet           Discharge Instructions: Follow up with Obdulio Mclaughlin MD in 7 days. Take medications as directed. Resume activity as tolerated. Diet: ADULT DIET; Dysphagia - Soft and Bite Sized        DISCHARGE STATUS:    Condition: Fair  Disposition: Patient is medically stable and will be discharged SNF ([place] [address] [facility])    Extended Emergency Contact Information  Primary Emergency Contact: Janelle Brown  Address: [address] Phone: [phone]  Mobile Phone: [phone]  Relation: Child  Secondary Emergency Contact: [address]  Address: [address] Phone: [phone]  Mobile Phone: [phone]  Relation: Child       Time Spent on discharge is  36 mins in the examination, evaluation, counseling and review of medications and discharge plan.      Electronically signed by   Ha Ivan MD, MPH, MD,   Internal Medicine Hospitalist 11/13/2021 1:17 PM      Thank you Fernando Johnson MD for the opportunity to be involved in this patient's care. If you have any questions or concerns please feel free to contact me at [phone]        EMR Dragon/Transcription disclaimer:   Much of this encounter note is an electronic transcription/translation of spoken language to printed text.  The electronic translation of spoken language may permit erroneous, or at times, nonsensical words or phrases to be inadvertently transcribed; although attempts have made to review the note for such errors, some may still exist.

## 2021-11-13 NOTE — PROGRESS NOTES
Physician Progress Note      Ivan Fatima  CSN #:                  [id number]  :                       1932  ADMIT DATE:       11/10/2021 5:57 AM  DISCH DATE:  RESPONDING  PROVIDER #:        Jose G Hastings MD          QUERY TEXT:    Pt admitted with a UTI. Pt noted to have more confusion than baseline and   leukocytosis. If possible, please document in the progress notes and discharge   summary if you are evaluating and /or treating any of the following: The medical record reflects the following:  Risk Factors: 80year old female nursing home resident, UTI  Clinical Indicators: more confusion than baseline, WBC 13.7  Treatment: CT head, urine culture, Rocephin IV, neuro checks, bed alarm    Thank you,  Jeremie Bartlett RN, CCDS  [phone]  Options provided:  -- Sepsis, present on admission  -- UTI without sepsis  -- Other - I will add my own diagnosis  -- Disagree - Not applicable / Not valid  -- Disagree - Clinically unable to determine / Unknown  -- Refer to Clinical Documentation Reviewer    PROVIDER RESPONSE TEXT:    This patient has a UTI without sepsis.     Query created by: Bailee Marquez on 11/10/2021 3:13 PM      Electronically signed by:  Jose G Hastings MD 2021 2:26 PM

## 2021-11-13 NOTE — PROGRESS NOTES
Called an gave report to Lanette Antonio at [facility].

## 2021-11-15 ENCOUNTER — CARE COORDINATION (OUTPATIENT)
Dept: CASE MANAGEMENT | Age: 86
End: 2021-11-15

## 2021-11-15 NOTE — CARE COORDINATION
[facility] Transitions Initial Follow Up Call    Call within 2 business days of discharge: Yes    Patient: Daniel Seymour Patient : 1932   MRN: [id number]  Reason for Admission: UTI, Dementia  Discharge Date: 21 RARS: Readmission Risk Score: 11.1 ( )      Last Discharge [facility]       Complaint Diagnosis Description Type Department Provider    11/10/21 Head Injury  ED to Hosp-Admission (Discharged) (ADMITTED) [facility] 5 SURG Alisa Merino MD; Isabell Collins . .. Spoke with: N/A    Facility: [address] [place] [address]    Non-face-to-face services provided:  Reviewed encounter information for continuity of care prior to follow up Care Transitions phone call - chart notes, consults, progress notes, test results, med list, appointments, AVS, other information. Care Transitions 24 Hour Call    Do you have all of your prescriptions and are they filled?: Yes  Post Acute Services: Home Health  Care Transitions Interventions       Attempted to make contact with patient/caregiver for an initial transitions of care follow up call post discharge without success. Unable to leave a message regarding intent of call and call back information. Call was forwarded to an unidentifiable voice messaging system (mobile voice mail or telephone answering machine). CTN will follow up again at a later time. Any previously scheduled hospital follow up appointments noted below.  '    Follow Up  No future appointments.     Roberto Gr RN

## 2021-11-16 ENCOUNTER — CARE COORDINATION (OUTPATIENT)
Dept: CASE MANAGEMENT | Age: 86
End: 2021-11-16

## 2021-11-16 NOTE — CARE COORDINATION
[facility] Transitions Initial Follow Up Call    Call within 2 business days of discharge: Yes    Patient: Volodymyr Malave Patient : 1932   MRN: [id number]  Reason for Admission: UTI, Dementia, C3 fx  Discharge Date: 21 RARS: Readmission Risk Score: 11.1 ( )      Last Discharge [address] [address]       Complaint Diagnosis Description Type Department Provider    11/10/21 Head Injury  ED to Hosp-Admission (Discharged) (ADMITTED) [facility] 5 SURG Tayo Liu MD; Evelina King . .. Spoke with: Daughter, Tyrone Thibodeaux: [address]    Non-face-to-face services provided:  Reviewed encounter information for continuity of care prior to follow up Care Transitions phone call - chart notes, consults, progress notes, test results, med list, appointments, AVS, other information. Care Transitions 24 Hour Call    Do you have all of your prescriptions and are they filled?: Yes  Post Acute Services:  [facility] Transitions Interventions       Placed a call to the number listed for patient for an initial follow up call for Care Transitions Coordination following discharge from the hospital. Spoke with patient regarding hospitalization, discharge and status thus far. Daughter reported that patient did discharge back to [facility]. She said she did  the antibiotic and took it to the staff there. She said she does not have a follow up appointment as she is being followed by the provider there. Will end CTC episode at this time. Follow Up  No future appointments.     Yolanda Bhatia RN

## 2021-11-23 NOTE — PROGRESS NOTES
Physician Progress Note      Lissy Graham  CSN #:                  [id number]  :                       1932  ADMIT DATE:       11/10/2021 5:57 AM  DISCH DATE:        2021 4:16 PM  RESPONDING  PROVIDER #:        Dionicio Sousa MD          QUERY TEXT:    Pt admitted with unwitnessed fall and increased confusion. Pt noted to have a   UTI and a hx of dementia. If possible, please document in the progress notes   and discharge summary if you are evaluating and / or treating any of the   following: The medical record reflects the following:  Risk Factors: UTI, dementia  Clinical Indicators: presented after having an unwitnessed fall, noted   increased confusion per H/P; CT head-no acute intracranial abnormality,   urinalysis with large leuk esterase/+ nitrites/3+ bacteria/355 WBC; has late   onset Alzheimer's dementia; progress notes  documented that \"mental   status improved\"  Treatment: CT head, urine cultures, Rocephin IV, neuro checks, bed alarm    Thank you,  Hali Ashby, CCDS  [phone]  Options provided:  -- Metabolic encephalopathy  -- Exacerbation of dementia  -- Acute confusional state due to UTI  -- Delirium due to, Please specify cause. -- Delirium  -- Other - I will add my own diagnosis  -- Disagree - Not applicable / Not valid  -- Disagree - Clinically unable to determine / Unknown  -- Refer to Clinical Documentation Reviewer    PROVIDER RESPONSE TEXT:    This patient has acute confusional state due to UTI.     Query created by: Raven Herman on 2021 8:03 AM      Electronically signed by:  Dionicio Sousa MD 2021 6:55 PM

## 2021-12-10 PROBLEM — N39.0 UTI (URINARY TRACT INFECTION): Status: RESOLVED | Noted: 2021-11-10 | Resolved: 2021-12-10

## 2022-02-14 RX ORDER — ACETAMINOPHEN 325 MG/1
650 TABLET ORAL
OUTPATIENT
Start: 2022-02-14

## 2022-02-14 RX ORDER — ONDANSETRON 2 MG/ML
8 INJECTION INTRAMUSCULAR; INTRAVENOUS
OUTPATIENT
Start: 2022-02-14

## 2022-02-14 RX ORDER — EPINEPHRINE 1 MG/ML
0.3 INJECTION, SOLUTION, CONCENTRATE INTRAVENOUS PRN
OUTPATIENT
Start: 2022-02-14

## 2022-02-14 RX ORDER — ALBUTEROL SULFATE 90 UG/1
4 AEROSOL, METERED RESPIRATORY (INHALATION) PRN
OUTPATIENT
Start: 2022-02-14

## 2022-02-14 RX ORDER — SODIUM CHLORIDE 9 MG/ML
INJECTION, SOLUTION INTRAVENOUS CONTINUOUS
OUTPATIENT
Start: 2022-02-14

## 2022-02-14 RX ORDER — DIPHENHYDRAMINE HYDROCHLORIDE 50 MG/ML
50 INJECTION INTRAMUSCULAR; INTRAVENOUS
OUTPATIENT
Start: 2022-02-14

## 2022-04-05 NOTE — PROGRESS NOTES
Per Radha Graves at [facility] cancel order for prolia

## 2022-04-15 ENCOUNTER — APPOINTMENT (OUTPATIENT)
Dept: GENERAL RADIOLOGY | Facility: HOSPITAL | Age: 87
End: 2022-04-15

## 2022-04-15 ENCOUNTER — HOSPITAL ENCOUNTER (EMERGENCY)
Facility: HOSPITAL | Age: 87
Discharge: NURSING, REHAB OR LONG-TERM CARE | End: 2022-04-16
Admitting: EMERGENCY MEDICINE

## 2022-04-15 ENCOUNTER — APPOINTMENT (OUTPATIENT)
Dept: CT IMAGING | Facility: HOSPITAL | Age: 87
End: 2022-04-15

## 2022-04-15 DIAGNOSIS — S62.636B OPEN DISPLACED FRACTURE OF DISTAL PHALANX OF RIGHT LITTLE FINGER, INITIAL ENCOUNTER: ICD-10-CM

## 2022-04-15 DIAGNOSIS — S62.632B OPEN DISPLACED FRACTURE OF DISTAL PHALANX OF RIGHT MIDDLE FINGER, INITIAL ENCOUNTER: ICD-10-CM

## 2022-04-15 DIAGNOSIS — S62.634B OPEN DISPLACED FRACTURE OF DISTAL PHALANX OF RIGHT RING FINGER, INITIAL ENCOUNTER: ICD-10-CM

## 2022-04-15 DIAGNOSIS — S61.212A LACERATION OF RIGHT MIDDLE FINGER WITHOUT FOREIGN BODY WITHOUT DAMAGE TO NAIL, INITIAL ENCOUNTER: ICD-10-CM
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
## 2022-04-15 DIAGNOSIS — S61.214A LACERATION OF RIGHT RING FINGER WITHOUT FOREIGN BODY WITHOUT DAMAGE TO NAIL, INITIAL ENCOUNTER: ICD-10-CM

## 2022-04-15 DIAGNOSIS — S61.216A LACERATION OF RIGHT LITTLE FINGER WITHOUT FOREIGN BODY WITHOUT DAMAGE TO NAIL, INITIAL ENCOUNTER: Primary | ICD-10-CM

## 2022-04-15 PROCEDURE — 70450 CT HEAD/BRAIN W/O DYE: CPT

## 2022-04-15 PROCEDURE — 99284 EMERGENCY DEPT VISIT MOD MDM: CPT

## 2022-04-15 PROCEDURE — 73130 X-RAY EXAM OF HAND: CPT

## 2022-04-15 PROCEDURE — 72125 CT NECK SPINE W/O DYE: CPT

## 2022-04-15 PROCEDURE — 72128 CT CHEST SPINE W/O DYE: CPT

## 2022-04-15 PROCEDURE — 96365 THER/PROPH/DIAG IV INF INIT: CPT

## 2022-04-15 PROCEDURE — 25010000002 CEFAZOLIN PER 500 MG: Performed by: PHYSICIAN ASSISTANT

## 2022-04-15 PROCEDURE — 72131 CT LUMBAR SPINE W/O DYE: CPT

## 2022-04-15 RX ORDER — ACETAMINOPHEN 325 MG/1
650 TABLET ORAL EVERY 6 HOURS PRN
COMMUNITY

## 2022-04-15 RX ORDER — CHOLECALCIFEROL (VITAMIN D3) 25 MCG
TABLET ORAL
COMMUNITY
End: 2023-04-03

## 2022-04-15 RX ORDER — CEFAZOLIN SODIUM IN 0.9 % NACL 3 G/100 ML
3 INTRAVENOUS SOLUTION, PIGGYBACK (ML) INTRAVENOUS ONCE
Status: COMPLETED | OUTPATIENT
Start: 2022-04-15 | End: 2022-04-16

## 2022-04-15 RX ORDER — ERGOCALCIFEROL 1.25 MG/1
1250 CAPSULE ORAL 2 TIMES WEEKLY
COMMUNITY

## 2022-04-15 RX ORDER — IBANDRONATE SODIUM 150 MG/1
150 TABLET, FILM COATED ORAL
COMMUNITY
End: 2023-04-03

## 2022-04-15 RX ORDER — ONDANSETRON 2 MG/ML
4 INJECTION INTRAMUSCULAR; INTRAVENOUS ONCE
Status: DISCONTINUED | OUTPATIENT
Start: 2022-04-15 | End: 2022-04-16 | Stop reason: HOSPADM

## 2022-04-15 RX ORDER — PHENOL 1.4 %
600 AEROSOL, SPRAY (ML) MUCOUS MEMBRANE 2 TIMES DAILY WITH MEALS
COMMUNITY

## 2022-04-15 RX ORDER — SODIUM CHLORIDE 0.9 % (FLUSH) 0.9 %
10 SYRINGE (ML) INJECTION AS NEEDED
Status: DISCONTINUED | OUTPATIENT
Start: 2022-04-15 | End: 2022-04-16 | Stop reason: HOSPADM

## 2022-04-15 RX ORDER — CIPROFLOXACIN 500 MG/1
500 TABLET, FILM COATED ORAL 2 TIMES DAILY
COMMUNITY

## 2022-04-15 RX ORDER — TRAZODONE HYDROCHLORIDE 50 MG/1
50 TABLET ORAL NIGHTLY
COMMUNITY

## 2022-04-15 RX ORDER — DENOSUMAB 60 MG/ML
INJECTION SUBCUTANEOUS ONCE
COMMUNITY

## 2022-04-15 RX ADMIN — CEFAZOLIN SODIUM 3 G: 10 INJECTION, POWDER, FOR SOLUTION INTRAVENOUS at 23:53

## 2022-04-16 VITALS
HEIGHT: 66 IN | OXYGEN SATURATION: 99 % | WEIGHT: 130 LBS | RESPIRATION RATE: 18 BRPM | BODY MASS INDEX: 20.89 KG/M2 | HEART RATE: 85 BPM | SYSTOLIC BLOOD PRESSURE: 151 MMHG | DIASTOLIC BLOOD PRESSURE: 93 MMHG

## 2022-04-16 PROCEDURE — 0 LIDOCAINE 1 % SOLUTION: Performed by: PHYSICIAN ASSISTANT

## 2022-04-16 RX ORDER — CEPHALEXIN 500 MG/1
500 CAPSULE ORAL 2 TIMES DAILY
Qty: 14 CAPSULE | Refills: 0 | Status: SHIPPED | OUTPATIENT
Start: 2022-04-16 | End: 2022-04-23

## 2022-04-16 RX ORDER — ACETAMINOPHEN 500 MG
1000 TABLET ORAL ONCE
Status: COMPLETED | OUTPATIENT
Start: 2022-04-16 | End: 2022-04-16

## 2022-04-16 RX ORDER — LIDOCAINE HYDROCHLORIDE 10 MG/ML
10 INJECTION, SOLUTION INFILTRATION; PERINEURAL ONCE
Status: COMPLETED | OUTPATIENT
Start: 2022-04-16 | End: 2022-04-16

## 2022-04-16 RX ADMIN — ACETAMINOPHEN 1000 MG: 500 TABLET, FILM COATED ORAL at 01:28

## 2022-04-16 RX ADMIN — LIDOCAINE HYDROCHLORIDE 10 ML: 10 INJECTION, SOLUTION INFILTRATION; PERINEURAL at 01:00

## 2022-04-26 LAB — CALCIUM SERPL-MCNC: 9.5 MG/DL (ref 8.8–10.2)

## 2022-05-18 ENCOUNTER — DOCUMENTATION (OUTPATIENT)
Dept: CT IMAGING | Facility: HOSPITAL | Age: 87
End: 2022-05-18

## 2022-09-21 ENCOUNTER — APPOINTMENT (OUTPATIENT)
Dept: CT IMAGING | Age: 87
End: 2022-09-21
Payer: MEDICARE

## 2022-09-21 ENCOUNTER — HOSPITAL ENCOUNTER (EMERGENCY)
Age: 87
Discharge: HOME | End: 2022-09-21
Attending: EMERGENCY MEDICINE
Payer: MEDICARE

## 2022-09-21 VITALS
SYSTOLIC BLOOD PRESSURE: 133 MMHG | TEMPERATURE: 98.1 F | HEART RATE: 80 BPM | DIASTOLIC BLOOD PRESSURE: 69 MMHG | OXYGEN SATURATION: 96 % | RESPIRATION RATE: 18 BRPM
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
## 2022-09-21 DIAGNOSIS — S09.90XA CLOSED HEAD INJURY, INITIAL ENCOUNTER: Primary | ICD-10-CM

## 2022-09-21 LAB
ALBUMIN SERPL-MCNC: 4.3 G/DL (ref 3.5–5.2)
ALP BLD-CCNC: 140 U/L (ref 35–104)
ALT SERPL-CCNC: 9 U/L (ref 5–33)
ANION GAP SERPL CALCULATED.3IONS-SCNC: 11 MMOL/L (ref 7–19)
AST SERPL-CCNC: 23 U/L (ref 5–32)
BACTERIA: NEGATIVE /HPF
BASOPHILS ABSOLUTE: 0.1 K/UL (ref 0–0.2)
BASOPHILS RELATIVE PERCENT: 0.8 % (ref 0–1)
BILIRUB SERPL-MCNC: <0.2 MG/DL (ref 0.2–1.2)
BILIRUBIN URINE: NEGATIVE
BLOOD, URINE: NEGATIVE
BUN BLDV-MCNC: 18 MG/DL (ref 8–23)
CALCIUM SERPL-MCNC: 9.6 MG/DL (ref 8.8–10.2)
CHLORIDE BLD-SCNC: 102 MMOL/L (ref 98–111)
CLARITY: CLEAR
CO2: 26 MMOL/L (ref 22–29)
COLOR: YELLOW
CREAT SERPL-MCNC: 1.3 MG/DL (ref 0.5–0.9)
CRYSTALS, UA: ABNORMAL /HPF
EOSINOPHILS ABSOLUTE: 0 K/UL (ref 0–0.6)
EOSINOPHILS RELATIVE PERCENT: 0.2 % (ref 0–5)
EPITHELIAL CELLS, UA: 0 /HPF (ref 0–5)
GFR AFRICAN AMERICAN: 47
GFR NON-AFRICAN AMERICAN: 38
GLUCOSE BLD-MCNC: 93 MG/DL (ref 74–109)
GLUCOSE URINE: NEGATIVE MG/DL
HCT VFR BLD CALC: 37.6 % (ref 37–47)
HEMOGLOBIN: 12.3 G/DL (ref 12–16)
HYALINE CASTS: 2 /HPF (ref 0–8)
IMMATURE GRANULOCYTES #: 0.2 K/UL
KETONES, URINE: ABNORMAL MG/DL
LEUKOCYTE ESTERASE, URINE: ABNORMAL
LYMPHOCYTES ABSOLUTE: 1.8 K/UL (ref 1.1–4.5)
LYMPHOCYTES RELATIVE PERCENT: 15.8 % (ref 20–40)
MCH RBC QN AUTO: 32.3 PG (ref 27–31)
MCHC RBC AUTO-ENTMCNC: 32.7 G/DL (ref 33–37)
MCV RBC AUTO: 98.7 FL (ref 81–99)
MONOCYTES ABSOLUTE: 1.2 K/UL (ref 0–0.9)
MONOCYTES RELATIVE PERCENT: 11 % (ref 0–10)
NEUTROPHILS ABSOLUTE: 8 K/UL (ref 1.5–7.5)
NEUTROPHILS RELATIVE PERCENT: 70.9 % (ref 50–65)
NITRITE, URINE: NEGATIVE
PDW BLD-RTO: 14.1 % (ref 11.5–14.5)
PH UA: 7 (ref 5–8)
PLATELET # BLD: 291 K/UL (ref 130–400)
PMV BLD AUTO: 9.2 FL (ref 9.4–12.3)
POTASSIUM SERPL-SCNC: 4.3 MMOL/L (ref 3.5–5)
PROTEIN UA: NEGATIVE MG/DL
RBC # BLD: 3.81 M/UL (ref 4.2–5.4)
RBC UA: 3 /HPF (ref 0–4)
SODIUM BLD-SCNC: 139 MMOL/L (ref 136–145)
SPECIFIC GRAVITY UA: 1.01 (ref 1–1.03)
TOTAL PROTEIN: 7.3 G/DL (ref 6.6–8.7)
UROBILINOGEN, URINE: 0.2 E.U./DL
WBC # BLD: 11.2 K/UL (ref 4.8–10.8)
WBC UA: 27 /HPF (ref 0–5)

## 2022-09-21 PROCEDURE — 80053 COMPREHEN METABOLIC PANEL: CPT

## 2022-09-21 PROCEDURE — 99284 EMERGENCY DEPT VISIT MOD MDM: CPT

## 2022-09-21 PROCEDURE — 36415 COLL VENOUS BLD VENIPUNCTURE: CPT

## 2022-09-21 PROCEDURE — 85025 COMPLETE CBC W/AUTO DIFF WBC: CPT

## 2022-09-21 PROCEDURE — 72125 CT NECK SPINE W/O DYE: CPT

## 2022-09-21 PROCEDURE — 87086 URINE CULTURE/COLONY COUNT: CPT

## 2022-09-21 PROCEDURE — 81001 URINALYSIS AUTO W/SCOPE: CPT

## 2022-09-21 PROCEDURE — 70450 CT HEAD/BRAIN W/O DYE: CPT

## 2022-09-21 RX ORDER — 0.9 % SODIUM CHLORIDE 0.9 %
1000 INTRAVENOUS SOLUTION INTRAVENOUS ONCE
Status: DISCONTINUED | OUTPATIENT
Start: 2022-09-21 | End: 2022-09-21 | Stop reason: HOSPADM

## 2022-09-21 ASSESSMENT — ENCOUNTER SYMPTOMS
SORE THROAT: 0
SINUS PRESSURE: 0
VOMITING: 0
SHORTNESS OF BREATH: 0
NAUSEA: 0
DIARRHEA: 0
RHINORRHEA: 0
ABDOMINAL PAIN: 0

## 2022-09-21 ASSESSMENT — PAIN - FUNCTIONAL ASSESSMENT: PAIN_FUNCTIONAL_ASSESSMENT: NONE - DENIES PAIN

## 2022-09-21 NOTE — DISCHARGE INSTRUCTIONS
Fall precautions. Encourage fluids. Continue your treatment for the urinary tract infection. Follow-up with your primary care doctor for further treatment and evaluation. Return immediately to the emergency room for any new or worsening symptoms.

## 2022-09-21 NOTE — ED PROVIDER NOTES
[facility] EMERGENCY DEPT  eMERGENCY dEPARTMENT eNCOUnter      Pt Name: Ada Mcintyre  MRN: [id number]  [place] 5/22/1932  Date of evaluation: 9/21/2022  Provider: Carlyn Cabello MD    CHIEF COMPLAINT       Chief Complaint   Patient presents with    Fall     Unwitnessed fall out of bed. Pt denies pain          HISTORY OF PRESENT ILLNESS   (Location/Symptom, Timing/Onset,Context/Setting, Quality, Duration, Modifying Factors, Severity)  Note limiting factors. Ada Mcintyre is a 80 y.o. female who presents to the emergency department after a fall    HPI  The patient is a 28-year-old white female currently being treated for urinary tract infection with a history of Alzheimer's disease; osteoporosis; severe cognitive dysfunction; third cervical vertebral fracture in the past; muscle weakness; difficulty with ambulation; there is at the bedside is concerned that she has not been eating or drinking well since she started taking the Bactrim for urinary tract infection. NursingNotes were reviewed. REVIEW OF SYSTEMS    (2-9 systems for level 4, 10 or more for level 5)     Review of Systems   Constitutional:  Negative for chills, diaphoresis, fatigue and fever. HENT:  Negative for rhinorrhea, sinus pressure and sore throat. Eyes:  Negative for visual disturbance. Respiratory:  Negative for shortness of breath. Cardiovascular:  Negative for chest pain. Gastrointestinal:  Negative for abdominal pain, diarrhea, nausea and vomiting. Genitourinary:  Positive for dysuria. Negative for difficulty urinating. Musculoskeletal:  Negative for arthralgias and myalgias. Skin:  Negative for rash. Neurological:  Negative for weakness. Psychiatric/Behavioral:  Negative for confusion. All other systems reviewed and are negative.          PAST MEDICALHISTORY     Past Medical History:   Diagnosis Date    Alzheimer's dementia ([facility] [address]     Anxiety     Hypertension     Repeated falls          SURGICAL HISTORY       Past Surgical History:   Procedure Laterality Date    EYE SURGERY      HIP SURGERY           CURRENT MEDICATIONS     Discharge Medication List as of 9/21/2022  7:33 AM        CONTINUE these medications which have NOT CHANGED    Details   MELATONIN PO Take by mouthHistorical Med      acetaminophen (TYLENOL) 325 MG tablet Take 2 tablets by mouth every 8 hours as needed for Pain (For temp greater than 100.5 F (38 C)), Disp-120 tablet, R-0DC to SNF      denosumab (PROLIA) 60 MG/ML SOLN SC injection Inject 60 mg into the skin every 6 months Historical Med             ALLERGIES     Morphine and Penicillins    FAMILY HISTORY     History reviewed. No pertinent family history. SOCIAL HISTORY       Social History     Socioeconomic History    Marital status:      Spouse name: None    Number of children: None    Years of education: None    Highest education level: None   Tobacco Use    Smoking status: Never    Smokeless tobacco: Never   Substance and Sexual Activity    Alcohol use: No    Drug use: No       SCREENINGS    [place] Coma Scale  Eye Opening: Spontaneous  Best Verbal Response: Confused  Best Motor Response: Obeys commands  [place] Coma Scale Score: 14        PHYSICAL EXAM    (up to 7 for level 4, 8 or more for level 5)     ED Triage Vitals   BP Temp Temp Source Heart Rate Resp SpO2 Height Weight   09/21/22 0125 09/21/22 0127 09/21/22 0127 09/21/22 0125 09/21/22 0125 09/21/22 0125 -- --   (!) 158/89 98 °F (36.7 °C) Oral 98 16 92 %         Physical Exam  Vitals and nursing note reviewed. Constitutional:       General: She is in acute distress. Appearance: She is well-developed. HENT:      Head: Normocephalic and atraumatic. Nose: Nose normal.      Mouth/Throat:      Mouth: Mucous membranes are moist.   Eyes:      General:         Right eye: No discharge. Left eye: No discharge. Conjunctiva/sclera: Conjunctivae normal.      Pupils: Pupils are equal, round, and reactive to light. Cardiovascular:      Rate and Rhythm: Normal rate and regular rhythm. Heart sounds: Normal heart sounds. Pulmonary:      Effort: Pulmonary effort is normal. No respiratory distress. Breath sounds: Normal breath sounds. No wheezing or rales. Abdominal:      General: Bowel sounds are normal.      Palpations: Abdomen is soft. There is no mass. Tenderness: There is no abdominal tenderness. There is no guarding or rebound. Musculoskeletal:         General: No tenderness. Normal range of motion. Cervical back: Normal range of motion and neck supple. Skin:     General: Skin is warm and dry. Capillary Refill: Capillary refill takes less than 2 seconds. Neurological:      Mental Status: She is alert and oriented to person, place, and time. Mental status is at baseline. Psychiatric:      Comments: Oriented to person only; flat affect; clear speech     DIAGNOSTIC RESULTS       RADIOLOGY:  Non-plain film images such as CT, Ultrasound and MRI are read by the radiologist. Plain radiographic images are visualized and preliminarily interpreted bythe emergency physician with the below findings:      CT Head W/O Contrast   Final Result   1. No acute intercranial abnormality. 2. Senescent changes. 3. Consider MRI if there is further concern. Electronically signed by Alyssa Hatch MD on 09-21-22 at [address] [place] [address]   Final Result   1. Chronic fracture of the C3 spinous process with well-corticated margins. 2. No acute fracture detected. 3. Degenerative changes. 4. Consider MRI if there is further concern for additional fractures or signs of trauma within the    cervical spine. Electronically signed by Alyssa Hatch MD on 09-21-22 at 9930              LABS:  Labs Reviewed   CBC WITH AUTO DIFFERENTIAL - Abnormal; Notable for the following components:       Result Value    WBC 11.2 (*)     RBC 3.81 (*)     MCH 32.3 (*)     MCHC 32.7 (*)     MPV 9.2 (*)     Neutrophils % 70.9 (*)     Lymphocytes % 15.8 (*)     Monocytes % 11.0 (*)     Neutrophils Absolute 8.0 (*)     Monocytes Absolute 1.20 (*)     All other components within normal limits   COMPREHENSIVE METABOLIC PANEL - Abnormal; Notable for the following components:    Creatinine 1.3 (*)     GFR Non- 38 (*)     GFR African American 47 (*)     Alkaline Phosphatase 140 (*)     All other components within normal limits   URINALYSIS WITH REFLEX TO CULTURE - Abnormal; Notable for the following components:    Ketones, Urine TRACE (*)     Leukocyte Esterase, Urine MODERATE (*)     All other components within normal limits   MICROSCOPIC URINALYSIS - Abnormal; Notable for the following components:    Bacteria, UA NEGATIVE (*)     Crystals, UA NEG (*)     WBC, UA 27 (*)     All other components within normal limits   CULTURE, URINE       All other labs were within normal range or not returned as of this dictation. EMERGENCY DEPARTMENT COURSE and DIFFERENTIAL DIAGNOSIS/MDM:   Vitals:    Vitals:    09/21/22 0127 09/21/22 0300 09/21/22 0600 09/21/22 0748   BP:  (!) 140/65 122/62 133/69   Pulse:  87 82 80   Resp:  16  18   Temp: 98 °F (36.7 °C)   98.1 °F (36.7 °C)   TempSrc: Oral   Oral   SpO2:  96% 97% 96%       MDM    No acute injury on the radiographic studies from the fall. Does show some evidence of dehydration; also has equivocal urine and is under treatment with Bactrim for urinary tract infection. Daughter is at the bedside feel comfortable with her returning to the nursing home. PROCEDURES:  Unless otherwise noted below, none     Procedures    FINAL IMPRESSION      1. Closed head injury, initial encounter    2. Fall, initial encounter          DISPOSITION/PLAN   DISPOSITION Decision To Discharge 09/21/2022 05:45:47 AM      PATIENT REFERRED TO:  [address]   [place] [address] [phone]        DISCHARGE MEDICATIONS:  Discharge Medication List as of 9/21/2022  7:33 AM (Please note that portions of this note were completed with a voice recognition program.  Efforts were made to edit thedictations but occasionally words are mis-transcribed.)    Blayne Olson MD (electronically signed)  Attending Emergency Physician         Blayne Olson MD  09/21/22 7115

## 2022-09-23 LAB — URINE CULTURE, ROUTINE: NORMAL

## 2022-09-27 ENCOUNTER — HOSPITAL ENCOUNTER (EMERGENCY)
Age: 87
Discharge: NURSING, REHAB OR LONG-TERM CARE | End: 2022-09-28
Attending: PEDIATRICS
Payer: MEDICARE

## 2022-09-27 ENCOUNTER — APPOINTMENT (OUTPATIENT)
Dept: CT IMAGING | Age: 87
End: 2022-09-27
Payer: MEDICARE

## 2022-09-27 DIAGNOSIS — S00.83XA CONTUSION OF FACE, INITIAL ENCOUNTER: ICD-10-CM
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
## 2022-09-27 LAB
ALBUMIN SERPL-MCNC: 4.1 G/DL (ref 3.5–5.2)
ALP BLD-CCNC: 101 U/L (ref 35–104)
ALT SERPL-CCNC: 14 U/L (ref 5–33)
ANION GAP SERPL CALCULATED.3IONS-SCNC: 9 MMOL/L (ref 7–19)
AST SERPL-CCNC: 22 U/L (ref 5–32)
BILIRUB SERPL-MCNC: 0.3 MG/DL (ref 0.2–1.2)
BUN BLDV-MCNC: 17 MG/DL (ref 8–23)
CALCIUM SERPL-MCNC: 10.5 MG/DL (ref 8.8–10.2)
CHLORIDE BLD-SCNC: 102 MMOL/L (ref 98–111)
CO2: 29 MMOL/L (ref 22–29)
CREAT SERPL-MCNC: 0.8 MG/DL (ref 0.5–0.9)
GFR AFRICAN AMERICAN: >59
GFR NON-AFRICAN AMERICAN: >60
GLUCOSE BLD-MCNC: 100 MG/DL (ref 74–109)
LACTIC ACID: 1 MMOL/L (ref 0.5–1.9)
POTASSIUM SERPL-SCNC: 4.6 MMOL/L (ref 3.5–5)
SODIUM BLD-SCNC: 140 MMOL/L (ref 136–145)
TOTAL PROTEIN: 7.3 G/DL (ref 6.6–8.7)
TROPONIN: 0.02 NG/ML (ref 0–0.03)

## 2022-09-27 PROCEDURE — 6360000002 HC RX W HCPCS: Performed by: PEDIATRICS

## 2022-09-27 PROCEDURE — 70450 CT HEAD/BRAIN W/O DYE: CPT

## 2022-09-27 PROCEDURE — 72125 CT NECK SPINE W/O DYE: CPT

## 2022-09-27 PROCEDURE — 96374 THER/PROPH/DIAG INJ IV PUSH: CPT

## 2022-09-27 PROCEDURE — 83605 ASSAY OF LACTIC ACID: CPT

## 2022-09-27 PROCEDURE — 84484 ASSAY OF TROPONIN QUANT: CPT

## 2022-09-27 PROCEDURE — 80053 COMPREHEN METABOLIC PANEL: CPT

## 2022-09-27 PROCEDURE — 70486 CT MAXILLOFACIAL W/O DYE: CPT

## 2022-09-27 PROCEDURE — 36415 COLL VENOUS BLD VENIPUNCTURE: CPT

## 2022-09-27 PROCEDURE — 2580000003 HC RX 258: Performed by: PEDIATRICS

## 2022-09-27 PROCEDURE — 85025 COMPLETE CBC W/AUTO DIFF WBC: CPT

## 2022-09-27 PROCEDURE — 87040 BLOOD CULTURE FOR BACTERIA: CPT

## 2022-09-27 PROCEDURE — 99284 EMERGENCY DEPT VISIT MOD MDM: CPT

## 2022-09-27 RX ORDER — SULFAMETHOXAZOLE AND TRIMETHOPRIM 800; 160 MG/1; MG/1
TABLET ORAL
COMMUNITY
Start: 2022-09-16

## 2022-09-27 RX ORDER — DENOSUMAB 60 MG/ML
INJECTION SUBCUTANEOUS
COMMUNITY
Start: 2022-09-13

## 2022-09-27 RX ORDER — CEFTRIAXONE 1 G/1
1000 INJECTION, POWDER, FOR SOLUTION INTRAMUSCULAR; INTRAVENOUS EVERY 24 HOURS
COMMUNITY

## 2022-09-27 RX ORDER — 0.9 % SODIUM CHLORIDE 0.9 %
1000 INTRAVENOUS SOLUTION INTRAVENOUS ONCE
Status: COMPLETED | OUTPATIENT
Start: 2022-09-27 | End: 2022-09-28

## 2022-09-27 RX ORDER — MIRTAZAPINE 15 MG/1
15 TABLET, FILM COATED ORAL NIGHTLY
COMMUNITY

## 2022-09-27 RX ADMIN — CEFTRIAXONE 1000 MG: 1 INJECTION, POWDER, FOR SOLUTION INTRAMUSCULAR; INTRAVENOUS at 23:15

## 2022-09-27 RX ADMIN — SODIUM CHLORIDE 1000 ML: 9 INJECTION, SOLUTION INTRAVENOUS at 23:15

## 2022-09-28 VITALS
HEART RATE: 80 BPM | WEIGHT: 140 LBS | RESPIRATION RATE: 20 BRPM | BODY MASS INDEX: 22.6 KG/M2 | OXYGEN SATURATION: 95 % | DIASTOLIC BLOOD PRESSURE: 75 MMHG | SYSTOLIC BLOOD PRESSURE: 160 MMHG

## 2022-09-28 LAB
BASOPHILS ABSOLUTE: 0.1 K/UL (ref 0–0.2)
BASOPHILS RELATIVE PERCENT: 0.9 % (ref 0–1)
EOSINOPHILS ABSOLUTE: 0.1 K/UL (ref 0–0.6)
EOSINOPHILS RELATIVE PERCENT: 1 % (ref 0–5)
HCT VFR BLD CALC: 41.6 % (ref 37–47)
HEMOGLOBIN: 13.2 G/DL (ref 12–16)
IMMATURE GRANULOCYTES #: 0.1 K/UL
LYMPHOCYTES ABSOLUTE: 1.4 K/UL (ref 1.1–4.5)
LYMPHOCYTES RELATIVE PERCENT: 16.3 % (ref 20–40)
MCH RBC QN AUTO: 32.4 PG (ref 27–31)
MCHC RBC AUTO-ENTMCNC: 31.7 G/DL (ref 33–37)
MCV RBC AUTO: 102 FL (ref 81–99)
MONOCYTES ABSOLUTE: 0.8 K/UL (ref 0–0.9)
MONOCYTES RELATIVE PERCENT: 8.5 % (ref 0–10)
NEUTROPHILS ABSOLUTE: 6.4 K/UL (ref 1.5–7.5)
NEUTROPHILS RELATIVE PERCENT: 72.5 % (ref 50–65)
PDW BLD-RTO: 14.6 % (ref 11.5–14.5)
PLATELET # BLD: 291 K/UL (ref 130–400)
PMV BLD AUTO: 9.5 FL (ref 9.4–12.3)
RBC # BLD: 4.08 M/UL (ref 4.2–5.4)
WBC # BLD: 8.8 K/UL (ref 4.8–10.8)

## 2022-09-28 PROCEDURE — 87040 BLOOD CULTURE FOR BACTERIA: CPT

## 2022-09-28 PROCEDURE — 36415 COLL VENOUS BLD VENIPUNCTURE: CPT

## 2022-09-28 NOTE — ED PROVIDER NOTES
[facility] EMERGENCY DEPT  eMERGENCY dEPARTMENT eNCOUnter      Pt Name: Pravin Jacobo  MRN: [id number]  [place] 5/22/1932  Date of evaluation: 9/27/2022  Provider: Connie Madrigal MD    CHIEF COMPLAINT       Chief Complaint   Patient presents with    Fall     PT had unwitnessed fall in room at nursing home, PT was found face down in the floor next to the door, PT has bruising and small abrasions noted to left  eyebrow and nose          HISTORY OF PRESENT ILLNESS   (Location/Symptom, Timing/Onset,Context/Setting, Quality, Duration, Modifying Factors, Severity)  Note limiting factors. Pravin Jacobo is a 80 y.o. female who presents to the emergency department after falling. Patient has dementia and is not able to give history. Patient was found by nursing home staff on the floor facedown. Patient has a history of falls in the recent past.  Fall was unwitnessed. Patient was awake. Patient has a history of urinary tract infection and was started on Rocephin IM yesterday after finishing 1 week of Bactrim DS. Patient has had decreased appetite and has been eating and drinking less. Patient was noted to have bruising and swelling of her upper mid face. Patient denies knowledge of incident. Patient denies current pain. Patient is not on blood thinners at this time. HPI    NursingNotes were reviewed. REVIEW OF SYSTEMS    (2-9 systems for level 4, 10 or more for level 5)     Review of Systems   Unable to perform ROS: Dementia   Neurological:  Negative for syncope. Psychiatric/Behavioral:  Positive for confusion (Chronic).            PAST MEDICALHISTORY     Past Medical History:   Diagnosis Date    Alzheimer's dementia ([facility] [address]     Anxiety     Hypertension     Repeated falls          SURGICAL HISTORY       Past Surgical History:   Procedure Laterality Date    EYE SURGERY      HIP SURGERY           CURRENT MEDICATIONS     Discharge Medication List as of 9/28/2022 12:34 AM        CONTINUE these medications which have NOT CHANGED    Details   mirtazapine (REMERON) 15 MG tablet Take 15 mg by mouth nightlyHistorical Med      cefTRIAXone (ROCEPHIN) 1 g injection Inject 1,000 mg into the muscle every 24 hoursHistorical Med      calcium carbonate 1500 (600 Ca) MG TABS tablet Take 600 mg by mouth 2 times daily (with meals)Historical Med      PROLIA 60 MG/ML SOSY SC injection DAWHistorical Med      sulfamethoxazole-trimethoprim (BACTRIM DS;SEPTRA DS) 800-160 MG per tablet Historical Med      MELATONIN PO Take by mouthHistorical Med      acetaminophen (TYLENOL) 325 MG tablet Take 2 tablets by mouth every 8 hours as needed for Pain (For temp greater than 100.5 F (38 C)), Disp-120 tablet, R-0DC to SNF             ALLERGIES     Morphine and Penicillins    FAMILY HISTORY     History reviewed. No pertinent family history. SOCIAL HISTORY       Social History     Socioeconomic History    Marital status:      Spouse name: None    Number of children: None    Years of education: None    Highest education level: None   Tobacco Use    Smoking status: Never    Smokeless tobacco: Never   Substance and Sexual Activity    Alcohol use: No    Drug use: No       SCREENINGS    [facility] Coma Scale  Eye Opening: Spontaneous  Best Verbal Response: Confused  Best Motor Response: Obeys commands  [facility] Coma Scale Score: 14        PHYSICAL EXAM    (up to 7 for level 4, 8 or more for level 5)     ED Triage Vitals [09/27/22 2000]   BP Temp Temp src Heart Rate Resp SpO2 Height Weight   (!) 168/73 -- -- 86 20 97 % -- 140 lb (63.5 kg)       Physical Exam  Vitals and nursing note reviewed. Constitutional:       General: She is not in acute distress. Appearance: Normal appearance. HENT:      Head: Normocephalic. Comments: Patient has bruising and swelling overlying upper mid face and bridge of nose. Area is becoming ecchymotic. Patient denies tenderness to palpation.      Right Ear: Tympanic membrane, ear canal and external ear normal. Sensory: No sensory deficit. Motor: Weakness (Generalized weakness consistent with age.) present. No abnormal muscle tone or seizure activity. Coordination: Coordination normal.      Comments: Patient has baseline confusion secondary to dementia. Neurologic exam limited secondary to patient's dementia. Psychiatric:         Mood and Affect: Mood normal.         Speech: Speech normal.         Behavior: Behavior normal.         Cognition and Memory: Cognition is impaired. Comments: Patient smiles and interacts animatedly. DIAGNOSTIC RESULTS     EKG: All EKG's areinterpreted by the Emergency Department Physician who either signs or Co-signs this chart in the absence of a cardiologist.        RADIOLOGY:  Non-plain film images such as CT, Ultrasound and MRI are read by the radiologist. Plain radiographic images are visualized and preliminarily interpreted bythe emergency physician with the below findings:      CT Head W/O Contrast   Final Result   No acute intracranial hemorrhage, midline shift, or mass effect. Forehead soft tissue swelling. Electronically signed by Franchesca Douglas MD on 09-27-22 at [facility]   Final Result   No facial bone fracture. Electronically signed by Franchesca Douglas MD on 09-27-22 at [address] [place], [place] [address]ine W/O Contrast   Final Result   Subacute fracture of the C3 spinous process. Correlate for recent trauma. Electronically signed by Franchesca Douglas MD on 09-27-22 at [address] [place] [address]:  Labs Reviewed   CBC WITH AUTO DIFFERENTIAL - Abnormal; Notable for the following components:       Result Value    RBC 4.08 (*)     .0 (*)     MCH 32.4 (*)     MCHC 31.7 (*)     RDW 14.6 (*)     Neutrophils % 72.5 (*)     Lymphocytes % 16.3 (*)     All other components within normal limits   COMPREHENSIVE METABOLIC PANEL - Abnormal; Notable for the following components:    Calcium 10.5 (*)     All other components within normal limits   CULTURE, BLOOD 2    Narrative: history. CONSULTS:  None    PROCEDURES:  Unless otherwise noted below, none     Procedures    FINAL IMPRESSION      1. Fall, initial encounter    2.  Contusion of face, initial encounter          DISPOSITION/PLAN   DISPOSITION Decision To Discharge 09/28/2022 12:31:17 AM               (Please note that portions of this note were completed with a voice recognition program.  Efforts were made to edit thedictations but occasionally words are mis-transcribed.)    Antonietta Nick MD (electronically signed)  Attending Emergency Physician          Antonietta Nick MD  09/29/22 8745

## 2022-09-28 NOTE — ED NOTES
[facility] to transport PT back to [facility], [facility]  09/28/22 9838

## 2022-09-29 ENCOUNTER — TELEPHONE (OUTPATIENT)
Dept: FAMILY MEDICINE CLINIC | Age: 87
End: 2022-09-29

## 2022-09-29 NOTE — TELEPHONE ENCOUNTER
Pt's daughter lives out of [facility] and [place] is not doing well. Daksha Rodriguez, is wanting to know if you will fill out some [facility] papers for her to be able to work remotely so she can come visit with her mom some. Please advise.

## 2022-10-03 LAB
BLOOD CULTURE, ROUTINE: NORMAL
CULTURE, BLOOD 2: NORMAL

## 2022-10-23 ENCOUNTER — APPOINTMENT (OUTPATIENT)
Dept: CT IMAGING | Age: 87
End: 2022-10-23
Payer: MEDICARE

## 2022-10-23 ENCOUNTER — HOSPITAL ENCOUNTER (EMERGENCY)
Age: 87
Discharge: NURSING, REHAB OR LONG-TERM CARE | End: 2022-10-24
Payer: MEDICARE

## 2022-10-23 ENCOUNTER — APPOINTMENT (OUTPATIENT)
Dept: GENERAL RADIOLOGY | Age: 87
End: 2022-10-23
Payer: MEDICARE

## 2022-10-23 DIAGNOSIS — N30.00 ACUTE CYSTITIS WITHOUT HEMATURIA: Primary | ICD-10-CM
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
## 2022-10-23 LAB
ALBUMIN SERPL-MCNC: 3.9 G/DL (ref 3.5–5.2)
ALP BLD-CCNC: 76 U/L (ref 35–104)
ALT SERPL-CCNC: 7 U/L (ref 5–33)
ANION GAP SERPL CALCULATED.3IONS-SCNC: 8 MMOL/L (ref 7–19)
AST SERPL-CCNC: 16 U/L (ref 5–32)
BASE EXCESS ARTERIAL: 6.2 MMOL/L (ref -2–2)
BASOPHILS ABSOLUTE: 0.1 K/UL (ref 0–0.2)
BASOPHILS RELATIVE PERCENT: 1 % (ref 0–1)
BILIRUB SERPL-MCNC: <0.2 MG/DL (ref 0.2–1.2)
BUN BLDV-MCNC: 16 MG/DL (ref 8–23)
CALCIUM SERPL-MCNC: 9.8 MG/DL (ref 8.8–10.2)
CARBOXYHEMOGLOBIN ARTERIAL: 1.7 % (ref 0–5)
CHLORIDE BLD-SCNC: 103 MMOL/L (ref 98–111)
CO2: 31 MMOL/L (ref 22–29)
CREAT SERPL-MCNC: 0.8 MG/DL (ref 0.5–0.9)
EOSINOPHILS ABSOLUTE: 0.1 K/UL (ref 0–0.6)
EOSINOPHILS RELATIVE PERCENT: 1.1 % (ref 0–5)
FIO2: 21 %
GFR SERPL CREATININE-BSD FRML MDRD: >60 ML/MIN/{1.73_M2}
GLUCOSE BLD-MCNC: 103 MG/DL (ref 74–109)
HCO3 ARTERIAL: 31.2 MMOL/L (ref 22–26)
HCT VFR BLD CALC: 37.9 % (ref 37–47)
HEMOGLOBIN, ART, EXTENDED: 11.7 G/DL (ref 12–16)
HEMOGLOBIN: 12 G/DL (ref 12–16)
IMMATURE GRANULOCYTES #: 0.1 K/UL
LYMPHOCYTES ABSOLUTE: 1.8 K/UL (ref 1.1–4.5)
LYMPHOCYTES RELATIVE PERCENT: 17.7 % (ref 20–40)
MCH RBC QN AUTO: 32.7 PG (ref 27–31)
MCHC RBC AUTO-ENTMCNC: 31.7 G/DL (ref 33–37)
MCV RBC AUTO: 103.3 FL (ref 81–99)
METHEMOGLOBIN ARTERIAL: 3.8 %
MODE: ABNORMAL
MONOCYTES ABSOLUTE: 1.2 K/UL (ref 0–0.9)
MONOCYTES RELATIVE PERCENT: 12.2 % (ref 0–10)
NEUTROPHILS ABSOLUTE: 6.6 K/UL (ref 1.5–7.5)
NEUTROPHILS RELATIVE PERCENT: 67.1 % (ref 50–65)
O2 CONTENT ARTERIAL: 14.8 ML/DL
O2 SAT, ARTERIAL: 89.9 %
O2 THERAPY: ABNORMAL
PCO2 ARTERIAL: 46 MMHG (ref 35–45)
PDW BLD-RTO: 14.9 % (ref 11.5–14.5)
PH ARTERIAL: 7.44 (ref 7.35–7.45)
PLATELET # BLD: 239 K/UL (ref 130–400)
PMV BLD AUTO: 9 FL (ref 9.4–12.3)
PO2 ARTERIAL: 67 MMHG (ref 80–100)
POTASSIUM REFLEX MAGNESIUM: 4.2 MMOL/L (ref 3.5–5)
POTASSIUM, WHOLE BLOOD: 4.1
RBC # BLD: 3.67 M/UL (ref 4.2–5.4)
SAMPLE SOURCE: ABNORMAL
SODIUM BLD-SCNC: 142 MMOL/L (ref 136–145)
SPONT RATE(BPM): 20
TOTAL CK: 68 U/L (ref 26–192)
TOTAL PROTEIN: 6.8 G/DL (ref 6.6–8.7)
WBC # BLD: 9.9 K/UL (ref 4.8–10.8)

## 2022-10-23 PROCEDURE — 99284 EMERGENCY DEPT VISIT MOD MDM: CPT | Performed by: PEDIATRICS

## 2022-10-23 PROCEDURE — 36600 WITHDRAWAL OF ARTERIAL BLOOD: CPT

## 2022-10-23 PROCEDURE — 96365 THER/PROPH/DIAG IV INF INIT: CPT

## 2022-10-23 PROCEDURE — 72125 CT NECK SPINE W/O DYE: CPT

## 2022-10-23 PROCEDURE — 70450 CT HEAD/BRAIN W/O DYE: CPT

## 2022-10-23 PROCEDURE — 74022 RADEX COMPL AQT ABD SERIES: CPT

## 2022-10-23 PROCEDURE — 72170 X-RAY EXAM OF PELVIS: CPT

## 2022-10-23 ASSESSMENT — ENCOUNTER SYMPTOMS
ABDOMINAL DISTENTION: 0
EYE PAIN: 0
EYE DISCHARGE: 0
COUGH: 0
RHINORRHEA: 0
NAUSEA: 0
COLOR CHANGE: 0
SORE THROAT: 0
ABDOMINAL PAIN: 1
BACK PAIN: 0
APNEA: 0
PHOTOPHOBIA: 0
SHORTNESS OF BREATH: 0

## 2022-10-23 ASSESSMENT — PAIN - FUNCTIONAL ASSESSMENT: PAIN_FUNCTIONAL_ASSESSMENT: NONE - DENIES PAIN

## 2022-10-24 VITALS
TEMPERATURE: 98 F | HEART RATE: 84 BPM | HEIGHT: 66 IN | BODY MASS INDEX: 22.5 KG/M2 | WEIGHT: 140 LBS | SYSTOLIC BLOOD PRESSURE: 109 MMHG | RESPIRATION RATE: 16 BRPM | DIASTOLIC BLOOD PRESSURE: 60 MMHG | OXYGEN SATURATION: 90 %

## 2022-10-24 LAB
BACTERIA: ABNORMAL /HPF
BILIRUBIN URINE: ABNORMAL
BLOOD, URINE: NEGATIVE
CLARITY: ABNORMAL
COLOR: ABNORMAL
EPITHELIAL CELLS, UA: ABNORMAL /HPF
GLUCOSE URINE: NEGATIVE MG/DL
KETONES, URINE: NEGATIVE MG/DL
LEUKOCYTE ESTERASE, URINE: ABNORMAL
NITRITE, URINE: POSITIVE
PH UA: 6.5 (ref 5–8)
PROTEIN UA: 30 MG/DL
RBC UA: ABNORMAL /HPF (ref 0–2)
SARS-COV-2, NAAT: NOT DETECTED
SPECIFIC GRAVITY UA: 1.01 (ref 1–1.03)
UROBILINOGEN, URINE: 1 E.U./DL
WBC UA: ABNORMAL /HPF (ref 0–5)

## 2022-10-24 PROCEDURE — 87186 SC STD MICRODIL/AGAR DIL: CPT

## 2022-10-24 PROCEDURE — 82550 ASSAY OF CK (CPK): CPT

## 2022-10-24 PROCEDURE — 81001 URINALYSIS AUTO W/SCOPE: CPT

## 2022-10-24 PROCEDURE — 36415 COLL VENOUS BLD VENIPUNCTURE: CPT

## 2022-10-24 PROCEDURE — 87635 SARS-COV-2 COVID-19 AMP PRB: CPT

## 2022-10-24 PROCEDURE — 6360000002 HC RX W HCPCS: Performed by: PEDIATRICS

## 2022-10-24 PROCEDURE — 82803 BLOOD GASES ANY COMBINATION: CPT

## 2022-10-24 PROCEDURE — 85025 COMPLETE CBC W/AUTO DIFF WBC: CPT

## 2022-10-24 PROCEDURE — 80053 COMPREHEN METABOLIC PANEL: CPT

## 2022-10-24 PROCEDURE — 87086 URINE CULTURE/COLONY COUNT: CPT

## 2022-10-24 RX ORDER — MEROPENEM AND SODIUM CHLORIDE 1 G/50ML
1000 INJECTION, SOLUTION INTRAVENOUS EVERY 8 HOURS
Status: DISCONTINUED | OUTPATIENT
Start: 2022-10-24 | End: 2022-10-24 | Stop reason: HOSPADM

## 2022-10-24 RX ADMIN — MEROPENEM AND SODIUM CHLORIDE 1000 MG: 1 INJECTION, SOLUTION INTRAVENOUS at 01:02

## 2022-10-24 NOTE — ED NOTES
Pt's brief changed, [facility] at bedside to take pt back to nursing home     Ella Florentino, [address]  10/24/22 9900

## 2022-10-24 NOTE — ED PROVIDER NOTES
[facility] EM[facility]Y DE[facility]  eMERGENCYdEPARTMENT eNCOUnter      Pt Name: Ronan Ni  MRN: [id number]  [place] 5/22/1932  Date of evaluation: 10/23/2022  Provider:DAVON Bland    CHIEF COMPLAINT       Chief Complaint   Patient presents with    Fall     Swelling to back of head noted as well as bruising to jin cheeks that could be old bruises         HISTORY OF PRESENT ILLNESS  (Location/Symptom, Timing/Onset, Context/Setting, Quality, Duration, Modifying Factors, Severity.)   Ronan Ni is a 80 y.o. female who presents to the emergency department with complaints of unwitnessed fall. She has bruising on occiput right aspect alzheimer frequent falls at nursing this wasn't witnessed has hip replacement prone to impaction and chronic UTI. Daughters providing all hx but she is alert and follows commands. HPI    Nursing Notes were reviewed and I agree. REVIEW OF SYSTEMS    (2-9 systems for level 4, 10 or more for level 5)     Review of Systems   Constitutional:  Negative for activity change, appetite change, chills and fever. HENT:  Negative for congestion, postnasal drip, rhinorrhea and sore throat. Eyes:  Negative for photophobia, pain, discharge and visual disturbance. Respiratory:  Negative for apnea, cough and shortness of breath. Cardiovascular:  Negative for chest pain and leg swelling. Gastrointestinal:  Positive for abdominal pain. Negative for abdominal distention and nausea. Genitourinary:  Negative for vaginal bleeding. Musculoskeletal:  Negative for arthralgias, back pain, joint swelling, neck pain and neck stiffness. Skin:  Negative for color change and rash. Neurological:  Negative for dizziness, syncope, facial asymmetry and headaches. Hematological:  Negative for adenopathy. Does not bruise/bleed easily. Psychiatric/Behavioral:  Negative for agitation, behavioral problems and confusion.        Except as noted above the remainder of the review of systems was reviewed and negative. PAST MEDICAL HISTORY     Past Medical History:   Diagnosis Date    Alzheimer's dementia ([facility] [address]     Anxiety     Hypertension     Repeated falls          SURGICAL HISTORY       Past Surgical History:   Procedure Laterality Date    EYE SURGERY      HIP SURGERY           CURRENT MEDICATIONS       Discharge Medication List as of 10/24/2022  3:53 AM        CONTINUE these medications which have NOT CHANGED    Details   calcium carbonate 1500 (600 Ca) MG TABS tablet Take 600 mg by mouth 2 times daily (with meals)Historical Med      PROLIA 60 MG/ML SOSY SC injection DAWHistorical Med      sulfamethoxazole-trimethoprim (BACTRIM DS;SEPTRA DS) 800-160 MG per tablet Historical Med      mirtazapine (REMERON) 15 MG tablet Take 15 mg by mouth nightlyHistorical Med      cefTRIAXone (ROCEPHIN) 1 g injection Inject 1,000 mg into the muscle every 24 hoursHistorical Med      MELATONIN PO Take by mouthHistorical Med      acetaminophen (TYLENOL) 325 MG tablet Take 2 tablets by mouth every 8 hours as needed for Pain (For temp greater than 100.5 F (38 C)), Disp-120 tablet, R-0DC to SNF             ALLERGIES     Morphine and Penicillins    FAMILY HISTORY     No family history on file. SOCIAL HISTORY       Social History     Socioeconomic History    Marital status:    Tobacco Use    Smoking status: Never    Smokeless tobacco: Never   Substance and Sexual Activity    Alcohol use: No    Drug use: No       SCREENINGS    Chase Coma Scale  Eye Opening: Spontaneous  Best Verbal Response: Confused  Best Motor Response: Obeys commands  Quiana Coma Scale Score: 14      PHYSICAL EXAM    (up to 7 forlevel 4, 8 or more for level 5)     ED Triage Vitals [10/23/22 2219]   BP Temp Temp src Heart Rate Resp SpO2 Height Weight   (!) 172/87 98.2 °F (36.8 °C) -- 84 16 93 % 5' 6\" (1.676 m) 140 lb (63.5 kg)       Physical Exam  Vitals and nursing note reviewed. Constitutional:       General: She is not in acute distress. Appearance: Normal appearance. She is well-developed. She is not diaphoretic. HENT:      Head: Normocephalic and atraumatic. Right Ear: Tympanic membrane, ear canal and external ear normal.      Left Ear: Tympanic membrane, ear canal and external ear normal.      Mouth/Throat:      Pharynx: No oropharyngeal exudate. Eyes:      General:         Right eye: No discharge. Left eye: No discharge. Pupils: Pupils are equal, round, and reactive to light. Neck:      Thyroid: No thyromegaly. Cardiovascular:      Rate and Rhythm: Normal rate and regular rhythm. Heart sounds: Normal heart sounds. No murmur heard. No friction rub. Pulmonary:      Effort: Pulmonary effort is normal. No respiratory distress. Breath sounds: Normal breath sounds. No stridor. No wheezing. Abdominal:      General: Abdomen is flat. Bowel sounds are normal. There is no distension. Palpations: Abdomen is soft. Tenderness: There is abdominal tenderness. Musculoskeletal:         General: Normal range of motion. Cervical back: Normal range of motion and neck supple. Skin:     General: Skin is warm and dry. Capillary Refill: Capillary refill takes less than 2 seconds. Findings: No rash. Neurological:      General: No focal deficit present. Mental Status: She is alert and oriented to person, place, and time. Mental status is at baseline. Cranial Nerves: No cranial nerve deficit. Sensory: No sensory deficit. Coordination: Coordination normal.   Psychiatric:         Mood and Affect: Mood normal.         Behavior: Behavior normal.         Thought Content:  Thought content normal.         Judgment: Judgment normal.         DIAGNOSTIC RESULTS     RADIOLOGY:   Non-plain film images such as CT, Ultrasound and MRI are read by the radiologist. Plain radiographic images are visualized and preliminarilyinterpreted by No att. providers found with the below findings:      Interpretation per the Radiologist below, if available at the time of this note:    XR PELVIS (1-2 VIEWS)   Final Result   No acute findings. Electronically signed by Amy Guillaume MD on 10-23-22 at 2342      XR ACUTE ABD SERIES CHEST 1 VW   Final Result   Marked generalized increase in stool throughout the colon and rectum. Electronically signed by Amy Guillaume MD on 10-23-22 at 2345      CT HEAD WO CONTRAST   Final Result      CT CERVICAL SPINE WO CONTRAST   Final Result   No acute fracture or traumatic malalignment. Electronically signed by Wayne Jones MD on 10-23-22 at [address] TO CULTURE - Abnormal; Notable for the following components:       Result Value    Color, UA ORANGE (*)     Clarity, UA CLOUDY (*)     Bilirubin Urine SMALL (*)     Protein, UA 30 (*)     Nitrite, Urine POSITIVE (*)     Leukocyte Esterase, Urine LARGE (*)     All other components within normal limits   CBC WITH AUTO DIFFERENTIAL - Abnormal; Notable for the following components:    RBC 3.67 (*)     .3 (*)     MCH 32.7 (*)     MCHC 31.7 (*)     RDW 14.9 (*)     MPV 9.0 (*)     Neutrophils % 67.1 (*)     Lymphocytes % 17.7 (*)     Monocytes % 12.2 (*)     Monocytes Absolute 1.20 (*)     All other components within normal limits   COMPREHENSIVE METABOLIC PANEL W/ REFLEX TO MG FOR LOW K - Abnormal; Notable for the following components:    CO2 31 (*)     All other components within normal limits   BLOOD GAS, ARTERIAL - Abnormal; Notable for the following components:    pCO2, Arterial 46.0 (*)     pO2, Arterial 67.0 (*)     HCO3, Arterial 31.2 (*)     Base Excess, Arterial 6.2 (*)     Hemoglobin, Art, Extended 11.7 (*)     O2 Sat, Arterial 89.9 (*)     Methemoglobin, Arterial 3.8 (*)     All other components within normal limits   MICROSCOPIC URINALYSIS - Abnormal; Notable for the following components:    WBC, UA TNTC (*)     Bacteria, UA 4+ (*)     All other components within normal limits   COVID-19, RAPID   CULTURE, URINE   CK       All other labs were within normal range or notreturned as of this dictation. RE-ASSESSMENT          EMERGENCY DEPARTMENT COURSE and DIFFERENTIAL DIAGNOSIS/MDM:   Vitals:    Vitals:    10/23/22 2219 10/24/22 0115 10/24/22 0336   BP: (!) 172/87 (!) 142/86 109/60   Pulse: 84 85 84   Resp: 16 15 16   Temp: 98.2 °F (36.8 °C)  98 °F (36.7 °C)   SpO2: 93% 91% 90%   Weight: 140 lb (63.5 kg)     Height: 5' 6\" (1.676 m)           MDM  I am passing care over to Dr Adkins Silence will discuss case for failure outpatient tx potential and oxygen concerns. Potential for admission. PROCEDURES:    Procedures      FINAL IMPRESSION      1. Acute cystitis without hematuria    2. Fall, initial encounter          DISPOSITION/PLAN   DISPOSITION Decision To Discharge 10/24/2022 03:22:40 AM      PATIENT REFERRED TO:  No follow-up provider specified.     DISCHARGE MEDICATIONS:  Discharge Medication List as of 10/24/2022  3:53 AM          (Please note that portions of this note were completed with a voice recognition program.  Efforts were made to edit the dictations but occasionallywords are mis-transcribed.)    Les Puentes, [address] [place] [address]  10/25/22 4837

## 2022-10-24 NOTE — ED NOTES
Pt discharged back to [facility] with IV for IV abx's to be received at [facility], RN  10/24/22 0018

## 2022-10-24 NOTE — ED PROVIDER NOTES
[facility]Y DEPT  eMERGENCY dEPARTMENT eNCOUnter      Pt Name: Ulyess Litten  MRN: [id number]  [place] 5/22/1932  Date of evaluation: 10/23/2022  Provider: Rema Xiao MD    CHIEF COMPLAINT       Chief Complaint   Patient presents with    Fall     Swelling to back of head noted as well as bruising to jin cheeks that could be old bruises         HISTORY OF PRESENT ILLNESS   (Location/Symptom, Timing/Onset,Context/Setting, Quality, Duration, Modifying Factors, Severity)  Note limiting factors. Ulyess Litten is a 80 y.o. female who presents to the emergency department with fall. DAVON Liang signs over patient due to end of shift. Patient has fallen multiple times and she tries to get up out of bed but she does not walk. Urinalysis pending at time of Yakov's departure. Urinalysis with too numerous to count white blood cells. Urine culture sent. Purnima Dylan started as patient just completed course of Rocephin 1 g IM x7 days. Patient also takes Bactrim. HPI    NursingNotes were reviewed. Review of Systems:    Unable to complete ROS due to dementia.     PAST MEDICALHISTORY     Past Medical History:   Diagnosis Date    Alzheimer's dementia ([address]     Anxiety     Hypertension     Repeated falls          SURGICAL HISTORY       Past Surgical History:   Procedure Laterality Date    EYE SURGERY      HIP SURGERY           CURRENT MEDICATIONS     Previous Medications    ACETAMINOPHEN (TYLENOL) 325 MG TABLET    Take 2 tablets by mouth every 8 hours as needed for Pain (For temp greater than 100.5 F (38 C))    CALCIUM CARBONATE 1500 (600 CA) MG TABS TABLET    Take 600 mg by mouth 2 times daily (with meals)    CEFTRIAXONE (ROCEPHIN) 1 G INJECTION    Inject 1,000 mg into the muscle every 24 hours    MELATONIN PO    Take by mouth    MIRTAZAPINE (REMERON) 15 MG TABLET    Take 15 mg by mouth nightly    PROLIA 60 MG/ML SOSY SC INJECTION        SULFAMETHOXAZOLE-TRIMETHOPRIM (BACTRIM DS;SEPTRA DS) 800-160 MG PER TABLET           ALLERGIES     Morphine and Penicillins    FAMILY HISTORY     No family history on file. SOCIAL HISTORY       Social History     Socioeconomic History    Marital status:    Tobacco Use    Smoking status: Never    Smokeless tobacco: Never   Substance and Sexual Activity    Alcohol use: No    Drug use: No       SCREENINGS    Pinckard Coma Scale  Eye Opening: Spontaneous  Best Verbal Response: Confused  Best Motor Response: Obeys commands  Pinckard Coma Scale Score: 14        PHYSICAL EXAM    (up to 7 for level 4, 8 or more for level 5)     ED Triage Vitals [10/23/22 2219]   BP Temp Temp src Heart Rate Resp SpO2 Height Weight   (!) 172/87 98.2 °F (36.8 °C) -- 84 16 93 % 5' 6\" (1.676 m) 140 lb (63.5 kg)       Physical Exam  Vitals and nursing note reviewed. Constitutional:       General: She is not in acute distress. Appearance: Normal appearance. Comments: Patient is awake and active. Patient is trying to get out of bed here at the hospital.  Patient is scooted up in bed and distracted from her efforts. Patient is in no acute distress. SPO2 is 94% on room air   HENT:      Head: Normocephalic. Comments: Bruises on face are fading from prior fall. Slight swelling over occiput but patient states nontender. Right Ear: External ear normal.      Left Ear: External ear normal.      Nose: Nose normal.      Mouth/Throat:      Mouth: Mucous membranes are moist.      Pharynx: Oropharynx is clear. No oropharyngeal exudate. Eyes:      General: No scleral icterus. Conjunctiva/sclera: Conjunctivae normal.      Pupils: Pupils are equal, round, and reactive to light. Cardiovascular:      Rate and Rhythm: Normal rate and regular rhythm. Pulses: Normal pulses. Heart sounds: Normal heart sounds. Pulmonary:      Effort: Pulmonary effort is normal. No respiratory distress. Breath sounds: No stridor. No wheezing, rhonchi or rales.    Chest:      Chest wall: No tenderness. Abdominal:      General: Bowel sounds are normal. There is no distension. Palpations: Abdomen is soft. Tenderness: There is no abdominal tenderness. There is no guarding or rebound. Musculoskeletal:         General: No tenderness or deformity. Cervical back: Neck supple. No rigidity. Right lower leg: No edema. Left lower leg: No edema. Skin:     General: Skin is warm and dry. Capillary Refill: Capillary refill takes less than 2 seconds. Coloration: Skin is not jaundiced. Neurological:      General: No focal deficit present. Mental Status: She is alert and oriented to person, place, and time. Mental status is at baseline. Cranial Nerves: Cranial nerve deficit (Hearing deficit) present. Motor: Weakness (Generalized) present. Coordination: Coordination normal.   Psychiatric:         Mood and Affect: Mood normal.         Speech: Speech normal.         Behavior: Behavior is agitated (Mildly). Cognition and Memory: Cognition is impaired. Memory is impaired. DIAGNOSTIC RESULTS       RADIOLOGY:  Non-plain film images such as CT, Ultrasound and MRI are read by the radiologist. Plain radiographic images are visualized and preliminarily interpreted bythe emergency physician with the below findings:          XR PELVIS (1-2 VIEWS)   Final Result   No acute findings. Electronically signed by Chet Fairchild MD on 10-23-22 at 2342      XR ACUTE ABD SERIES CHEST 1 VW   Final Result   Marked generalized increase in stool throughout the colon and rectum. Electronically signed by Chet Fairchild MD on 10-23-22 at 2345      CT HEAD WO CONTRAST   Final Result      CT CERVICAL SPINE WO CONTRAST   Final Result   No acute fracture or traumatic malalignment. Electronically signed by Haley Ely MD on 10-23-22 at [address] TO CULTURE - Abnormal; Notable for the following components:       Result Patient given Merrem 1 g IV in the emergency department. Isaura Base will be prescribed for use at nursing home. Nursing home to recheck urine following a full course of Merrem. Physician assistant acting as PCP will follow this result. Discussed with family who verbalizes understanding and agreement with plan of care. CONSULTS:  None    PROCEDURES:  Unless otherwise noted below, none     Procedures    FINAL IMPRESSION      1. Acute cystitis without hematuria    2. Fall, initial encounter          DISPOSITION/PLAN   DISPOSITION Decision To Discharge 10/24/2022 03:22:40 AM      PATIENT REFERRED TO:  No follow-up provider specified.     DISCHARGE MEDICATIONS:  New Prescriptions    No medications on file          (Please note that portions of this note were completed with a voice recognition program.  Efforts were made to edit thedictations but occasionally words are mis-transcribed.)    Saul Lopez MD (electronically signed)  Attending Emergency Physician         Saul Lopez MD  11/01/22 8005

## 2022-10-24 NOTE — DISCHARGE INSTRUCTIONS
Return with increasing weakness, persistent vomiting, abdominal pain, or other concerns. InVanz 1gm IV Q24hrs x 7 days prescribed by Dr Kvng Quinn.

## 2022-10-26 LAB
ORGANISM: ABNORMAL
ORGANISM: ABNORMAL
URINE CULTURE, ROUTINE: ABNORMAL

## 2022-11-14 ENCOUNTER — TELEPHONE (OUTPATIENT)
Dept: FAMILY MEDICINE CLINIC | Age: 87
End: 2022-11-14

## 2022-11-14 NOTE — TELEPHONE ENCOUNTER
[facility] patient - Patient's daughter, Edith Guillaume, called wanting to talk to you about the recurrent / chronic UTI's her mother is having. She has been researching and she wants to discuss medications such as hiprex or methenamine hippurate.

## 2023-01-01 ENCOUNTER — HOSPITAL ENCOUNTER (INPATIENT)
Age: 88
LOS: 2 days | Discharge: HOSPICE | DRG: 871 | End: 2023-05-03
Attending: EMERGENCY MEDICINE | Admitting: STUDENT IN AN ORGANIZED HEALTH CARE EDUCATION/TRAINING PROGRAM
Payer: MEDICARE

## 2023-01-01 ENCOUNTER — APPOINTMENT (OUTPATIENT)
Dept: CT IMAGING | Age: 88
DRG: 871 | End: 2023-01-01
Payer: MEDICARE

## 2023-01-01 ENCOUNTER — APPOINTMENT (OUTPATIENT)
Dept: GENERAL RADIOLOGY | Age: 88
DRG: 871 | End: 2023-01-01
Payer: MEDICARE

## 2023-01-01 VITALS
SYSTOLIC BLOOD PRESSURE: 158 MMHG | BODY MASS INDEX: 22.6 KG/M2 | TEMPERATURE: 97.9 F | RESPIRATION RATE: 30 BRPM | HEART RATE: 111 BPM | WEIGHT: 140 LBS | OXYGEN SATURATION: 92 % | DIASTOLIC BLOOD PRESSURE: 103 MMHG

## 2023-01-01 DIAGNOSIS — R41.82 ALTERED MENTAL STATUS, UNSPECIFIED ALTERED MENTAL STATUS TYPE: Primary | ICD-10-CM

## 2023-01-01 DIAGNOSIS — A41.9 SEPSIS SECONDARY TO UTI (HCC): ICD-10-CM

## 2023-01-01 DIAGNOSIS — J96.01 ACUTE RESPIRATORY FAILURE WITH HYPOXIA (HCC): ICD-10-CM

## 2023-01-01 DIAGNOSIS — J90 PLEURAL EFFUSION ON RIGHT: ICD-10-CM

## 2023-01-01 DIAGNOSIS — D72.829 LEUKOCYTOSIS, UNSPECIFIED TYPE: ICD-10-CM

## 2023-01-01 DIAGNOSIS — N39.0 SEPSIS SECONDARY TO UTI (HCC): ICD-10-CM

## 2023-01-01 LAB
A BAUMANNII DNA BLD POS QL NAA+NON-PROBE: NOT DETECTED
ALBUMIN SERPL-MCNC: 2.3 G/DL (ref 3.5–5.2)
ALBUMIN SERPL-MCNC: 2.5 G/DL (ref 3.5–5.2)
ALBUMIN SERPL-MCNC: 3 G/DL (ref 3.5–5.2)
ALLENS TEST: ABNORMAL
ALP SERPL-CCNC: 109 U/L (ref 35–104)
ALP SERPL-CCNC: 112 U/L (ref 35–104)
ALP SERPL-CCNC: 91 U/L (ref 35–104)
ALT SERPL-CCNC: 12 U/L (ref 5–33)
ALT SERPL-CCNC: 13 U/L (ref 5–33)
ALT SERPL-CCNC: 9 U/L (ref 5–33)
ANION GAP SERPL CALCULATED.3IONS-SCNC: 10 MMOL/L (ref 7–19)
ANION GAP SERPL CALCULATED.3IONS-SCNC: 10 MMOL/L (ref 7–19)
ANION GAP SERPL CALCULATED.3IONS-SCNC: 16 MMOL/L (ref 7–19)
AST SERPL-CCNC: 11 U/L (ref 5–32)
AST SERPL-CCNC: 15 U/L (ref 5–32)
AST SERPL-CCNC: 20 U/L (ref 5–32)
B PARAP IS1001 DNA NPH QL NAA+NON-PROBE: NOT DETECTED
B PERT.PT PRMT NPH QL NAA+NON-PROBE: NOT DETECTED
BACTERIA BLD CULT ORG #2: NORMAL
BACTERIA BLD CULT: ABNORMAL
BACTERIA BLD CULT: NORMAL
BACTERIA UR CULT: ABNORMAL
BASE EXCESS ARTERIAL: 4.7 MMOL/L (ref -2–2)
BASOPHILS # BLD: 0 K/UL (ref 0–0.2)
BASOPHILS NFR BLD: 0 % (ref 0–1)
BILIRUB SERPL-MCNC: 0.3 MG/DL (ref 0.2–1.2)
BILIRUB UR QL STRIP: ABNORMAL
BNP BLD-MCNC: 3352 PG/ML (ref 0–449)
BUN SERPL-MCNC: 20 MG/DL (ref 8–23)
BUN SERPL-MCNC: 25 MG/DL (ref 8–23)
BUN SERPL-MCNC: 31 MG/DL (ref 8–23)
C ALBICANS DNA BLD POS QL NAA+NON-PROBE: NOT DETECTED
C AURIS DNA BLD POS QL NAA+PROBE: NOT DETECTED
C GLABRATA DNA BLD POS QL NAA+NON-PROBE: NOT DETECTED
C KRUSEI DNA BLD POS QL NAA+NON-PROBE: NOT DETECTED
C PARAP DNA BLD POS QL NAA+NON-PROBE: NOT DETECTED
C PNEUM DNA NPH QL NAA+NON-PROBE: NOT DETECTED
C TROPICLS DNA BLD POS QL NAA+NON-PROBE: NOT DETECTED
CALCIUM SERPL-MCNC: 8.2 MG/DL (ref 8.8–10.2)
CALCIUM SERPL-MCNC: 8.3 MG/DL (ref 8.8–10.2)
CALCIUM SERPL-MCNC: 9 MG/DL (ref 8.8–10.2)
CARBOXYHEMOGLOBIN ARTERIAL: 2.2 % (ref 0–5)
CHARACTER UR: ABNORMAL
CHLORIDE SERPL-SCNC: 100 MMOL/L (ref 98–111)
CHLORIDE SERPL-SCNC: 106 MMOL/L (ref 98–111)
CHLORIDE SERPL-SCNC: 109 MMOL/L (ref 98–111)
CLARITY UR: ABNORMAL
CO2 SERPL-SCNC: 20 MMOL/L (ref 22–29)
CO2 SERPL-SCNC: 24 MMOL/L (ref 22–29)
CO2 SERPL-SCNC: 25 MMOL/L (ref 22–29)
COLOR UR: ABNORMAL
CREAT SERPL-MCNC: 0.6 MG/DL (ref 0.5–0.9)
CREAT SERPL-MCNC: 0.8 MG/DL (ref 0.5–0.9)
CREAT SERPL-MCNC: 1 MG/DL (ref 0.5–0.9)
CRYPTOCOCCUS NEOFORMANS/GATTII BY PCR: NOT DETECTED
D DIMER PPP FEU-MCNC: 2.63 UG/ML FEU (ref 0–0.48)
E CLOAC COMP DNA BLD POS NAA+NON-PROBE: NOT DETECTED
E COLI DNA BLD POS QL NAA+NON-PROBE: NOT DETECTED
E FAECALIS DNA BLD POS QL NAA+PROBE: NOT DETECTED
E FAECIUM DNA BLD POS QL NAA+PROBE: NOT DETECTED
EKG P AXIS: 67 DEGREES
EKG P-R INTERVAL: 158 MS
EKG Q-T INTERVAL: 238 MS
EKG QRS DURATION: 74 MS
EKG QTC CALCULATION (BAZETT): 309 MS
EKG T AXIS: 44 DEGREES
ENTEROBACT DNA BLD POS QL NAA+NON-PROBE: NOT DETECTED
ENTEROCOC DNA BLD POS QL NAA+NON-PROBE: NOT DETECTED
EOSINOPHIL # BLD: 0 K/UL (ref 0–0.6)
EOSINOPHIL NFR BLD: 0 % (ref 0–5)
ERYTHROCYTE [DISTWIDTH] IN BLOOD BY AUTOMATED COUNT: 14.5 % (ref 11.5–14.5)
ERYTHROCYTE [DISTWIDTH] IN BLOOD BY AUTOMATED COUNT: 14.6 % (ref 11.5–14.5)
ERYTHROCYTE [DISTWIDTH] IN BLOOD BY AUTOMATED COUNT: 14.9 % (ref 11.5–14.5)
FLUAV RNA NPH QL NAA+NON-PROBE: NOT DETECTED
FLUBV RNA NPH QL NAA+NON-PROBE: NOT DETECTED
GLUCOSE SERPL-MCNC: 119 MG/DL (ref 74–109)
GLUCOSE SERPL-MCNC: 159 MG/DL (ref 74–109)
GLUCOSE SERPL-MCNC: 202 MG/DL (ref 74–109)
GLUCOSE UR STRIP.AUTO-MCNC: NEGATIVE MG/DL
GN BLD CULTURE PNL BLD POS NAA+PROBE: NOT DETECTED
GP B STREP DNA BLD POS QL NAA+NON-PROBE: NOT DETECTED
HADV DNA NPH QL NAA+NON-PROBE: NOT DETECTED
HCO3 ARTERIAL: 28.2 MMOL/L (ref 22–26)
HCOV 229E RNA NPH QL NAA+NON-PROBE: NOT DETECTED
HCOV HKU1 RNA NPH QL NAA+NON-PROBE: NOT DETECTED
HCOV NL63 RNA NPH QL NAA+NON-PROBE: NOT DETECTED
HCOV OC43 RNA NPH QL NAA+NON-PROBE: NOT DETECTED
HCT VFR BLD AUTO: 30.9 % (ref 37–47)
HCT VFR BLD AUTO: 32.6 % (ref 37–47)
HCT VFR BLD AUTO: 38.1 % (ref 37–47)
HEMOGLOBIN, ART, EXTENDED: 11.1 G/DL (ref 12–16)
HGB BLD-MCNC: 10.3 G/DL (ref 12–16)
HGB BLD-MCNC: 11.6 G/DL (ref 12–16)
HGB BLD-MCNC: 9.9 G/DL (ref 12–16)
HGB UR STRIP.AUTO-MCNC: ABNORMAL MG/L
HMPV RNA NPH QL NAA+NON-PROBE: NOT DETECTED
HPIV1 RNA NPH QL NAA+NON-PROBE: NOT DETECTED
HPIV2 RNA NPH QL NAA+NON-PROBE: NOT DETECTED
HPIV3 RNA NPH QL NAA+NON-PROBE: NOT DETECTED
HPIV4 RNA NPH QL NAA+NON-PROBE: NOT DETECTED
HYPOCHROMIA BLD QL SMEAR: ABNORMAL
HYPOCHROMIA BLD QL SMEAR: ABNORMAL
IMM GRANULOCYTES # BLD: 2.4 K/UL
IMM GRANULOCYTES # BLD: 2.5 K/UL
IMM GRANULOCYTES # BLD: 3.1 K/UL
INR PPP: 1.22 (ref 0.88–1.18)
K OXYTOCA DNA BLD POS QL NAA+NON-PROBE: NOT DETECTED
K PNEUMON DNA SPEC QL NAA+PROBE: NOT DETECTED
K. AEROGENES DNA SPEC QL NAA+PROBE: NOT DETECTED
KETONES UR STRIP.AUTO-MCNC: ABNORMAL MG/DL
L MONOCYTOG DNA BLD POS QL NAA+NON-PROBE: NOT DETECTED
LACTATE BLDV-SCNC: 1.1 MMOL/L (ref 0.5–1.9)
LACTATE BLDV-SCNC: 1.8 MMOL/L (ref 0.5–1.9)
LACTATE BLDV-SCNC: 2.1 MG/DL (ref 0.5–1.9)
LEGIONELLA AG UR QL: NORMAL
LEUKOCYTE ESTERASE UR QL STRIP.AUTO: ABNORMAL
LIPASE SERPL-CCNC: 13 U/L (ref 13–60)
LYMPHOCYTES # BLD: 1.8 K/UL (ref 1.1–4.5)
LYMPHOCYTES # BLD: 2 K/UL (ref 1.1–4.5)
LYMPHOCYTES # BLD: 4.3 K/UL (ref 1.1–4.5)
LYMPHOCYTES NFR BLD: 3 % (ref 20–40)
LYMPHOCYTES NFR BLD: 4 % (ref 20–40)
LYMPHOCYTES NFR BLD: 6 % (ref 20–40)
M PNEUMO DNA NPH QL NAA+NON-PROBE: NOT DETECTED
MACROCYTES BLD QL SMEAR: ABNORMAL
MCH RBC QN AUTO: 32.4 PG (ref 27–31)
MCH RBC QN AUTO: 32.5 PG (ref 27–31)
MCH RBC QN AUTO: 32.6 PG (ref 27–31)
MCHC RBC AUTO-ENTMCNC: 30.4 G/DL (ref 33–37)
MCHC RBC AUTO-ENTMCNC: 31.6 G/DL (ref 33–37)
MCHC RBC AUTO-ENTMCNC: 32 G/DL (ref 33–37)
MCV RBC AUTO: 101 FL (ref 81–99)
MCV RBC AUTO: 103.2 FL (ref 81–99)
MCV RBC AUTO: 106.7 FL (ref 81–99)
METHEMOGLOBIN ARTERIAL: 1.5 %
MONOCYTES # BLD: 2.1 K/UL (ref 0–0.9)
MONOCYTES # BLD: 6.5 K/UL (ref 0–0.9)
MONOCYTES # BLD: 6.8 K/UL (ref 0–0.9)
MONOCYTES NFR BLD: 11 % (ref 0–10)
MONOCYTES NFR BLD: 14 % (ref 0–10)
MONOCYTES NFR BLD: 3 % (ref 0–10)
N MEN DNA BLD POS QL NAA+NON-PROBE: NOT DETECTED
NEUTROPHILS # BLD: 40 K/UL (ref 1.5–7.5)
NEUTROPHILS # BLD: 50.7 K/UL (ref 1.5–7.5)
NEUTROPHILS # BLD: 64.7 K/UL (ref 1.5–7.5)
NEUTS BAND NFR BLD MANUAL: 2 % (ref 0–5)
NEUTS SEG NFR BLD: 82 % (ref 50–65)
NEUTS SEG NFR BLD: 86 % (ref 50–65)
NEUTS SEG NFR BLD: 89 % (ref 50–65)
NITRITE UR QL STRIP.AUTO: NEGATIVE
O2 CONTENT ARTERIAL: 14.4 ML/DL
O2 DELIVERY DEVICE: ABNORMAL
O2 SAT, ARTERIAL: 92.2 %
O2 THERAPY: ABNORMAL
ORGANISM: ABNORMAL
OVALOCYTES BLD QL SMEAR: ABNORMAL
OVALOCYTES BLD QL SMEAR: ABNORMAL
OXYGEN FLOW: 4
P AERUGINOSA DNA BLD POS NAA+NON-PROBE: NOT DETECTED
PCO2 ARTERIAL: 37 MMHG (ref 35–45)
PH ARTERIAL: 7.49 (ref 7.35–7.45)
PH UR STRIP.AUTO: 5.5 [PH] (ref 5–8)
PLATELET # BLD AUTO: 401 K/UL (ref 130–400)
PLATELET # BLD AUTO: 416 K/UL (ref 130–400)
PLATELET # BLD AUTO: 417 K/UL (ref 130–400)
PLATELET SLIDE REVIEW: ABNORMAL
PMV BLD AUTO: 9.3 FL (ref 9.4–12.3)
PMV BLD AUTO: 9.5 FL (ref 9.4–12.3)
PMV BLD AUTO: 9.6 FL (ref 9.4–12.3)
PO2 ARTERIAL: 69 MMHG (ref 80–100)
POIKILOCYTOSIS BLD QL SMEAR: ABNORMAL
POLYCHROMASIA BLD QL SMEAR: ABNORMAL
POLYCHROMASIA BLD QL SMEAR: ABNORMAL
POTASSIUM BLD-SCNC: 4.1 MMOL/L
POTASSIUM SERPL-SCNC: 3.7 MMOL/L (ref 3.5–5)
POTASSIUM SERPL-SCNC: 3.9 MMOL/L (ref 3.5–5)
POTASSIUM SERPL-SCNC: 4.7 MMOL/L (ref 3.5–5)
PROCALCITONIN: 0.74 NG/ML (ref 0–0.09)
PROCALCITONIN: 1.25 NG/ML (ref 0–0.09)
PROT SERPL-MCNC: 6 G/DL (ref 6.6–8.7)
PROT SERPL-MCNC: 6.4 G/DL (ref 6.6–8.7)
PROT SERPL-MCNC: 6.4 G/DL (ref 6.6–8.7)
PROT UR STRIP.AUTO-MCNC: 300 MG/DL
PROTEUS SP DNA BLD POS QL NAA+NON-PROBE: NOT DETECTED
PROTHROMBIN TIME: 15.4 SEC (ref 12–14.6)
RBC # BLD AUTO: 3.06 M/UL (ref 4.2–5.4)
RBC # BLD AUTO: 3.16 M/UL (ref 4.2–5.4)
RBC # BLD AUTO: 3.57 M/UL (ref 4.2–5.4)
RSV RNA NPH QL NAA+NON-PROBE: NOT DETECTED
RV+EV RNA NPH QL NAA+NON-PROBE: NOT DETECTED
S AUREUS DNA BLD POS QL NAA+NON-PROBE: NOT DETECTED
S AUREUS+CONS DNA BLD POS NAA+NON-PROBE: NOT DETECTED
S EPIDERMIDIS DNA BLD POS QL NAA+PROBE: NOT DETECTED
S LUGDUNENSIS DNA BLD POS QL NAA+PROBE: NOT DETECTED
S MALTOPH DNA BLD POS QL NAA+PROBE: NOT DETECTED
S MARCESCENS DNA BLD POS NAA+NON-PROBE: NOT DETECTED
S PNEUM AG SPEC QL: NORMAL
S PNEUM DNA BLD POS QL NAA+NON-PROBE: NOT DETECTED
S PYO DNA BLD POS QL NAA+NON-PROBE: DETECTED
SALMONELLA DNA BLD POS QL NAA+PROBE: NOT DETECTED
SAMPLE SOURCE: ABNORMAL
SARS-COV-2 RNA NPH QL NAA+NON-PROBE: NOT DETECTED
SODIUM SERPL-SCNC: 136 MMOL/L (ref 136–145)
SODIUM SERPL-SCNC: 140 MMOL/L (ref 136–145)
SODIUM SERPL-SCNC: 144 MMOL/L (ref 136–145)
SP GR UR STRIP.AUTO: >=1.045 (ref 1–1.03)
STREPTOCOCCUS DNA BLD POS NAA+NON-PROBE: DETECTED
UROBILINOGEN UR STRIP.AUTO-MCNC: 1 E.U./DL
VANCOMYCIN SERPL-MCNC: 12 UG/ML
WBC # BLD AUTO: 48.8 K/UL (ref 4.8–10.8)
WBC # BLD AUTO: 58.9 K/UL (ref 4.8–10.8)
WBC # BLD AUTO: 71.1 K/UL (ref 4.8–10.8)
WBC #/AREA URNS HPF: ABNORMAL /HPF (ref 0–5)

## 2023-01-01 PROCEDURE — APPSS15 APP SPLIT SHARED TIME 0-15 MINUTES: Performed by: NURSE PRACTITIONER

## 2023-01-01 PROCEDURE — 85025 COMPLETE CBC W/AUTO DIFF WBC: CPT

## 2023-01-01 PROCEDURE — 87077 CULTURE AEROBIC IDENTIFY: CPT

## 2023-01-01 PROCEDURE — 81270 JAK2 GENE: CPT

## 2023-01-01 PROCEDURE — 87040 BLOOD CULTURE FOR BACTERIA: CPT

## 2023-01-01 PROCEDURE — 80053 COMPREHEN METABOLIC PANEL: CPT

## 2023-01-01 PROCEDURE — C9113 INJ PANTOPRAZOLE SODIUM, VIA: HCPCS

## 2023-01-01 PROCEDURE — 71045 X-RAY EXAM CHEST 1 VIEW: CPT

## 2023-01-01 PROCEDURE — 6360000002 HC RX W HCPCS

## 2023-01-01 PROCEDURE — 92522 EVALUATE SPEECH PRODUCTION: CPT

## 2023-01-01 PROCEDURE — 83880 ASSAY OF NATRIURETIC PEPTIDE: CPT

## 2023-01-01 PROCEDURE — 99285 EMERGENCY DEPT VISIT HI MDM: CPT

## 2023-01-01 PROCEDURE — 2580000003 HC RX 258: Performed by: EMERGENCY MEDICINE

## 2023-01-01 PROCEDURE — 99233 SBSQ HOSP IP/OBS HIGH 50: CPT

## 2023-01-01 PROCEDURE — 2580000003 HC RX 258

## 2023-01-01 PROCEDURE — 2580000003 HC RX 258: Performed by: STUDENT IN AN ORGANIZED HEALTH CARE EDUCATION/TRAINING PROGRAM

## 2023-01-01 PROCEDURE — 83605 ASSAY OF LACTIC ACID: CPT

## 2023-01-01 PROCEDURE — 6360000002 HC RX W HCPCS: Performed by: STUDENT IN AN ORGANIZED HEALTH CARE EDUCATION/TRAINING PROGRAM

## 2023-01-01 PROCEDURE — 05HC33Z INSERTION OF INFUSION DEVICE INTO LEFT BASILIC VEIN, PERCUTANEOUS APPROACH: ICD-10-PCS | Performed by: STUDENT IN AN ORGANIZED HEALTH CARE EDUCATION/TRAINING PROGRAM

## 2023-01-01 PROCEDURE — 36415 COLL VENOUS BLD VENIPUNCTURE: CPT

## 2023-01-01 PROCEDURE — 94760 N-INVAS EAR/PLS OXIMETRY 1: CPT

## 2023-01-01 PROCEDURE — 6360000004 HC RX CONTRAST MEDICATION: Performed by: EMERGENCY MEDICINE

## 2023-01-01 PROCEDURE — 76937 US GUIDE VASCULAR ACCESS: CPT

## 2023-01-01 PROCEDURE — 71275 CT ANGIOGRAPHY CHEST: CPT

## 2023-01-01 PROCEDURE — 81001 URINALYSIS AUTO W/SCOPE: CPT

## 2023-01-01 PROCEDURE — 99222 1ST HOSP IP/OBS MODERATE 55: CPT | Performed by: INTERNAL MEDICINE

## 2023-01-01 PROCEDURE — 36600 WITHDRAWAL OF ARTERIAL BLOOD: CPT

## 2023-01-01 PROCEDURE — 93005 ELECTROCARDIOGRAM TRACING: CPT | Performed by: EMERGENCY MEDICINE

## 2023-01-01 PROCEDURE — 36410 VNPNXR 3YR/> PHY/QHP DX/THER: CPT

## 2023-01-01 PROCEDURE — C1751 CATH, INF, PER/CENT/MIDLINE: HCPCS

## 2023-01-01 PROCEDURE — 87449 NOS EACH ORGANISM AG IA: CPT

## 2023-01-01 PROCEDURE — 94761 N-INVAS EAR/PLS OXIMETRY MLT: CPT

## 2023-01-01 PROCEDURE — 92610 EVALUATE SWALLOWING FUNCTION: CPT

## 2023-01-01 PROCEDURE — 2700000000 HC OXYGEN THERAPY PER DAY

## 2023-01-01 PROCEDURE — 0202U NFCT DS 22 TRGT SARS-COV-2: CPT

## 2023-01-01 PROCEDURE — 82803 BLOOD GASES ANY COMBINATION: CPT

## 2023-01-01 PROCEDURE — 84145 PROCALCITONIN (PCT): CPT

## 2023-01-01 PROCEDURE — 87186 SC STD MICRODIL/AGAR DIL: CPT

## 2023-01-01 PROCEDURE — 94640 AIRWAY INHALATION TREATMENT: CPT

## 2023-01-01 PROCEDURE — 83690 ASSAY OF LIPASE: CPT

## 2023-01-01 PROCEDURE — 99232 SBSQ HOSP IP/OBS MODERATE 35: CPT | Performed by: INTERNAL MEDICINE

## 2023-01-01 PROCEDURE — 6360000002 HC RX W HCPCS: Performed by: EMERGENCY MEDICINE

## 2023-01-01 PROCEDURE — 2500000003 HC RX 250 WO HCPCS: Performed by: STUDENT IN AN ORGANIZED HEALTH CARE EDUCATION/TRAINING PROGRAM

## 2023-01-01 PROCEDURE — 1210000000 HC MED SURG R&B

## 2023-01-01 PROCEDURE — 93010 ELECTROCARDIOGRAM REPORT: CPT | Performed by: INTERNAL MEDICINE

## 2023-01-01 PROCEDURE — 6370000000 HC RX 637 (ALT 250 FOR IP)

## 2023-01-01 PROCEDURE — 85610 PROTHROMBIN TIME: CPT

## 2023-01-01 PROCEDURE — 70450 CT HEAD/BRAIN W/O DYE: CPT

## 2023-01-01 PROCEDURE — 85379 FIBRIN DEGRADATION QUANT: CPT

## 2023-01-01 PROCEDURE — G0316 PR PROLONG INPT EVAL ADD15 M: HCPCS

## 2023-01-01 PROCEDURE — 51798 US URINE CAPACITY MEASURE: CPT

## 2023-01-01 PROCEDURE — 99223 1ST HOSP IP/OBS HIGH 75: CPT

## 2023-01-01 PROCEDURE — 87150 DNA/RNA AMPLIFIED PROBE: CPT

## 2023-01-01 PROCEDURE — 02HV33Z INSERTION OF INFUSION DEVICE INTO SUPERIOR VENA CAVA, PERCUTANEOUS APPROACH: ICD-10-PCS | Performed by: STUDENT IN AN ORGANIZED HEALTH CARE EDUCATION/TRAINING PROGRAM

## 2023-01-01 PROCEDURE — 87086 URINE CULTURE/COLONY COUNT: CPT

## 2023-01-01 PROCEDURE — 80202 ASSAY OF VANCOMYCIN: CPT

## 2023-01-01 RX ORDER — GLYCOPYRROLATE 0.2 MG/ML
0.1 INJECTION INTRAMUSCULAR; INTRAVENOUS EVERY 4 HOURS
Status: DISCONTINUED | OUTPATIENT
Start: 2023-01-01 | End: 2023-01-01 | Stop reason: HOSPADM

## 2023-01-01 RX ORDER — ARFORMOTEROL TARTRATE 15 UG/2ML
15 SOLUTION RESPIRATORY (INHALATION) 2 TIMES DAILY
OUTPATIENT
Start: 2023-01-01

## 2023-01-01 RX ORDER — POLYETHYLENE GLYCOL 3350 17 G/17G
17 POWDER, FOR SOLUTION ORAL EVERY OTHER DAY
COMMUNITY

## 2023-01-01 RX ORDER — ONDANSETRON 4 MG/1
4 TABLET, ORALLY DISINTEGRATING ORAL EVERY 8 HOURS PRN
Status: DISCONTINUED | OUTPATIENT
Start: 2023-01-01 | End: 2023-01-01 | Stop reason: HOSPADM

## 2023-01-01 RX ORDER — HALOPERIDOL 5 MG/ML
5 INJECTION INTRAMUSCULAR EVERY 6 HOURS PRN
Status: DISCONTINUED | OUTPATIENT
Start: 2023-01-01 | End: 2023-01-01 | Stop reason: HOSPADM

## 2023-01-01 RX ORDER — CARBAMIDE PEROXIDE 6.5 %
1 DROPS OTIC (EAR) PRN
COMMUNITY

## 2023-01-01 RX ORDER — ACETAMINOPHEN 650 MG/1
650 SUPPOSITORY RECTAL EVERY 6 HOURS PRN
Status: DISCONTINUED | OUTPATIENT
Start: 2023-01-01 | End: 2023-01-01 | Stop reason: HOSPADM

## 2023-01-01 RX ORDER — SCOLOPAMINE TRANSDERMAL SYSTEM 1 MG/1
1 PATCH, EXTENDED RELEASE TRANSDERMAL
Status: DISCONTINUED | OUTPATIENT
Start: 2023-01-01 | End: 2023-01-01 | Stop reason: HOSPADM

## 2023-01-01 RX ORDER — GLYCOPYRROLATE 0.2 MG/ML
0.1 INJECTION INTRAMUSCULAR; INTRAVENOUS EVERY 4 HOURS
OUTPATIENT
Start: 2023-01-01

## 2023-01-01 RX ORDER — ALBUTEROL SULFATE 2.5 MG/3ML
2.5 SOLUTION RESPIRATORY (INHALATION) EVERY 4 HOURS PRN
OUTPATIENT
Start: 2023-01-01

## 2023-01-01 RX ORDER — ONDANSETRON 4 MG/1
4 TABLET, ORALLY DISINTEGRATING ORAL EVERY 8 HOURS PRN
OUTPATIENT
Start: 2023-01-01

## 2023-01-01 RX ORDER — SODIUM CHLORIDE 9 MG/ML
INJECTION, SOLUTION INTRAVENOUS PRN
Status: DISCONTINUED | OUTPATIENT
Start: 2023-01-01 | End: 2023-01-01 | Stop reason: HOSPADM

## 2023-01-01 RX ORDER — ARFORMOTEROL TARTRATE 15 UG/2ML
15 SOLUTION RESPIRATORY (INHALATION) 2 TIMES DAILY
Status: DISCONTINUED | OUTPATIENT
Start: 2023-01-01 | End: 2023-01-01 | Stop reason: HOSPADM

## 2023-01-01 RX ORDER — SODIUM CHLORIDE 0.9 % (FLUSH) 0.9 %
5-40 SYRINGE (ML) INJECTION EVERY 12 HOURS SCHEDULED
Status: DISCONTINUED | OUTPATIENT
Start: 2023-01-01 | End: 2023-01-01 | Stop reason: SDUPTHER

## 2023-01-01 RX ORDER — ENOXAPARIN SODIUM 100 MG/ML
40 INJECTION SUBCUTANEOUS DAILY
Status: DISCONTINUED | OUTPATIENT
Start: 2023-01-01 | End: 2023-01-01 | Stop reason: HOSPADM

## 2023-01-01 RX ORDER — RISPERIDONE 1 MG/ML
1 SOLUTION ORAL EVERY 6 HOURS PRN
Status: DISCONTINUED | OUTPATIENT
Start: 2023-01-01 | End: 2023-01-01 | Stop reason: HOSPADM

## 2023-01-01 RX ORDER — HALOPERIDOL 5 MG/ML
5 INJECTION INTRAMUSCULAR EVERY 6 HOURS PRN
OUTPATIENT
Start: 2023-01-01

## 2023-01-01 RX ORDER — SODIUM CHLORIDE 0.9 % (FLUSH) 0.9 %
5-40 SYRINGE (ML) INJECTION EVERY 12 HOURS SCHEDULED
Status: DISCONTINUED | OUTPATIENT
Start: 2023-01-01 | End: 2023-01-01 | Stop reason: HOSPADM

## 2023-01-01 RX ORDER — SODIUM CHLORIDE 9 MG/ML
INJECTION, SOLUTION INTRAVENOUS PRN
OUTPATIENT
Start: 2023-01-01

## 2023-01-01 RX ORDER — FENTANYL CITRATE 50 UG/ML
25 INJECTION, SOLUTION INTRAMUSCULAR; INTRAVENOUS
OUTPATIENT
Start: 2023-01-01

## 2023-01-01 RX ORDER — ACETAMINOPHEN 325 MG/1
650 TABLET ORAL EVERY 6 HOURS PRN
Status: DISCONTINUED | OUTPATIENT
Start: 2023-01-01 | End: 2023-01-01 | Stop reason: HOSPADM

## 2023-01-01 RX ORDER — ONDANSETRON 2 MG/ML
4 INJECTION INTRAMUSCULAR; INTRAVENOUS EVERY 6 HOURS PRN
OUTPATIENT
Start: 2023-01-01

## 2023-01-01 RX ORDER — HYDROMORPHONE HYDROCHLORIDE 1 MG/ML
0.5 INJECTION, SOLUTION INTRAMUSCULAR; INTRAVENOUS; SUBCUTANEOUS ONCE
Status: COMPLETED | OUTPATIENT
Start: 2023-01-01 | End: 2023-01-01

## 2023-01-01 RX ORDER — SCOLOPAMINE TRANSDERMAL SYSTEM 1 MG/1
1 PATCH, EXTENDED RELEASE TRANSDERMAL
OUTPATIENT
Start: 2023-05-06

## 2023-01-01 RX ORDER — ONDANSETRON 2 MG/ML
4 INJECTION INTRAMUSCULAR; INTRAVENOUS EVERY 6 HOURS PRN
Status: DISCONTINUED | OUTPATIENT
Start: 2023-01-01 | End: 2023-01-01 | Stop reason: HOSPADM

## 2023-01-01 RX ORDER — POLYETHYLENE GLYCOL 3350 17 G/17G
17 POWDER, FOR SOLUTION ORAL DAILY PRN
OUTPATIENT
Start: 2023-01-01

## 2023-01-01 RX ORDER — SODIUM CHLORIDE 9 MG/ML
25 INJECTION, SOLUTION INTRAVENOUS PRN
Status: DISCONTINUED | OUTPATIENT
Start: 2023-01-01 | End: 2023-01-01 | Stop reason: ALTCHOICE

## 2023-01-01 RX ORDER — DOCUSATE SODIUM 100 MG/1
100 CAPSULE, LIQUID FILLED ORAL DAILY
COMMUNITY

## 2023-01-01 RX ORDER — SODIUM CHLORIDE 9 MG/ML
INJECTION, SOLUTION INTRAVENOUS PRN
Status: DISCONTINUED | OUTPATIENT
Start: 2023-01-01 | End: 2023-01-01 | Stop reason: SDUPTHER

## 2023-01-01 RX ORDER — SODIUM CHLORIDE, SODIUM LACTATE, POTASSIUM CHLORIDE, AND CALCIUM CHLORIDE .6; .31; .03; .02 G/100ML; G/100ML; G/100ML; G/100ML
1000 INJECTION, SOLUTION INTRAVENOUS ONCE
Status: COMPLETED | OUTPATIENT
Start: 2023-01-01 | End: 2023-01-01

## 2023-01-01 RX ORDER — SODIUM CHLORIDE 0.9 % (FLUSH) 0.9 %
5-40 SYRINGE (ML) INJECTION PRN
Status: DISCONTINUED | OUTPATIENT
Start: 2023-01-01 | End: 2023-01-01 | Stop reason: SDUPTHER

## 2023-01-01 RX ORDER — ACETAMINOPHEN 325 MG/1
650 TABLET ORAL EVERY 6 HOURS PRN
OUTPATIENT
Start: 2023-01-01

## 2023-01-01 RX ORDER — POLYETHYLENE GLYCOL 3350 17 G/17G
17 POWDER, FOR SOLUTION ORAL DAILY PRN
Status: DISCONTINUED | OUTPATIENT
Start: 2023-01-01 | End: 2023-01-01 | Stop reason: HOSPADM

## 2023-01-01 RX ORDER — ACETAMINOPHEN 650 MG/1
650 SUPPOSITORY RECTAL EVERY 6 HOURS PRN
OUTPATIENT
Start: 2023-01-01

## 2023-01-01 RX ORDER — IPRATROPIUM BROMIDE AND ALBUTEROL SULFATE 2.5; .5 MG/3ML; MG/3ML
1 SOLUTION RESPIRATORY (INHALATION)
Status: DISCONTINUED | OUTPATIENT
Start: 2023-01-01 | End: 2023-01-01

## 2023-01-01 RX ORDER — FUROSEMIDE 10 MG/ML
20 INJECTION INTRAMUSCULAR; INTRAVENOUS ONCE
Status: COMPLETED | OUTPATIENT
Start: 2023-01-01 | End: 2023-01-01

## 2023-01-01 RX ORDER — SODIUM CHLORIDE 0.9 % (FLUSH) 0.9 %
5-40 SYRINGE (ML) INJECTION PRN
Status: DISCONTINUED | OUTPATIENT
Start: 2023-01-01 | End: 2023-01-01 | Stop reason: HOSPADM

## 2023-01-01 RX ORDER — SODIUM CHLORIDE, SODIUM LACTATE, POTASSIUM CHLORIDE, AND CALCIUM CHLORIDE .6; .31; .03; .02 G/100ML; G/100ML; G/100ML; G/100ML
800 INJECTION, SOLUTION INTRAVENOUS ONCE
Status: COMPLETED | OUTPATIENT
Start: 2023-01-01 | End: 2023-01-01

## 2023-01-01 RX ORDER — FENTANYL CITRATE 50 UG/ML
25 INJECTION, SOLUTION INTRAMUSCULAR; INTRAVENOUS
Status: DISCONTINUED | OUTPATIENT
Start: 2023-01-01 | End: 2023-01-01 | Stop reason: HOSPADM

## 2023-01-01 RX ORDER — ALBUTEROL SULFATE 2.5 MG/3ML
2.5 SOLUTION RESPIRATORY (INHALATION) EVERY 4 HOURS PRN
Status: DISCONTINUED | OUTPATIENT
Start: 2023-01-01 | End: 2023-01-01 | Stop reason: HOSPADM

## 2023-01-01 RX ORDER — ACETYLCYSTEINE 200 MG/ML
600 SOLUTION ORAL; RESPIRATORY (INHALATION) 2 TIMES DAILY
Status: DISCONTINUED | OUTPATIENT
Start: 2023-01-01 | End: 2023-01-01 | Stop reason: HOSPADM

## 2023-01-01 RX ORDER — LIDOCAINE HYDROCHLORIDE 10 MG/ML
5 INJECTION, SOLUTION EPIDURAL; INFILTRATION; INTRACAUDAL; PERINEURAL ONCE
Status: DISCONTINUED | OUTPATIENT
Start: 2023-01-01 | End: 2023-01-01 | Stop reason: ALTCHOICE

## 2023-01-01 RX ORDER — MILK THISTLE 150 MG
4 CAPSULE ORAL DAILY
COMMUNITY

## 2023-01-01 RX ORDER — RISPERIDONE 1 MG/ML
1 SOLUTION ORAL EVERY 6 HOURS PRN
OUTPATIENT
Start: 2023-01-01

## 2023-01-01 RX ORDER — ACETYLCYSTEINE 200 MG/ML
600 SOLUTION ORAL; RESPIRATORY (INHALATION) 2 TIMES DAILY
OUTPATIENT
Start: 2023-01-01

## 2023-01-01 RX ORDER — DEXTROSE, SODIUM CHLORIDE, SODIUM LACTATE, POTASSIUM CHLORIDE, AND CALCIUM CHLORIDE 5; .6; .31; .03; .02 G/100ML; G/100ML; G/100ML; G/100ML; G/100ML
INJECTION, SOLUTION INTRAVENOUS CONTINUOUS
Status: DISCONTINUED | OUTPATIENT
Start: 2023-01-01 | End: 2023-01-01

## 2023-01-01 RX ADMIN — VANCOMYCIN HYDROCHLORIDE 1500 MG: 10 INJECTION, POWDER, LYOPHILIZED, FOR SOLUTION INTRAVENOUS at 15:29

## 2023-01-01 RX ADMIN — IPRATROPIUM BROMIDE 0.5 MG: 0.5 SOLUTION RESPIRATORY (INHALATION) at 07:50

## 2023-01-01 RX ADMIN — PIPERACILLIN AND TAZOBACTAM 4500 MG: 4; .5 INJECTION, POWDER, LYOPHILIZED, FOR SOLUTION INTRAVENOUS at 14:52

## 2023-01-01 RX ADMIN — MEROPENEM 1000 MG: 1 INJECTION, POWDER, FOR SOLUTION INTRAVENOUS at 12:34

## 2023-01-01 RX ADMIN — ENOXAPARIN SODIUM 40 MG: 100 INJECTION SUBCUTANEOUS at 10:13

## 2023-01-01 RX ADMIN — IOPAMIDOL 70 ML: 755 INJECTION, SOLUTION INTRAVENOUS at 14:27

## 2023-01-01 RX ADMIN — FENTANYL CITRATE 25 MCG: 50 INJECTION, SOLUTION INTRAMUSCULAR; INTRAVENOUS at 14:38

## 2023-01-01 RX ADMIN — SODIUM CHLORIDE, POTASSIUM CHLORIDE, SODIUM LACTATE AND CALCIUM CHLORIDE 800 ML: 600; 310; 30; 20 INJECTION, SOLUTION INTRAVENOUS at 19:17

## 2023-01-01 RX ADMIN — SODIUM CHLORIDE, PRESERVATIVE FREE 10 ML: 5 INJECTION INTRAVENOUS at 10:15

## 2023-01-01 RX ADMIN — VANCOMYCIN HYDROCHLORIDE 1000 MG: 10 INJECTION, POWDER, LYOPHILIZED, FOR SOLUTION INTRAVENOUS at 06:35

## 2023-01-01 RX ADMIN — HYDROMORPHONE HYDROCHLORIDE 0.5 MG: 1 INJECTION, SOLUTION INTRAMUSCULAR; INTRAVENOUS; SUBCUTANEOUS at 15:48

## 2023-01-01 RX ADMIN — IPRATROPIUM BROMIDE 0.5 MG: 0.5 SOLUTION RESPIRATORY (INHALATION) at 10:52

## 2023-01-01 RX ADMIN — IPRATROPIUM BROMIDE 0.5 MG: 0.5 SOLUTION RESPIRATORY (INHALATION) at 13:56

## 2023-01-01 RX ADMIN — IPRATROPIUM BROMIDE 0.5 MG: 0.5 SOLUTION RESPIRATORY (INHALATION) at 19:15

## 2023-01-01 RX ADMIN — ARFORMOTEROL TARTRATE 15 MCG: 15 SOLUTION RESPIRATORY (INHALATION) at 19:26

## 2023-01-01 RX ADMIN — SODIUM CHLORIDE, SODIUM LACTATE, POTASSIUM CHLORIDE, CALCIUM CHLORIDE AND DEXTROSE MONOHYDRATE: 5; 600; 310; 30; 20 INJECTION, SOLUTION INTRAVENOUS at 16:52

## 2023-01-01 RX ADMIN — ARFORMOTEROL TARTRATE 15 MCG: 15 SOLUTION RESPIRATORY (INHALATION) at 07:50

## 2023-01-01 RX ADMIN — IPRATROPIUM BROMIDE 0.5 MG: 0.5 SOLUTION RESPIRATORY (INHALATION) at 14:04

## 2023-01-01 RX ADMIN — SODIUM CHLORIDE, PRESERVATIVE FREE 40 MG: 5 INJECTION INTRAVENOUS at 08:52

## 2023-01-01 RX ADMIN — ALBUTEROL SULFATE 2.5 MG: 2.5 SOLUTION RESPIRATORY (INHALATION) at 07:32

## 2023-01-01 RX ADMIN — ENOXAPARIN SODIUM 40 MG: 100 INJECTION SUBCUTANEOUS at 08:52

## 2023-01-01 RX ADMIN — SODIUM CHLORIDE, PRESERVATIVE FREE 40 MG: 5 INJECTION INTRAVENOUS at 09:17

## 2023-01-01 RX ADMIN — SODIUM CHLORIDE, POTASSIUM CHLORIDE, SODIUM LACTATE AND CALCIUM CHLORIDE 1000 ML: 600; 310; 30; 20 INJECTION, SOLUTION INTRAVENOUS at 16:34

## 2023-01-01 RX ADMIN — HALOPERIDOL LACTATE 5 MG: 5 INJECTION, SOLUTION INTRAMUSCULAR at 07:47

## 2023-01-01 RX ADMIN — ACETYLCYSTEINE 600 MG: 200 INHALANT RESPIRATORY (INHALATION) at 10:58

## 2023-01-01 RX ADMIN — IPRATROPIUM BROMIDE 0.5 MG: 0.5 SOLUTION RESPIRATORY (INHALATION) at 10:58

## 2023-01-01 RX ADMIN — IPRATROPIUM BROMIDE 0.5 MG: 0.5 SOLUTION RESPIRATORY (INHALATION) at 06:29

## 2023-01-01 RX ADMIN — GLYCOPYRROLATE 0.1 MG: 0.2 INJECTION INTRAMUSCULAR; INTRAVENOUS at 15:48

## 2023-01-01 RX ADMIN — ALBUTEROL SULFATE 2.5 MG: 2.5 SOLUTION RESPIRATORY (INHALATION) at 17:26

## 2023-01-01 RX ADMIN — IPRATROPIUM BROMIDE 0.5 MG: 0.5 SOLUTION RESPIRATORY (INHALATION) at 19:26

## 2023-01-01 RX ADMIN — MEROPENEM 1000 MG: 1 INJECTION, POWDER, FOR SOLUTION INTRAVENOUS at 23:37

## 2023-01-01 RX ADMIN — MEROPENEM 1000 MG: 1 INJECTION, POWDER, FOR SOLUTION INTRAVENOUS at 01:21

## 2023-01-01 RX ADMIN — GLYCOPYRROLATE 0.1 MG: 0.2 INJECTION INTRAMUSCULAR; INTRAVENOUS at 09:17

## 2023-01-01 RX ADMIN — VANCOMYCIN HYDROCHLORIDE 1000 MG: 10 INJECTION, POWDER, LYOPHILIZED, FOR SOLUTION INTRAVENOUS at 05:44

## 2023-01-01 RX ADMIN — ALBUTEROL SULFATE 2.5 MG: 2.5 SOLUTION RESPIRATORY (INHALATION) at 01:38

## 2023-01-01 RX ADMIN — FUROSEMIDE 20 MG: 10 INJECTION, SOLUTION INTRAMUSCULAR; INTRAVENOUS at 10:07

## 2023-01-01 RX ADMIN — MEROPENEM 1000 MG: 1 INJECTION, POWDER, FOR SOLUTION INTRAVENOUS at 12:50

## 2023-01-01 RX ADMIN — GLYCOPYRROLATE 0.1 MG: 0.2 INJECTION INTRAMUSCULAR; INTRAVENOUS at 12:41

## 2023-01-01 RX ADMIN — ARFORMOTEROL TARTRATE 15 MCG: 15 SOLUTION RESPIRATORY (INHALATION) at 06:28

## 2023-01-01 RX ADMIN — ARFORMOTEROL TARTRATE 15 MCG: 15 SOLUTION RESPIRATORY (INHALATION) at 19:15

## 2023-01-22 ENCOUNTER — HOSPITAL ENCOUNTER (EMERGENCY)
Age: 88
Discharge: HOME | End: 2023-01-23
Payer: MEDICARE

## 2023-01-22 ENCOUNTER — APPOINTMENT (OUTPATIENT)
Dept: GENERAL RADIOLOGY | Age: 88
End: 2023-01-22
Payer: MEDICARE

## 2023-01-22 DIAGNOSIS — F02.818 LATE ONSET ALZHEIMER'S DEMENTIA WITH BEHAVIORAL DISTURBANCE (HCC): ICD-10-CM
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
## 2023-01-22 DIAGNOSIS — N30.00 ACUTE CYSTITIS WITHOUT HEMATURIA: ICD-10-CM

## 2023-01-22 DIAGNOSIS — G30.1 LATE ONSET ALZHEIMER'S DEMENTIA WITH BEHAVIORAL DISTURBANCE (HCC): ICD-10-CM

## 2023-01-22 DIAGNOSIS — S00.03XA HEMATOMA OF SCALP, INITIAL ENCOUNTER: ICD-10-CM

## 2023-01-22 PROCEDURE — 96374 THER/PROPH/DIAG INJ IV PUSH: CPT

## 2023-01-22 PROCEDURE — 99284 EMERGENCY DEPT VISIT MOD MDM: CPT

## 2023-01-22 ASSESSMENT — ENCOUNTER SYMPTOMS
APNEA: 0
SORE THROAT: 0
ABDOMINAL DISTENTION: 0
SHORTNESS OF BREATH: 0
ABDOMINAL PAIN: 0
RHINORRHEA: 0
EYE DISCHARGE: 0
COLOR CHANGE: 0
COUGH: 0
BACK PAIN: 0
PHOTOPHOBIA: 0
NAUSEA: 0
EYE PAIN: 0

## 2023-01-23 ENCOUNTER — APPOINTMENT (OUTPATIENT)
Dept: GENERAL RADIOLOGY | Age: 88
End: 2023-01-23
Payer: MEDICARE

## 2023-01-23 ENCOUNTER — APPOINTMENT (OUTPATIENT)
Dept: CT IMAGING | Age: 88
End: 2023-01-23
Payer: MEDICARE

## 2023-01-23 VITALS
DIASTOLIC BLOOD PRESSURE: 78 MMHG | OXYGEN SATURATION: 94 % | WEIGHT: 140 LBS | HEART RATE: 79 BPM | TEMPERATURE: 98 F | SYSTOLIC BLOOD PRESSURE: 148 MMHG | RESPIRATION RATE: 16 BRPM | BODY MASS INDEX: 22.6 KG/M2

## 2023-01-23 LAB
BACTERIA: ABNORMAL /HPF
BILIRUBIN URINE: NEGATIVE
BLOOD, URINE: NEGATIVE
CLARITY: CLEAR
COLOR: YELLOW
CRYSTALS, UA: ABNORMAL /HPF
EPITHELIAL CELLS, UA: 0 /HPF (ref 0–5)
GLUCOSE URINE: NEGATIVE MG/DL
HYALINE CASTS: 2 /HPF (ref 0–8)
KETONES, URINE: NEGATIVE MG/DL
LEUKOCYTE ESTERASE, URINE: ABNORMAL
NITRITE, URINE: POSITIVE
PH UA: 7 (ref 5–8)
PROTEIN UA: NEGATIVE MG/DL
RBC UA: 1 /HPF (ref 0–4)
SPECIFIC GRAVITY UA: 1.01 (ref 1–1.03)
UROBILINOGEN, URINE: 0.2 E.U./DL
WBC UA: 58 /HPF (ref 0–5)

## 2023-01-23 PROCEDURE — 6370000000 HC RX 637 (ALT 250 FOR IP): Performed by: PEDIATRICS

## 2023-01-23 PROCEDURE — 81001 URINALYSIS AUTO W/SCOPE: CPT

## 2023-01-23 PROCEDURE — 72170 X-RAY EXAM OF PELVIS: CPT

## 2023-01-23 PROCEDURE — 87186 SC STD MICRODIL/AGAR DIL: CPT

## 2023-01-23 PROCEDURE — 72170 X-RAY EXAM OF PELVIS: CPT | Performed by: RADIOLOGY

## 2023-01-23 PROCEDURE — 6360000002 HC RX W HCPCS: Performed by: PHYSICIAN ASSISTANT

## 2023-01-23 PROCEDURE — 2580000003 HC RX 258: Performed by: PHYSICIAN ASSISTANT

## 2023-01-23 PROCEDURE — 72125 CT NECK SPINE W/O DYE: CPT

## 2023-01-23 PROCEDURE — 72125 CT NECK SPINE W/O DYE: CPT | Performed by: RADIOLOGY

## 2023-01-23 PROCEDURE — 70450 CT HEAD/BRAIN W/O DYE: CPT

## 2023-01-23 PROCEDURE — 70450 CT HEAD/BRAIN W/O DYE: CPT | Performed by: RADIOLOGY

## 2023-01-23 PROCEDURE — 87086 URINE CULTURE/COLONY COUNT: CPT

## 2023-01-23 RX ORDER — ACETAMINOPHEN 325 MG/1
650 TABLET ORAL ONCE
Status: COMPLETED | OUTPATIENT
Start: 2023-01-23 | End: 2023-01-23

## 2023-01-23 RX ADMIN — ACETAMINOPHEN 650 MG: 325 TABLET ORAL at 02:54

## 2023-01-23 RX ADMIN — WATER 1000 MG: 1 INJECTION INTRAMUSCULAR; INTRAVENOUS; SUBCUTANEOUS at 01:22

## 2023-01-23 ASSESSMENT — PAIN DESCRIPTION - LOCATION: LOCATION: HEAD

## 2023-01-23 ASSESSMENT — PAIN DESCRIPTION - DESCRIPTORS: DESCRIPTORS: ACHING

## 2023-01-23 ASSESSMENT — PAIN SCALES - GENERAL: PAINLEVEL_OUTOF10: 5

## 2023-01-23 NOTE — DISCHARGE INSTRUCTIONS
Recommend medical director to order IV broad spectrum abx based on 4+ clean catch urine tonight and ESBL hx  Given merrem in ER  Sounds as though she has had multiple rounds of bactrim

## 2023-01-23 NOTE — ED NOTES
Abrasion and hematoma noted to right side of back of head, wound cleaned and left JOE, no bleeding noted.      Linda Pinto RN  01/23/23 0028

## 2023-01-23 NOTE — ED PROVIDER NOTES
[facility] EMERGENCY DEPT  eMERGENCYdEPARTMENT eNCOUnter      Pt Name: Geovanny Elkins  MRN: [id number]  [place] 5/22/1932  Date of evaluation: 1/22/2023  Provider:DAVON Bland    CHIEF COMPLAINT       Chief Complaint   Patient presents with    Fall     Pt has small lac to back of head, bleeding controlled, no LOC reported         HISTORY OF PRESENT ILLNESS  (Location/Symptom, Timing/Onset, Context/Setting, Quality, Duration, Modifying Factors, Severity.)   Geovanny Elkins is a 80 y.o. female who presents to the emergency department with complaints of fall she is prone to falls was at nursing home she has dementia daughters confirming she is prone to falls baseline confusion want UA done. She hasnt walked since fall denies pain pelvis can move legs follows commands. Concern for hematoma right scalp possible laceration. Bleeding controlled. HPI    Nursing Notes were reviewed and I agree. REVIEW OF SYSTEMS    (2-9 systems for level 4, 10 or more for level 5)     Review of Systems   Constitutional:  Negative for activity change, appetite change, chills and fever. HENT:  Negative for congestion, postnasal drip, rhinorrhea and sore throat. Eyes:  Negative for photophobia, pain, discharge and visual disturbance. Respiratory:  Negative for apnea, cough and shortness of breath. Cardiovascular:  Negative for chest pain and leg swelling. Gastrointestinal:  Negative for abdominal distention, abdominal pain and nausea. Genitourinary:  Negative for vaginal bleeding. Musculoskeletal:  Negative for arthralgias, back pain, joint swelling, neck pain and neck stiffness. Skin:  Positive for wound. Negative for color change and rash. Neurological:  Negative for dizziness, syncope, facial asymmetry and headaches. Hematological:  Negative for adenopathy. Does not bruise/bleed easily. Psychiatric/Behavioral:  Positive for confusion. Negative for agitation and behavioral problems.        Except as noted above the remainder of the review of systems was reviewed and negative. PAST MEDICAL HISTORY     Past Medical History:   Diagnosis Date    Alzheimer's dementia ([facility] [address]     Anxiety     Hypertension     Repeated falls          SURGICAL HISTORY       Past Surgical History:   Procedure Laterality Date    EYE SURGERY      HIP SURGERY           CURRENT MEDICATIONS       Previous Medications    ACETAMINOPHEN (TYLENOL) 325 MG TABLET    Take 2 tablets by mouth every 8 hours as needed for Pain (For temp greater than 100.5 F (38 C))    CALCIUM CARBONATE 1500 (600 CA) MG TABS TABLET    Take 600 mg by mouth 2 times daily (with meals)    CEFTRIAXONE (ROCEPHIN) 1 G INJECTION    Inject 1,000 mg into the muscle every 24 hours    MELATONIN PO    Take by mouth    MIRTAZAPINE (REMERON) 15 MG TABLET    Take 15 mg by mouth nightly    PROLIA 60 MG/ML SOSY SC INJECTION        SULFAMETHOXAZOLE-TRIMETHOPRIM (BACTRIM DS;SEPTRA DS) 800-160 MG PER TABLET           ALLERGIES     Morphine and Penicillins    FAMILY HISTORY     History reviewed. No pertinent family history. SOCIAL HISTORY       Social History     Socioeconomic History    Marital status:      Spouse name: None    Number of children: None    Years of education: None    Highest education level: None   Tobacco Use    Smoking status: Never    Smokeless tobacco: Never   Substance and Sexual Activity    Alcohol use: No    Drug use: No       SCREENINGS    Quiana Coma Scale  Eye Opening: Spontaneous  Best Verbal Response: Confused  Best Motor Response: Obeys commands  Quiana Coma Scale Score: 14      PHYSICAL EXAM    (up to 7 forlevel 4, 8 or more for level 5)     ED Triage Vitals [01/22/23 2340]   BP Temp Temp src Heart Rate Resp SpO2 Height Weight   (!) 160/78 98.1 °F (36.7 °C) -- 94 16 92 % -- 140 lb (63.5 kg)       Physical Exam  Vitals and nursing note reviewed. Constitutional:       General: She is not in acute distress. Appearance: Normal appearance.  She is well-developed. She is not diaphoretic. HENT:      Head:        Right Ear: Tympanic membrane, ear canal and external ear normal.      Left Ear: Tympanic membrane, ear canal and external ear normal.      Nose: Nose normal.      Mouth/Throat:      Pharynx: No oropharyngeal exudate. Eyes:      General:         Right eye: No discharge. Left eye: No discharge. Pupils: Pupils are equal, round, and reactive to light. Neck:      Thyroid: No thyromegaly. Cardiovascular:      Rate and Rhythm: Normal rate and regular rhythm. Pulses: Normal pulses. Heart sounds: Normal heart sounds. No murmur heard. No friction rub. Pulmonary:      Effort: Pulmonary effort is normal. No respiratory distress. Breath sounds: Normal breath sounds. No stridor. No wheezing. Abdominal:      General: Bowel sounds are normal. There is no distension. Palpations: Abdomen is soft. Tenderness: There is no abdominal tenderness. Musculoskeletal:         General: Normal range of motion. Cervical back: Normal range of motion and neck supple. Skin:     General: Skin is warm and dry. Capillary Refill: Capillary refill takes less than 2 seconds. Findings: No rash. Neurological:      General: No focal deficit present. Mental Status: She is alert and oriented to person, place, and time. Mental status is at baseline. Cranial Nerves: No cranial nerve deficit. Sensory: No sensory deficit. Coordination: Coordination normal.   Psychiatric:         Mood and Affect: Mood normal.         Behavior: Behavior normal.         Thought Content:  Thought content normal.         Judgment: Judgment normal.         DIAGNOSTIC RESULTS     RADIOLOGY:   Non-plain film images such as CT, Ultrasound and MRI are read by the radiologist. Plain radiographic images are visualized and preliminarilyinterpreted by No att. providers found with the below findings:        Interpretation per the Radiologist below, if available at the time of this note:    XR PELVIS (1-2 VIEWS)    (Results Pending)   CT CERVICAL SPINE WO CONTRAST    (Results Pending)   CT HEAD WO CONTRAST    (Results Pending)       LABS:  Labs Reviewed   URINALYSIS WITH REFLEX TO CULTURE - Abnormal; Notable for the following components:       Result Value    Nitrite, Urine POSITIVE (*)     Leukocyte Esterase, Urine LARGE (*)     All other components within normal limits   MICROSCOPIC URINALYSIS - Abnormal; Notable for the following components:    Bacteria, UA 4+ (*)     Crystals, UA NEG (*)     WBC, UA 58 (*)     All other components within normal limits   CULTURE, URINE       All other labs were within normal range or notreturned as of this dictation. RE-ASSESSMENT          EMERGENCY DEPARTMENT COURSE and DIFFERENTIAL DIAGNOSIS/MDM:   Vitals:    Vitals:    01/22/23 2340   BP: (!) 160/78   Pulse: 94   Resp: 16   Temp: 98.1 °F (36.7 °C)   SpO2: 92%   Weight: 140 lb (63.5 kg)         MDM  XR appears normal to me. I am waiting for report from radiology on pelvis and head ct. Abrasion to scalp nothing to close nurse to straight cath as she is prone to UTI. Guarded prognosis. I presume she will go back to nursing home assuming to acute bleed or fx. Abx if indicated. Will pass care over to dr Linder Sample:    Procedures      FINAL IMPRESSION      1. Fall, initial encounter    2. Hematoma of scalp, initial encounter    3. Late onset Alzheimer's dementia with behavioral disturbance ([facility] [address]          DISPOSITION/PLAN   DISPOSITION        PATIENT REFERRED TO:  No follow-up provider specified.     DISCHARGE MEDICATIONS:  New Prescriptions    No medications on file       (Please note that portions of this note were completed with a voice recognition program.  Efforts were made to edit the dictations but occasionallywords are mis-transcribed.)    Peyton Burch, [address] [place] [address]  01/23/23 0030

## 2023-01-23 NOTE — ED NOTES
Called Toya chu, magdy Recinos LPN and updated him on pt status and discharge. Pt being discharged IV access, per Simeon Galo for IV antibiotics. Awaiting EMS for transport.      Fareed Tyson RN  01/23/23 0222

## 2023-01-25 LAB
ORGANISM: ABNORMAL
URINE CULTURE, ROUTINE: ABNORMAL
URINE CULTURE, ROUTINE: ABNORMAL

## 2023-02-07 ENCOUNTER — TELEPHONE (OUTPATIENT)
Dept: FAMILY MEDICINE CLINIC | Age: 88
End: 2023-02-07

## 2023-02-07 NOTE — TELEPHONE ENCOUNTER
Kathleen Buckfield patient UTI - requested the last few urinalysis and urine cultures to be faxed over for Dr. Aquilino Reed to review.

## 2023-02-07 NOTE — TELEPHONE ENCOUNTER
NP has prescribed Levaquin 250 mg. Should get to start that tomorrow. Pt's daughter has been notified per [place]. She will fax over most recent urine culture and blood work as requested.

## 2023-02-07 NOTE — TELEPHONE ENCOUNTER
Patients daughter Ilir Zacarias) called because her urine test results came back from nursing home and she has UTI. The nursing home is saying they can't get her medication for a week. Wants to know if we can get UTI medication.

## 2023-02-10 ENCOUNTER — APPOINTMENT (OUTPATIENT)
Dept: GENERAL RADIOLOGY | Age: 88
End: 2023-02-10
Payer: MEDICARE

## 2023-02-10 ENCOUNTER — HOSPITAL ENCOUNTER (EMERGENCY)
Age: 88
Discharge: HOME | End: 2023-02-11
Payer: MEDICARE

## 2023-02-10 ENCOUNTER — APPOINTMENT (OUTPATIENT)
Dept: CT IMAGING | Age: 88
End: 2023-02-10
Payer: MEDICARE

## 2023-02-10 DIAGNOSIS — S01.81XA FACIAL LACERATION, INITIAL ENCOUNTER: ICD-10-CM

## 2023-02-10 DIAGNOSIS — S09.90XA CLOSED HEAD INJURY, INITIAL ENCOUNTER: Primary | ICD-10-CM

## 2023-02-10 DIAGNOSIS — S62.91XA CLOSED FRACTURE OF RIGHT HAND, INITIAL ENCOUNTER: ICD-10-CM

## 2023-02-10 PROCEDURE — 70450 CT HEAD/BRAIN W/O DYE: CPT

## 2023-02-10 PROCEDURE — 73110 X-RAY EXAM OF WRIST: CPT

## 2023-02-10 PROCEDURE — 12013 RPR F/E/E/N/L/M 2.6-5.0 CM: CPT

## 2023-02-10 PROCEDURE — 73130 X-RAY EXAM OF HAND: CPT

## 2023-02-10 PROCEDURE — 29125 APPL SHORT ARM SPLINT STATIC: CPT

## 2023-02-10 PROCEDURE — 99284 EMERGENCY DEPT VISIT MOD MDM: CPT

## 2023-02-10 PROCEDURE — 72125 CT NECK SPINE W/O DYE: CPT

## 2023-02-10 RX ORDER — TRAZODONE HYDROCHLORIDE 50 MG/1
50 TABLET ORAL NIGHTLY
COMMUNITY

## 2023-02-10 RX ORDER — CLONIDINE HYDROCHLORIDE 0.1 MG/1
0.1 TABLET ORAL PRN
COMMUNITY

## 2023-02-10 RX ORDER — ERGOCALCIFEROL 1.25 MG/1
1250 CAPSULE ORAL DAILY
COMMUNITY

## 2023-02-10 RX ORDER — LEVOFLOXACIN 250 MG/1
250 TABLET ORAL DAILY
COMMUNITY

## 2023-02-11 VITALS
OXYGEN SATURATION: 97 % | SYSTOLIC BLOOD PRESSURE: 138 MMHG | RESPIRATION RATE: 18 BRPM | HEART RATE: 80 BPM | DIASTOLIC BLOOD PRESSURE: 67 MMHG | TEMPERATURE: 98 F

## 2023-02-11 PROCEDURE — 2500000003 HC RX 250 WO HCPCS

## 2023-02-11 RX ORDER — LIDOCAINE HYDROCHLORIDE 10 MG/ML
INJECTION, SOLUTION EPIDURAL; INFILTRATION; INTRACAUDAL; PERINEURAL
Status: COMPLETED
Start: 2023-02-11 | End: 2023-02-11

## 2023-02-11 RX ORDER — LIDOCAINE HYDROCHLORIDE 10 MG/ML
5 INJECTION, SOLUTION INFILTRATION; PERINEURAL ONCE
Status: COMPLETED | OUTPATIENT
Start: 2023-02-11 | End: 2023-02-11

## 2023-02-11 RX ADMIN — LIDOCAINE HYDROCHLORIDE 5 ML: 10 INJECTION, SOLUTION EPIDURAL; INFILTRATION; INTRACAUDAL; PERINEURAL at 00:15

## 2023-02-11 RX ADMIN — LIDOCAINE HYDROCHLORIDE 5 ML: 10 INJECTION, SOLUTION INFILTRATION; PERINEURAL at 00:15

## 2023-02-11 ASSESSMENT — ENCOUNTER SYMPTOMS
NAUSEA: 0
VOMITING: 0
ABDOMINAL PAIN: 0
SHORTNESS OF BREATH: 0
DIARRHEA: 0

## 2023-02-11 ASSESSMENT — PAIN - FUNCTIONAL ASSESSMENT: PAIN_FUNCTIONAL_ASSESSMENT: NONE - DENIES PAIN

## 2023-02-11 NOTE — ED PROVIDER NOTES
[address] [place] [address] EMERGENCY DEPT  eMERGENCY dEPARTMENT eNCOUnter      Pt Name: Sina Lopez  MRN: [id number]  [place] 5/22/1932  Date of evaluation: 2/10/2023  Provider: Hemanth Dubose, [address] [place], [place] [address]       Chief Complaint   Patient presents with    Fall     Pt from [address] [place] [address]. Staff reports witnessed fall tonight. No LOC. Head Injury     Laceration to right side of head. Bleeding controlled. Staff reports no blood thinners. HISTORY OF PRESENT ILLNESS   (Location/Symptom, Timing/Onset,Context/Setting, Quality, Duration, Modifying Factors, Severity)  Note limiting factors. Sina Lopez is a 80 y.o. female with history of Alzheimer's dementia, hypertension, anxiety, multiple falls, and osteoporosis who presents to the emergency department with complaint of a fall with head injury. The patient resides at [address] [place] [address]. The patient had a fall earlier this morning. She then had a second fall this evening. These were both mechanical falls. There was no associated syncope or change in her mentation prior to the fall. The patient's daughters do note that nursing staff told her that she had unhooked herself from her alarm typically on her bed. They deny that she is on any blood thinning medications. They note a laceration to the right side of the head as well as a skin tear to the right arm. They deny any loss of consciousness from her fall today. Patient's daughter states that she was here 2 weeks ago for the same complaint. Patient is actively denying any associated chest pain, shortness of breath, back pain, neck pain, or abdominal pain. He is she does state that she has a headache as well as right wrist and hand pain. Patient is right hand dominant. NursingNotes were reviewed. REVIEW OF SYSTEMS    (2-9 systems for level 4, 10 or more for level 5)     Review of Systems   Constitutional:  Negative for chills and fever. Respiratory:  Negative for shortness of breath. Cardiovascular:  Negative for chest pain. Gastrointestinal:  Negative for abdominal pain, diarrhea, nausea and vomiting. Musculoskeletal:  Positive for arthralgias. Negative for myalgias. Skin:  Positive for wound. Neurological:  Positive for headaches. Negative for dizziness, weakness and numbness. All other systems reviewed and are negative. PAST MEDICALHISTORY     Past Medical History:   Diagnosis Date    Alzheimer's dementia ([facility] [address]     Anxiety     Hypertension     Repeated falls          SURGICAL HISTORY       Past Surgical History:   Procedure Laterality Date    EYE SURGERY      HIP SURGERY           CURRENT MEDICATIONS     Previous Medications    ACETAMINOPHEN (TYLENOL) 325 MG TABLET    Take 2 tablets by mouth every 8 hours as needed for Pain (For temp greater than 100.5 F (38 C))    CALCIUM CARBONATE 1500 (600 CA) MG TABS TABLET    Take 600 mg by mouth 2 times daily (with meals)    CLONIDINE (CATAPRES) 0.1 MG TABLET    Take 0.1 mg by mouth as needed for High Blood Pressure    D-MANNOSE PO    Take by mouth    LEVOFLOXACIN (LEVAQUIN) 250 MG TABLET    Take 250 mg by mouth daily    MIRTAZAPINE (REMERON) 15 MG TABLET    Take 15 mg by mouth nightly    PROLIA 60 MG/ML SOSY SC INJECTION        TRAZODONE (DESYREL) 50 MG TABLET    Take 50 mg by mouth nightly    VITAMIN D (ERGOCALCIFEROL) 1.25 MG (00496 UT) CAPS CAPSULE    Take 1,250 Units by mouth daily Wednesday and Saturday       ALLERGIES     Morphine and Penicillins    FAMILY HISTORY     History reviewed. No pertinent family history. SOCIAL HISTORY       Social History     Socioeconomic History    Marital status:       Spouse name: None    Number of children: None    Years of education: None    Highest education level: None   Tobacco Use    Smoking status: Never    Smokeless tobacco: Never   Substance and Sexual Activity    Alcohol use: No    Drug use: No       SCREENINGS    Quiana Coma Scale  Eye Opening: Spontaneous  Best Verbal Response: Confused  Best Motor Response: Obeys commands  [place] Coma Scale Score: 14        PHYSICAL EXAM    (up to 7 for level 4, 8 or more for level 5)     ED Triage Vitals [02/10/23 2217]   BP Temp Temp Source Heart Rate Resp SpO2 Height Weight   (!) 179/98 98 °F (36.7 °C) Oral 98 16 98 % -- --       Physical Exam  Vitals and nursing note reviewed. Constitutional:       General: She is not in acute distress. Appearance: Normal appearance. She is normal weight. She is ill-appearing. She is not toxic-appearing or diaphoretic. Comments: Frail   HENT:      Head: Normocephalic. Comments: Laceration over the right eyebrow, jagged, Y-shaped, not actively bleeding, will require repair     Mouth/Throat:      Mouth: Mucous membranes are moist.   Eyes:      Extraocular Movements: Extraocular movements intact. Pupils: Pupils are equal, round, and reactive to light. Cardiovascular:      Rate and Rhythm: Normal rate and regular rhythm. Pulses: Normal pulses. Pulmonary:      Effort: Pulmonary effort is normal. No respiratory distress. Chest:      Chest wall: No tenderness. Abdominal:      General: There is no distension. Palpations: Abdomen is soft. Tenderness: There is no abdominal tenderness. Musculoskeletal:      Right shoulder: No swelling, deformity, tenderness or bony tenderness. Normal range of motion. Normal pulse. Right upper arm: No swelling, deformity, tenderness or bony tenderness. Right elbow: No swelling or deformity. Normal range of motion. No tenderness. Right forearm: No swelling, deformity, tenderness or bony tenderness. Right wrist: Laceration (+SKIN TEAR, no deep laceration), tenderness and bony tenderness present. No swelling or deformity. Normal range of motion. Normal pulse. Right hand: Deformity (chronic), tenderness and bony tenderness present. No swelling. Normal range of motion. Normal sensation. Normal capillary refill. Cervical back: Normal range of motion and neck supple. No swelling, deformity, rigidity, tenderness or bony tenderness. No pain with movement. Normal range of motion. Thoracic back: No swelling, deformity, tenderness or bony tenderness. Normal range of motion. Lumbar back: No swelling, deformity, tenderness or bony tenderness. Normal range of motion. Right lower leg: No edema. Left lower leg: No edema. Comments: Bilateral lower extremities and left upper extremity are negative for tenderness, swelling, deformity, or decrease in range of motion. Neurovascularly intact extremities     Lymphadenopathy:      Cervical: No cervical adenopathy. Skin:     General: Skin is warm and dry. Neurological:      Mental Status: She is alert. Mental status is at baseline. Comments: Patient's 2 daughters at bedside note that she is at her base       DIAGNOSTIC RESULTS     RADIOLOGY:  Non-plain film images such as CT, Ultrasound and MRI are read by the radiologist. Plain radiographic images are visualized and preliminarily interpreted bythe emergency physician with the below findings:    CT CERVICAL SPINE WO CONTRAST   Final Result   No evidence of acute fracture or traumatic subluxation of the cervical spine. Extensive multilevel degenerative changes. CT HEAD WO CONTRAST   Final Result   No acute intracranial process evident. Age-related involutional changes and findings compatible with microvascular   ischemia. Right periorbital soft tissue swelling/hemorrhage. XR WRIST RIGHT (MIN 3 VIEWS)   Final Result   No radiographic evidence of acute fracture or dislocation. XR HAND RIGHT (MIN 3 VIEWS)   Final Result   Dorsally displaced and impacted fracture of the distal aspect of the right third   middle phalanx. Osteopenia. LABS:  Labs Reviewed - No data to display    All other labs were within normal range or not returned as of this dictation.     EMERGENCY DEPARTMENT COURSE and DIFFERENTIAL DIAGNOSIS/MDM:   Vitals:    Vitals:    02/10/23 2217   BP: (!) 179/98   Pulse: 98   Resp: 16   Temp: 98 °F (36.7 °C)   TempSrc: Oral   SpO2: 98%       MDM  Patient is a 30-year-old female who presents to the ER with complaint of a mechanical fall. Vital signs did show mild hypertension on arrival which improved in the ER. She did have signs of trauma including laceration on the face as well as pain and the right upper extremity where she tried to catch herself. Plain films of the right upper extremity were obtained and did show signs of fracture in the third distal middle phalanx of the right hand. It is dorsally displaced and impacted. This was splinted. Due to the patient's dementia, we did go ahead and put her in a volar splint to try to decrease her movement and her taking off the splint. She did also have head trauma without loss of consciousness. CT of the head and neck were unremarkable for signs of acute intracranial process or vertebral fracture or malalignment. She tolerated repair of her laceration well. It was thoroughly cleaned and flushed prior to closure. Skin tear on the right arm was also cleaned and dressed. I have gone over wound management outpatient as well as follow-up with orthopedics. Patient's daughters at bedside have both verbalized understanding. Patient is agreeable to plan. I discussed follow-up for frequent falls as well return precautions and they verbalized understanding. All questions were answered.     PROCEDURES:  Unless otherwise noted below, none     Lac Repair    Date/Time: 2/11/2023 12:53 AM  Performed by: DAVON Sharma  Authorized by: DAVON Sharma     Consent:     Consent obtained:  Verbal    Consent given by:  Patient (Daughter is at bedside)    Risks, benefits, and alternatives were discussed: yes    Universal protocol:     Patient identity confirmed:  Verbally with patient and arm band  Anesthesia:     Anesthesia method: Local infiltration    Local anesthetic:  Lidocaine 1% w/o epi  Laceration details:     Location:  Face    Face location:  R eyebrow    Length (cm):  3  Pre-procedure details:     Preparation:  Patient was prepped and draped in usual sterile fashion  Exploration:     Hemostasis achieved with:  Direct pressure    Wound extent: no foreign bodies/material noted, no nerve damage noted, no tendon damage noted and no underlying fracture noted    Treatment:     Area cleansed with:  Chlorhexidine    Amount of cleaning:  Standard    Irrigation solution:  Sterile saline  Skin repair:     Repair method:  Sutures    Suture size:  6-0    Suture material:  Nylon    Suture technique:  Simple interrupted    Number of sutures:  3  Approximation:     Approximation:  Close  Post-procedure details:     Dressing: Dressed by nurse. Procedure completion:  Tolerated well, no immediate complications  Splint Application    Date/Time: 2/11/2023 12:54 AM  Performed by: DAVON Jorge  Authorized by: DAVON Jorge     Consent:     Consent obtained:  Verbal    Consent given by:  Patient (Daughter at bedside)    Risks, benefits, and alternatives were discussed: yes    Universal protocol:     Patient identity confirmed:  Verbally with patient  Pre-procedure details:     Distal neurologic exam:  Normal    Distal perfusion: distal pulses strong and brisk capillary refill    Procedure details:     Location:  Hand    Hand location:  R hand    Splint type:  Volar short arm    Supplies:  Cotton padding, elastic bandage and prefabricated splint  Post-procedure details:     Distal neurologic exam:  Normal    Distal perfusion: distal pulses strong and brisk capillary refill      Procedure completion:  Tolerated well, no immediate complications    Post-procedure imaging: not applicable      FINAL IMPRESSION      1. Closed head injury, initial encounter    2. Facial laceration, initial encounter    3.  Closed fracture of right hand, initial encounter          DISPOSITION/PLAN   DISPOSITION Decision To Discharge 02/11/2023 12:21:21 AM      PATIENT REFERRED TO:  [facility] [address]  [phone]    In 2 days      (Please note that portions of this note were completed with a voice recognition program.  Efforts were made to edit thedictations but occasionally words are mis-transcribed.)    Liban Wang (electronically signed)     Liban Wang  02/11/23 6033

## 2023-02-11 NOTE — ED NOTES
Report called to Jonathan Goss RN. Pt to go to [facility] via [facility].       Elissa Woo RN  02/11/23 9225

## 2023-02-11 NOTE — ED NOTES
Ana Kwon from [facility] called at this time and made aware pt family is requesting pt be given Tylenol when she arrives back. [facility] present for transport back to [facility]. Report given to [facility], EMT. PT left via stretcher without incident at this time in a good disposition.       Farrah Tabares RN  02/11/23 6361

## 2023-02-11 NOTE — ED NOTES
Bacitracin ointment and large bandaid applied to 3 RT eyebrow sutures.       Antonio Mendez RN  02/11/23 5415

## 2023-02-11 NOTE — DISCHARGE INSTRUCTIONS
Follow up with your primary care provider in the next 3-5 days to ensure improvement. Return to the ER for new or worsening symptoms. I also recommend follow up with orthopedics for hand fracture.

## 2023-02-11 NOTE — ED NOTES
DAVON Darling at b/s at this time. Skin tear to RT wrist cleansed with chlorhexidine solution and dressed with tegaderm and gauze 4x4. Edges of wound well approximated, minimal oozing noted from site. PA applied orthoglass splint applied to RT wrist, pt tolerated well.  Y-shaped laceration above RT eyebrow cleansed with chlorhexidine solution, bleeding controlled suturing started by PA at [address], [address] [facility]  02/11/23 0017

## 2023-03-29 NOTE — PROGRESS NOTES
Subjective    Ms. Chin is 89 y.o. female    Chief Complaint: recurrent uti    History of Present Illness    89-year-old female new patient referred for recurrent urinary tract infections.  Patient has had at least 3 culture confirmed urinary tract infections over the last 6 months including Pseudomonas, Proteus and Klebsiella bacteria's showing resistance to ampicillin and nitrofurantoin.  Patient had previously been on 100 mg nitrofurantoin daily prophylaxis however patient's daughter reports over the last month stopping all antibiotic usage as patient's daughters feel patient had been on too much antibiotic therapy and that was possibly contributing to patient's worsening bacteria status.  Patient has since been placed on d-mannose, a probiotic and cranberry tablet.  Along with a biotic recommended through daughters acupuncture specialist.  Patient with a history of vascular dementia is accompanied with her 2 daughters who state when infections are present symptoms consist of agitation, inability to form words, and increased falls as patient tries to get out of her wheelchair.  Daughters are concerned that the infections are caused from the new usage of incontinence briefs since being placed in the skilled nursing facility approximately a year to year and a half ago.    The following portions of the patient's history were reviewed and updated as appropriate: allergies, current medications, past family history, past medical history, past social history, past surgical history and problem list.    Review of Systems   Constitutional: Negative for chills, fatigue and fever.   Gastrointestinal: Negative for abdominal pain, anal bleeding, blood in stool, nausea and vomiting.   Genitourinary: Negative for decreased urine volume, difficulty urinating, dysuria, flank pain, frequency, hematuria, pelvic pain, urgency and vaginal pain.   Psychiatric/Behavioral: Positive for confusion.        Hx vascular dementia          Current Outpatient Medications:   •  acetaminophen (TYLENOL) 325 MG tablet, Take 2 tablets by mouth Every 6 (Six) Hours As Needed for Mild Pain., Disp: , Rfl:   •  calcium carbonate (OS-LINDA) 600 MG tablet, Take 1 tablet by mouth 2 (Two) Times a Day With Meals., Disp: , Rfl:   •  cloNIDine (CATAPRES) 0.1 MG tablet, Take 1 tablet by mouth 2 (Two) Times a Day. Q6PRN, Disp: , Rfl:   •  D-MANNOSE PO, Take  by mouth., Disp: , Rfl:   •  denosumab (Prolia) 60 MG/ML solution prefilled syringe syringe, Inject  under the skin into the appropriate area as directed 1 (One) Time., Disp: , Rfl:   •  magnesium hydroxide (MILK OF MAGNESIA) 400 MG/5ML suspension, Take  by mouth Daily As Needed for Constipation., Disp: , Rfl:   •  polyvinyl alcohol (LIQUIFILM) 1.4 % ophthalmic solution, 1 drop As Needed for Dry Eyes., Disp: , Rfl:   •  Quercetin 500 MG capsule, Take  by mouth. 4 CAPS DAILY, Disp: , Rfl:   •  vitamin D (ERGOCALCIFEROL) 1.25 MG (25131 UT) capsule capsule, Take 1,250 Units by mouth 2 (Two) Times a Week. Wednesday and saturday, Disp: , Rfl:   •  ciprofloxacin (CIPRO) 500 MG tablet, Take 500 mg by mouth 2 (Two) Times a Day. (Patient not taking: Reported on 4/3/2023), Disp: , Rfl:   •  traZODone (DESYREL) 50 MG tablet, Take 50 mg by mouth Every Night. (Patient not taking: Reported on 4/3/2023), Disp: , Rfl:     Past Medical History:   Diagnosis Date   • Alzheimer's dementia    • Anxiety    • Arthritis    • Closed displaced fracture of third cervical vertebra    • Dementia    • Dysphagia    • Hypertension    • UTI (urinary tract infection)    • Weakness        Past Surgical History:   Procedure Laterality Date   • OTHER SURGICAL HISTORY      hole in stomach repaired 2009       Social History     Socioeconomic History   • Marital status: Single   Tobacco Use   • Smoking status: Never   • Smokeless tobacco: Never   Substance and Sexual Activity   • Alcohol use: Not Currently       History reviewed. No pertinent family  "history.    Objective    Temp 98.3 °F (36.8 °C)   Ht 167.6 cm (66\")   Wt 55.8 kg (123 lb)   LMP  (LMP Unknown)   BMI 19.85 kg/m²     Physical Exam  Nursing note reviewed.   Constitutional:       General: She is not in acute distress.     Appearance: Normal appearance. She is not ill-appearing.   HENT:      Nose: No congestion.   Abdominal:      Tenderness: There is no right CVA tenderness or left CVA tenderness.      Hernia: No hernia is present.   Musculoskeletal:      Comments: Wheelchair usage   Skin:     General: Skin is warm and dry.   Neurological:      Mental Status: She is alert. Mental status is at baseline. She is confused.   Psychiatric:         Attention and Perception: She is inattentive.         Mood and Affect: Mood normal.         Behavior: Behavior normal.             Results for orders placed or performed in visit on 04/03/23   POC Urinalysis Dipstick, Multipro    Specimen: Urine   Result Value Ref Range    Color Yellow Yellow, Straw, Dark Yellow, Deya    Clarity, UA Clear Clear    Glucose, UA Negative Negative mg/dL    Bilirubin Negative Negative    Ketones, UA Negative Negative    Specific Gravity  1.030 1.005 - 1.030    Blood, UA Moderate (A) Negative    pH, Urine 5.5 5.0 - 8.0    Protein,  mg/dL (A) Negative mg/dL    Urobilinogen, UA 0.2 E.U./dL Normal, 0.2 E.U./dL    Nitrite, UA Negative Negative    Leukocytes Small (1+) (A) Negative     Assessment and Plan    Diagnoses and all orders for this visit:    1. Frequent UTI (Primary)  -     POC Urinalysis Dipstick, Multipro      89-year-old female new patient referred for recurrent urinary tract infections.     Most recent urine culture from February of this year positive for Pseudomonas was treated with ciprofloxacin    Previously treated with nitrofurantoin prophylaxis-no longer taking as daughter stopped all antibiotic therapy and is trying more natural supplements-    Is now currently on d-mannose, cranberry, probiotic and a biotic " through acupuncture specialist    Reports patient is currently asymptomatic    Urinalysis today appears positive for moderate blood and small leukocytes.  I physically evaluated patient's urine under the microscope and saw approximately 10-15 red blood cells per high-powered field along with 10-20 white blood cells, along with 1-2 bacterial rods.    From reviewing previous documentation it appears patient is likely chronically colonized with bacteria given that patient continues to have bacteria in urine despite ongoing antibiotic usage as patient reports that she has even received IV infusions and still showed positive for bacteria    We do not recommend treating asymptomatic bacteriuria and the family appears to be in understanding of this and does not want patient on any further antibiotic at present.  Recommend patient continuing daily cranberry, d-mannose, and probiotic    We did have the discussion about possible vaginal estrogen cream usage however given patient's status and nursing facility and inability to cognitively remember to apply the cream do not feel that this is a good option at present    At this point feel we can follow patient on an as-needed basis as the nursing facility is capable of checking and treating patient for a urinary tract infection if need be

## 2023-04-03 ENCOUNTER — OFFICE VISIT (OUTPATIENT)
Dept: UROLOGY | Facility: CLINIC | Age: 88
End: 2023-04-03
Payer: MEDICARE

## 2023-04-03 VITALS — TEMPERATURE: 98.3 F | HEIGHT: 66 IN | BODY MASS INDEX: 19.77 KG/M2 | WEIGHT: 123 LBS

## 2023-04-03 DIAGNOSIS — N39.0 FREQUENT UTI: Primary | ICD-10-CM

## 2023-04-03 LAB
BILIRUB BLD-MCNC: NEGATIVE MG/DL
CLARITY, POC: CLEAR
COLOR UR: YELLOW
GLUCOSE UR STRIP-MCNC: NEGATIVE MG/DL
KETONES UR QL: NEGATIVE
LEUKOCYTE EST, POC: ABNORMAL
NITRITE UR-MCNC: NEGATIVE MG/ML
PH UR: 5.5 [PH] (ref 5–8)
PROT UR STRIP-MCNC: ABNORMAL MG/DL
RBC # UR STRIP: ABNORMAL /UL
SP GR UR: 1.03 (ref 1–1.03)
UROBILINOGEN UR QL: ABNORMAL

## 2023-04-03 PROCEDURE — 1159F MED LIST DOCD IN RCRD: CPT

## 2023-04-03 PROCEDURE — 1160F RVW MEDS BY RX/DR IN RCRD: CPT

## 2023-04-03 PROCEDURE — 99204 OFFICE O/P NEW MOD 45 MIN: CPT

## 2023-04-03 PROCEDURE — 81001 URINALYSIS AUTO W/SCOPE: CPT

## 2023-04-03 RX ORDER — CLONIDINE HYDROCHLORIDE 0.1 MG/1
0.1 TABLET ORAL 2 TIMES DAILY
COMMUNITY

## 2023-04-03 RX ORDER — MILK THISTLE 150 MG
CAPSULE ORAL
COMMUNITY

## 2023-04-03 RX ORDER — POLYVINYL ALCOHOL 14 MG/ML
1 SOLUTION/ DROPS OPHTHALMIC AS NEEDED
COMMUNITY

## 2023-04-20 ENCOUNTER — TELEPHONE (OUTPATIENT)
Dept: CT IMAGING | Facility: HOSPITAL | Age: 88
End: 2023-04-20
Payer: MEDICARE

## 2023-05-01 PROBLEM — J90 PLEURAL EFFUSION: Status: ACTIVE | Noted: 2023-01-01

## 2023-05-01 PROBLEM — J96.01 ACUTE RESPIRATORY FAILURE WITH HYPOXIA (HCC): Status: ACTIVE | Noted: 2023-01-01

## 2023-05-01 PROBLEM — A41.9 SEPSIS (HCC): Status: ACTIVE | Noted: 2023-01-01

## 2023-05-01 PROBLEM — D72.829 LEUKOCYTOSIS: Status: ACTIVE | Noted: 2023-01-01

## 2023-05-01 PROBLEM — F02.80 LATE ONSET ALZHEIMER'S DEMENTIA WITHOUT BEHAVIORAL DISTURBANCE (HCC): Status: ACTIVE | Noted: 2021-11-05

## 2023-05-01 NOTE — H&P
SPECGRAV 1.010 01/23/2023 12:03 AM    GLUCOSEU Negative 01/23/2023 12:03 AM     A1C: No results for input(s): LABA1C in the last 72 hours. ABG:  Recent Labs     05/01/23  1255   PHART 7.490*   QDI2CWB 37.0   PO2ART 69.0*   EPQ9JJN 28.2*   BEART 4.7*   HGBAE 11.1*   U1DJQBNS 92.2   CARBOXHGBART 2.2       CT HEAD WO CONTRAST    Result Date: 5/1/2023  Exam: CT head without contrast History: Altered mental status Prior Exam: None. Technique: Non contrast CT of the head was performed per the ordering facility protocol. Contrast: None Findings: The scalp is within normal limits. There is no acute intracranial hemorrhage. No CT evidence of acute major vascular territory infarct. Moderate multilevel degenerative changes. There is no mass-effect, midline shift, or herniation. No hydrocephalus. The included orbital structures are within normal limits. The included paranasal sinuses are grossly clear. The mastoids are unremarkable. No acute intracranial abnormality. XR CHEST PORTABLE    Result Date: 5/1/2023  CHEST X-RAY, 1 view INDICATION: SOB COMPARISON: None. TECHNIQUE: Single frontal view of the chest. FINDINGS: Moderate right pleural effusion. Right basilar opacities. No pneumothorax. Cardiomediastinal silhouette is enlarged in size. No significant osseous findings. Moderate right pleural effusion with adjacent atelectasis versus pneumonia. CTA PULMONARY W CONTRAST    Result Date: 5/1/2023  Examination: Chest CT angiogram with IV Contrast Clinical history: Hypoxia, SOA Comparison: None Technique: Helically acquired CT angiographic images of the thorax were obtained following IV contrast administration. Images were reconstructed in the axial, sagittal, and coronal planes. FINDINGS: Thoracic inlet: Unremarkable. Pulmonary arteries: No filling defects are identified within the pulmonary arteries to suggest acute pulmonary embolism.  Great vessels: Atherosclerotic plaque is seen within the thoracic aorta and proximal

## 2023-05-01 NOTE — ED NOTES
Pt more awake and alert talking to staff. Confused, family reports confusion at baseline.         Emanuel Thompson RN  05/01/23 0618

## 2023-05-01 NOTE — ED PROVIDER NOTES
[facility] EMERGENCY DEPT  EMERGENCY DEPARTMENT ENCOUNTER      Pt Name: [place]  MRN: [id number]  Brandon 5/22/1932  Date of evaluation: 5/1/2023  Provider: Bernarda Noonan MD    CHIEF COMPLAINT       Chief Complaint   Patient presents with    Fatigue     Pt here with lethargy for at least 4 days. Pt from [facility]. Ems report initial spo2 in the 70's pt placed on o2 at 4lpm nc with good response          HISTORY OF PRESENT ILLNESS   (Location/Symptom, Timing/Onset,Context/Setting, Quality, Duration, Modifying Factors, Severity)  Note limiting factors. [place] is a 80 y.o. female who presents to the emergency department for evaluation after having decline in mental status and functional status over the last several days. History provided by daughter, ems, nursing facility. Has had cough for 2 weeks. Over the last 4 days has been more confused and lethargic. No fevers recently. Today, nursing staff noticed increased work of breathing. Was hypoxic in the 76s on RA when EMS arrived, 4L NC applied. Has h/o recurrent UTIs and had been on levaquin in the past, but no abx in the last 2 months or so per daughter. HPI    NursingNotes were reviewed. REVIEW OF SYSTEMS    (2-9 systems for level 4, 10 or more for level 5)     Review of Systems    A complete review of systems was performed and is negative except as noted above in the HPI.        PAST MEDICAL HISTORY     Past Medical History:   Diagnosis Date    Alzheimer's dementia ([facility] [address]     Anxiety     Hypertension     Repeated falls          SURGICAL HISTORY       Past Surgical History:   Procedure Laterality Date    EYE SURGERY      HIP SURGERY           CURRENT MEDICATIONS       Previous Medications    ACETAMINOPHEN (TYLENOL) 325 MG TABLET    Take 2 tablets by mouth every 8 hours as needed for Pain (For temp greater than 100.5 F (38 C))    CALCIUM CARBONATE 1500 (600 CA) MG TABS TABLET    Take 600 mg by mouth 2 times daily (with

## 2023-05-01 NOTE — ED NOTES
Dr Pippa Tapia and Tee Barbour APRN at bedside to reevaluate pt. Dr Pippa Tapia reports plan for Tee Barbour to [facility] pt later to [facility].   Currently pt is planned to go to PCU     Francisco Lloyd RN  05/01/23 4534

## 2023-05-02 PROBLEM — R41.82 ALTERED MENTAL STATUS: Status: ACTIVE | Noted: 2023-01-01

## 2023-05-02 PROBLEM — Z51.5 PALLIATIVE CARE PATIENT: Status: ACTIVE | Noted: 2023-01-01

## 2023-05-02 NOTE — CONSULTS
MEDICAL ONCOLOGY CONSULTATION    Pt Name: [place]  MRN: [id number]  YOB: 1932  Date of evaluation: 5/2/2023    REASON FOR CONSULTATION:  Neutrophilic leukocytosis  REQUESTING PHYSICIAN:Hospitalist, Dr Marc Segura    History Obtained From:  Darian Cantu record     HISTORY OF PRESENT ILLNESS:  [place] was first seen by me on 60/05/80 for neuthrophilic leukocytosis during inpatient hospitalization at [address]    05/01/23- CT head w/o remarkable for: No acute intracranial abnormality. 05/01/23- chest x-ray: Moderate right pleural effusion with adjacent atelectasis versus pneumonia. 05/01/23-CTA with contrast:. No evidence of acute pulmonary embolism. Moderate right pleural effusion with bibasilar atelectasis or infiltrates. Laboratorial workup remarkable for WBC 58.9, ANC 50.7, Hb 10. 3?  and PLTS 401. CMP revealed Albumin 2.3, normal LFTs and normal creatinine           Past Medical History:    Past Medical History:   Diagnosis Date    Alzheimer's dementia ([facility] [address]     Anxiety     Hypertension     Repeated falls        Past Surgical History:    Past Surgical History:   Procedure Laterality Date    EYE SURGERY      HIP SURGERY         Social History:    Marital status:  Smoking status:  ETOH status:  Resides:    Family History:   No family history on file.     Current Hospital Medications:    Current Facility-Administered Medications   Medication Dose Route Frequency Provider Last Rate Last Admin    vancomycin (VANCOCIN) 1000 mg in sodium chloride 0.9% 250 mL IVPB  1,000 mg IntraVENous Q24H Lauryn Jones MD        vancomycin [facility]) intermittent dosing (placeholder)   Other RX Placeholder Lauryn Jones MD        sodium chloride flush 0.9 % injection 5-40 mL  5-40 mL IntraVENous 2 times per day  Risk APRN - CNP        sodium chloride flush 0.9 % injection 5-40 mL  5-40 mL IntraVENous PRN Linda Ken APRN - CNP        0.9 % sodium chloride infusion   IntraVENous PRN
review, discussion with medical team, review of current and home medications, review of care everywhere, education on hospice services, and placement of orders/preparation of this note: 79 minutes    Electronically signed by ALISIA Killian CNP on 5/2/2023 at 7:57 AM    (Please note that portions of this note were completed with a voice recognition program.  Efforts were made to edit the dictations but occasionally words are mis-transcribed.)

## 2023-05-02 NOTE — ACP (ADVANCE CARE PLANNING)
Advance Care Planning     Advance Care Planning (ACP) Physician/NP/PA (Provider) Conversation      Date of ACP Conversation: 5/2/2023    Conversation Conducted with:   Pts daughter at bedside and daughter/POA, Janusz Vines, over telephone    Health Care Decision Maker:  Current Designated Health Care Decision Maker:     Primary Decision Maker: Abelardo Millard - Child - [phone]    Care Preferences:  Ventilation: \"If you were in your present state of health and suddenly became very ill and were unable to breathe on your own, what would your preference be about the use of a ventilator (breathing machine) if it was available to you? \"    NO    Resuscitation:  \"CPR works best to restart the heart when there is a sudden event, like a heart attack, in someone who is otherwise healthy. Unfortunately, CPR does not typically restart the heart for people who have serious health conditions or who are very sick. \"    \"In the event your heart stopped as a result of an underlying serious health condition, would you want attempts to be made to restart your heart (answer \"yes\" for attempt to resuscitate) or would you prefer a natural death (answer \"no\" for do not attempt to resuscitate)? \"   NO    Conversation Outcomes / Follow-Up Plan:   Reviewed patient's ACP/POA documents on file. Patient's daughter, Janusz Vines, remains her POA but patient's other children are very involved in her care and support Janusz Vines and decision making. Family confirms that she is to be a DNR in the event of cardiac or respiratory arrest but they wish to continue all current medical management in hopes for improvement at this time.     Length of Voluntary ACP Conversation in minutes:  14 minutes during consult visit  Erica Zamarripa, APRN - CNP

## 2023-05-02 NOTE — PLAN OF CARE
Vital signs have remained stable, patient to be moved to [place] [address]. Patient reassessed, is asleep resting comfortably, unlabored respirations and in no acute distress.  Continuous pulse oximetry 96%, 98 bpm.

## 2023-05-02 NOTE — CARE COORDINATION
Kaela spoke to Trinidad au from [facility] ph: [phone]  ph: [phone]  C: [phone]  Referral fax:  [phone]  Pt Is a bed hold and can return once medically stable.    Kaela has set SNF pharmacy to [facility]  Electronically signed by RONAN Abraham on 5/2/2023 at 8:47 AM

## 2023-05-02 NOTE — PROCEDURES
[facility] Vascular Access Team:  Midline Insertion Procedure Note      Patient: Ike Galvan  YOB: 1932   MRN: [id number]  Room: [address] [place] [address]     Attending Physician - Jessica Kwon MD  Ordering Physician - Fay Levy MD    Diagnosis:   Pleural effusion on right [J90]  Sepsis secondary to UTI ([address] [A41.9, N39.0]  Acute respiratory failure with hypoxia ([address] [J96.01]  Altered mental status, unspecified altered mental status type [R41.82]  Leukocytosis, unspecified type [D72.829]       Procedure(s): Insertion of a 4.5 [place] Single Lumen Power Midline    Indication(s):   Poor Venous Access    Date of Procedure: 5/2/2023   Start Time: 9940  End Time: 1400    Performed by: Jacquelyne Ormond, RN    Anesthesia: Local Injection <=5 mL 1% Lidocaine without Epinephrine    Estimated Blood Loss (mL): Minimal    Complications: None    Midline Single Lumen 17/31/14 Left Basilic (Active)   Criteria for Appropriate Use Limited/no vessel suitable for conventional peripheral access 05/02/23 1400   Site Assessment Clean, dry & intact 05/02/23 1400   Phlebitis Assessment No symptoms 05/02/23 1400   Infiltration Assessment 0 05/02/23 1400   Extremity Circumference (cm) 36 cm 05/02/23 1400   External Catheter Length (cm) 0 cm 05/02/23 1400   Lumen Color/Status Yellow; Flushed;Normal saline locked; Blood return noted; Alcohol cap applied 05/02/23 1400   Alcohol Cap Used Yes 05/02/23 1400   Date of Last Dressing Change 05/02/23 05/02/23 1400   Dressing Type Transparent w/CHG gel;Securing device 05/02/23 1400   Dressing Status New dressing applied;Clean, dry & intact 05/02/23 1400   Dressing Intervention New 05/02/23 1400         Findings:   1. Successful insertion of Midline. 2. Midline is ready to be used. Please change tubing prior to using the new Midline. Make sure to label, date and use alcohol caps on new tubing and alcohol caps on unused ports. 3. Labs may be drawn from Midline.  Please make patient an unit

## 2023-05-03 NOTE — DISCHARGE SUMMARY
[place], [place], [address]    DEPARTMENT OF HOSPITALIST MEDICINE      DISCHARGE SUMMARY:      PATIENT NAME:  [place]  :  1932  MRN:  [id number]    Admission Date:   2023 12:14 PM Attending: Amanda Kruse MD   Discharge Date:   5/3/2023              PCP: Cesia Amezcua  Length of Stay: 2 days     Chief Complaint on Admission:   Chief Complaint   Patient presents with    Fatigue     Pt here with lethargy for at least 4 days. Pt from [facility]. Ems report initial spo2 in the 70's pt placed on o2 at 4lpm nc with good response        Consultants:     IP CONSULT TO PHARMACY  IP CONSULT TO ONCOLOGY  IP CONSULT TO PALLIATIVE CARE  IP CONSULT TO [address] [place] [address]  COURSE  AND  TREATMENT:  The patient is a 80 y.o. female who presented to [facility] ER with PMH dementia, HTN, UTI complaining of shortness of breath. Patient is unable to provide HPI obtained from daughter POA who is present at bedside along with ER documentation. Daughter states that yesterday patient was very fatigued and was more confused. Today, she was notified by nursing staff that patient was dyspneic at rest and lethargic. EMS was notified. Patient has had ongoing productive cough for the past few weeks and staff have noted patient's urine to be discolored and has strong odor. Denied recent fever. Upon EMS arrival oxygen saturation in the 70's on room air. Per daughter patient has not been on antibiotics in last two months. Work up in [facility] CTA pulmonary moderate right pleural effusion with bibasilar atelectasis or infiltrates, BNP 3,352, WBC 71.1, Neutrophils absolute 64.7, lactic acid 2.1 pro-calcitonin 1.25, urinalysis large leukocyte, WBC TNTC, and blood cultures pending. ABG pH 7.49 pCO2 37 pO2 69 HCO3 28.2. O2 4L NC applied while in ER. EKG ST no acute ischemic change. Confirmed CODE STATUS DNR.       Patient is to be admitted to hospitalist service with consultation to

## 2023-05-03 NOTE — PROGRESS NOTES
[address] [facility] Pharmacokinetic Monitoring Service - Vancomycin     Bennie Cantor is a 80 y.o. female starting on vancomycin therapy for sepsis. Pharmacy consulted by Dr Rizwana Barragan for monitoring and adjustment. Target Concentration:  trough 15-20    Additional Antimicrobials: zosyn    Pertinent Laboratory Values: Wt Readings from Last 1 Encounters:   05/01/23 140 lb (63.5 kg)     Temp Readings from Last 1 Encounters:   05/01/23 98.4 °F (36.9 °C) (Oral)     Estimated Creatinine Clearance: 35 mL/min (A) (based on SCr of 1 mg/dL (H)). Recent Labs     05/01/23  1257   CREATININE 1.0*   WBC 71.1*       Pertinent Cultures:  Culture Date Source Results   5/1/23 Blood x2 sent   5/1/23 Urine  sent   MRSA Nasal Swab: N/A. Non-respiratory infection.     Plan:  Concentration-guided dosing due to renal impairment/insufficiency  Start vancomycin pulse dosing, give 1500mg load today  Renal labs as indicated   Vancomycin concentration ordered for 5/2 with AM labs   Pharmacy will continue to monitor patient and adjust therapy as indicated    Thank you for the consult,  Manual ALBERTO Rausch [facility]  5/1/2023 1:55 PM
[address] [facility] Pharmacokinetic Monitoring Service - Vancomycin    Consulting Provider: Maricruz Bauman MD   Indication: Sepsis of Unknown Etiology  Target Concentration:  Trough 15-20 mg/L, switch to -600 once renal function stable  Day of Therapy: 2  Additional Antimicrobials: Meropenem    Pertinent Laboratory Values: Wt Readings from Last 1 Encounters:   05/01/23 140 lb (63.5 kg)     Temp Readings from Last 1 Encounters:   05/01/23 97.3 °F (36.3 °C)     Estimated Creatinine Clearance: 44 mL/min (based on SCr of 0.8 mg/dL). Recent Labs     05/01/23  1257 05/02/23  0357 05/02/23  0358   CREATININE 1.0*  --  0.8   WBC 71.1* 58.9*  --      Procalcitonin: 05/01/23 = 1.25    Pertinent Cultures:  Culture Date Source Results   05/01/23 Blood x 2 1 of 2 positive for Gram positive cocci in chains and/or pairs resembling Streptococcus    05/01/23 Urine Sent   05/01/23 Blood molecular ID No result   MRSA Nasal Swab: N/A. Non-respiratory infection. Recent vancomycin administrations                     vancomycin (VANCOCIN) 1500 mg in sodium chloride 0.9% 500 mL IVPB (mg) 1,500 mg New Bag 05/01/23 1529                    Assessment:  Date/Time Current Dose Concentration Timing of Concentration (h)   05/02/23 Pulse dose 12.0 12 h 29 m   Note: Serum concentrations collected for AUC dosing may appear elevated if collected in close proximity to the dose administered, this is not necessarily an indication of toxicity    Loading dose: 1500 mg at 15:29 05/01/2023. Regimen: 1000 mg IV every 24 hours.   Start time: 04:59 on 05/02/2023  Exposure target: AUC24 (range)400-600 mg/L.hr   AUC24,ss: 464 mg/L.hr  Probability of AUC24 > 400: 75 %  Ctrough,ss: 14.2 mg/L  Probability of Ctrough,ss > 20: 13 %  Probability of nephrotoxicity (Lodise RONNIE 2009): 9 %    Plan:  Current dosing regimen is therapeutic  Initiated scheduled dosing of Vancomycin 1 gm IV every 24 hours  Repeat vancomycin concentration
Daily Progress Note    Date:2023  Patient: Edward Dey  : 1932  JCL:[id number]  CODE:DNR No additional code details  [address] Date: 2023 12:14 PM   LOS: 1 day     Chief Complaint   Patient presents with    Fatigue     Pt here with lethargy for at least 4 days. Pt from [facility]. Ems report initial spo2 in the 70's pt placed on o2 at 4lpm nc with good response          Subjective: NAEON. O2 requirements down to 2L. Less agitated during the day today. Daughters are at bedside. Low UOP. Bladder scan only showed 200 cc. Hospital Summary:  The patient is a 80 y.o. female who presented to [facility] ER with PMH dementia, HTN, UTI complaining of shortness of breath. Patient is unable to provide HPI obtained from daughter POA who is present at bedside along with ER documentation. Daughter states that yesterday patient was very fatigued and was more confused. Today, she was notified by nursing staff that patient was dyspneic at rest and lethargic. EMS was notified. Patient has had ongoing productive cough for the past few weeks and staff have noted patient's urine to be discolored and has strong odor. Denied recent fever. Upon EMS arrival oxygen saturation in the 70's on room air. Per daughter patient has not been on antibiotics in last two months. Work up in [facility] 19 CTA pulmonary moderate right pleural effusion with bibasilar atelectasis or infiltrates, BNP 3,352, WBC 71.1, Neutrophils absolute 64.7, lactic acid 2.1 pro-calcitonin 1.25, urinalysis large leukocyte, WBC TNTC, and blood cultures pending. ABG pH 7.49 pCO2 37 pO2 69 HCO3 28.2. O2 4L NC applied while in ER. EKG ST no acute ischemic change. Confirmed CODE STATUS DNR. Patient is to be admitted to hospitalist service with consultation to oncology. Started on empiric Vancomycin and Meropenem for pneumonia and UTI. Review of Systems   Reason unable to perform ROS: AMS.
Palliative Care Progress Note  5/3/2023 8:46 AM    Patient:  Haris Diaz  YOB: 1932  Primary Care Physician: Cesia Amezcua  Advance Directive: DNR  Admit Date: 5/1/2023       Hospital Day: 2  Portions of this note have been copied forward, however, changed to reflect the most current clinical status of this patient. CHIEF COMPLAINT/REASON FOR CONSULTATION Goals of care, family support, Code status, symptom management     SUBJECTIVE: Ms. Edmundo Thomson appears uncomfortable today with respiratory distress. Condition has continued to decline overnight. Hospitalist at bedside this morning. HPI: The patient is a 80 y.o. female with PMH HTN, alzheimer's, anxiety, who presented to [facility] ED 5/1/2023 from SNF with SOB. Pts family and nursing home staff report a decline in mental and functional status over the last several days. Patient has had ongoing productive cough for the past few weeks and staff have noted patient's urine to be discolored and has strong odor. On day of admission she was noted to have increased dyspnea at rest and lethargy. Upon EMS arrival oxygen saturation in the 70's on room air. Work up in [place] [address] revealed, BNP 3,352, WBC 71.1, Neutrophils absolute 64.7, lactic acid 2.1 pro-calcitonin 1.25. UA with large leukocyte, WBC TNTC, and blood cultures pending. ABG pH 7.49 pCO2 37 pO2 69 HCO3 28.2. O2 4L NC applied while in ER. EKG ST no acute ischemic change. CTA pulmonary moderate right pleural effusion with bibasilar atelectasis or infiltrates. CT head with no acute intracranial abnormality. Patient was admitted under hospitalist services with oncology evaluation. Daughter has expressed she wishes to hold off on any invasive procedures at this time. Palliative care is consulted for goals of care, code status discussion, and symptom management. Review of Systems:   14 point review of systems is negative except as specifically addressed above.     Objective:
Patient name: Miguelina De La Cruz  Patient : 1932  5/3/2023  6:37 AM  ROOM 419    Portions of this note have been copied forward, however, changed to reflect the most current clinical status of this patient. Subjective: Alert and following commands. Hypertensive, tachycardic with heart rate up to 191, rhonchi noted throughout lungs, O2 saturation 89%. Nursing at bedside and Dr. Rosa Maria Frost made aware of change in condition and addressing      HISTORY OF PRESENT ILLNESS:   Miguelina De La Cruz was first seen by Eligio Pritchett on  for neuthrophilic leukocytosis during inpatient hospitalization at [facility].    23- CT head w/o remarkable for: No acute intracranial abnormality. 23- chest x-ray: Moderate right pleural effusion with adjacent atelectasis versus pneumonia. 23-CTA with contrast:. No evidence of acute pulmonary embolism. Moderate right pleural effusion with bibasilar atelectasis or infiltrates. Laboratorial workup remarkable for WBC 58.9, ANC 50.7, Hb 10. 3?  and PLTS 401.  CMP revealed Albumin 2.3, normal LFTs and normal creatinine      Medications  Current Facility-Administered Medications   Medication Dose Route Frequency Provider Last Rate Last Admin    vancomycin (VANCOCIN) 1000 mg in sodium chloride 0.9% 250 mL IVPB  1,000 mg IntraVENous Q24H Rocio Lamas  mL/hr at 23 0544 1,000 mg at 23 0544    lidocaine PF 1 % injection 5 mL  5 mL IntraDERmal Once Luis Painting MD        0.9 % sodium chloride infusion  25 mL IntraVENous PRN Luis Painting MD        dextrose 5 % in lactated ringers infusion   IntraVENous Continuous Tomi Richards MD 75 mL/hr at 23 1652 New Bag at 23 1652    haloperidol lactate (HALDOL) injection 5 mg  5 mg IntraMUSCular Q6H PRN Tomi Richards MD        vancomycin Dago Miguel) intermittent dosing (placeholder)   Other RX Luis Cevallos MD        sodium chloride flush 0.9 % injection
Pharmacy Adjustment per [address] [facility] protocol    Saad is a 80 y.o. female. Pharmacy has adjusted medications per [address] [facility] protocol. Recent Labs     05/02/23  0358 05/03/23  0619   BUN 25* 20       Recent Labs     05/02/23  0358 05/03/23  0619   CREATININE 0.8 0.6       Estimated Creatinine Clearance: 58 mL/min (based on SCr of 0.6 mg/dL). Height:   Ht Readings from Last 1 Encounters:   10/23/22 5' 6\" (1.676 m)     Weight:  Wt Readings from Last 1 Encounters:   05/01/23 140 lb (63.5 kg)         Plan: Adjust the following medications based on [address] [facility] protocol:           Meropenem 1 gm IV every 12 hours extended infusion adjusted to Meropenem 1 gm IV every 8 hours extended infusion.     Electronically signed by Ursula Amin, [address] [place] [address] on 5/3/2023 at 2:16 PM
Pharmacy Adjustment per [facility] protocol    Saad is a 80 y.o. female. Pharmacy has adjusted medications per [facility] protocol. Recent Labs     05/01/23  1257   BUN 31*       Recent Labs     05/01/23  1257   CREATININE 1.0*       Estimated Creatinine Clearance: 35 mL/min (A) (based on SCr of 1 mg/dL (H)). Height:   Ht Readings from Last 1 Encounters:   10/23/22 5' 6\" (1.676 m)     Weight:  Wt Readings from Last 1 Encounters:   05/01/23 140 lb (63.5 kg)         Plan: Adjust the following medications based on [facility] protocol:           Meropenem 1 gm IV every 8 hours extended infusion adjusted to  Meropenem 1 gm IV every 12 hours extended infusion.     Electronically signed by Brandyn Reyes [facility] on 5/1/2023 at 11:11 PM
The pts dtr signed the adm forms admitting the pt to the [place] [id number].. It is ok to dc the pt, call [phone] to give report. Then transfer the pt to the [place] [id number].. Emotional and sc support provided.
follow. Thank you for this consult. Treatment Plan  Requires SLP Intervention: Yes     Recommended Diet and Intervention  Diet Solids Recommendation: NPO  Liquid Consistency Recommendation: NPO  Recommended Form of Meds: Meds crushed in puree as able  Therapeutic Interventions: Patient/Family education;Oral Care; Therapeutic PO trials with SLP     Treatment/Goals  Timeframe for Short-term Goals: 1-2x/day for 3 days   Goal 1: Patient NPO  Goal 2: Patient staff will follow swallow safety recommendations. Goal 3: Patient will receive daily oral care, via staff, to decrease bacteria from the oral cavity. Goal 4: Re-assessment of swallow function for potential PO intake. Goal 5: Re-assessment of speech production. Goal 6: Potential cognitive-linguistic eval     General  Chart Reviewed: Yes  Behavior/Cognition: Cooperative; Patient appeared lethargic within short period of time. O2 Device: None (Room air)  Communication Observation: (Assessed patient's speech production. Patient exhibited decreased volume of speech, decreased labial movements, and slow, decreased lingual movements during verbalizations. SLP ranked functional intelligibility of speech for unfamiliar listeners at % in utterances with background noise present.)  Follows Directions: Simple   Patient Positioning: Upright in bed  Consistencies Administered: Ice chips;Dysphagia Pureed (Dysphagia I); Honey - cup      Oral Motor Examination   Labial ROM: (Decreased, bilaterally, during labial retraction trials and labial protrusion trials.)  Labial Strength: (Adequate during labial compression trials.)  Labial Coordination: (Slowed movements were noted.)  Lingual ROM: (Decreased during lingual extension trials with no full point achieved; decreased during lingual elevation trials without use of accessory jaw movement; decreased movements noted bilaterally.)  Lingual Strength: (Decreased)  Lingual Coordination: (Slowed movements were noted.)

## 2023-05-05 LAB
BACTERIA BLD CULT ORG #2: ABNORMAL
BACTERIA BLD CULT ORG #2: ABNORMAL
ORGANISM: ABNORMAL

## 2023-05-08 LAB
BACTERIA BLD CULT ORG #2: NORMAL
BACTERIA BLD CULT: NORMAL

## 2023-05-29 ASSESSMENT — ENCOUNTER SYMPTOMS: SHORTNESS OF BREATH: 1

## 2024-03-14 NOTE — ED NOTES
[facility]  10/24/22 [address] [facility]